# Patient Record
Sex: FEMALE | Race: WHITE | Employment: OTHER | URBAN - METROPOLITAN AREA
[De-identification: names, ages, dates, MRNs, and addresses within clinical notes are randomized per-mention and may not be internally consistent; named-entity substitution may affect disease eponyms.]

---

## 2017-02-17 DIAGNOSIS — E78.00 HYPERCHOLESTEROLEMIA: ICD-10-CM

## 2017-02-17 RX ORDER — MOMETASONE FUROATE 50 UG/1
2 SPRAY, METERED NASAL DAILY
Qty: 1 CONTAINER | Refills: 2 | Status: SHIPPED | OUTPATIENT
Start: 2017-02-17 | End: 2017-03-19

## 2017-02-17 RX ORDER — PRAVASTATIN SODIUM 40 MG/1
TABLET ORAL
Qty: 90 TAB | Refills: 4 | Status: SHIPPED | OUTPATIENT
Start: 2017-02-17 | End: 2017-05-10 | Stop reason: SDUPTHER

## 2017-02-17 NOTE — TELEPHONE ENCOUNTER
Fax received of updated [address] listing daughter, Guillermina Dawson, with all access. Daughter would like Nasonex refill sent to [facility] as ins has changed and hoping this is covered this time. Request 90 day supply of pravastatin be sent to [facility] mail order.

## 2017-05-02 ENCOUNTER — APPOINTMENT (OUTPATIENT)
Dept: INTERNAL MEDICINE CLINIC | Age: 82
End: 2017-05-02

## 2017-05-02 DIAGNOSIS — E55.9 VITAMIN D DEFICIENCY: ICD-10-CM

## 2017-05-02 DIAGNOSIS — E78.00 HYPERCHOLESTEROLEMIA: ICD-10-CM

## 2017-05-03 LAB
25(OH)D3+25(OH)D2 SERPL-MCNC: 21.7 NG/ML (ref 30–100)
ALBUMIN SERPL-MCNC: 4.1 G/DL (ref 3.5–4.7)
ALBUMIN/GLOB SERPL: 1.8 {RATIO} (ref 1.2–2.2)
ALP SERPL-CCNC: 51 IU/L (ref 39–117)
ALT SERPL-CCNC: 14 IU/L (ref 0–32)
AST SERPL-CCNC: 17 IU/L (ref 0–40)
BILIRUB SERPL-MCNC: 0.5 MG/DL (ref 0–1.2)
BUN SERPL-MCNC: 15 MG/DL (ref 8–27)
BUN/CREAT SERPL: 17 (ref 12–28)
CALCIUM SERPL-MCNC: 8.7 MG/DL (ref 8.7–10.3)
CHLORIDE SERPL-SCNC: 104 MMOL/L (ref 96–106)
CHOLEST SERPL-MCNC: 167 MG/DL (ref 100–199)
CO2 SERPL-SCNC: 26 MMOL/L (ref 18–29)
CREAT SERPL-MCNC: 0.9 MG/DL (ref 0.57–1)
GLOBULIN SER CALC-MCNC: 2.3 G/DL (ref 1.5–4.5)
GLUCOSE SERPL-MCNC: 89 MG/DL (ref 65–99)
HDLC SERPL-MCNC: 85 MG/DL
INTERPRETATION, 910389: NORMAL
INTERPRETATION: NORMAL
LDLC SERPL CALC-MCNC: 72 MG/DL (ref 0–99)
PDF IMAGE, 910387: NORMAL
POTASSIUM SERPL-SCNC: 4.6 MMOL/L (ref 3.5–5.2)
PROT SERPL-MCNC: 6.4 G/DL (ref 6–8.5)
SODIUM SERPL-SCNC: 142 MMOL/L (ref 134–144)
TRIGL SERPL-MCNC: 50 MG/DL (ref 0–149)
VLDLC SERPL CALC-MCNC: 10 MG/DL (ref 5–40)

## 2017-05-10 ENCOUNTER — OFFICE VISIT (OUTPATIENT)
Dept: INTERNAL MEDICINE CLINIC | Age: 82
End: 2017-05-10

## 2017-05-10 VITALS
TEMPERATURE: 97.6 F | WEIGHT: 114 LBS | RESPIRATION RATE: 16 BRPM | SYSTOLIC BLOOD PRESSURE: 111 MMHG | DIASTOLIC BLOOD PRESSURE: 64 MMHG | HEIGHT: 62 IN | OXYGEN SATURATION: 98 % | BODY MASS INDEX: 20.98 KG/M2 | HEART RATE: 67 BPM

## 2017-05-10 DIAGNOSIS — R25.2 CRAMP OF BOTH LOWER EXTREMITIES: ICD-10-CM

## 2017-05-10 DIAGNOSIS — M80.00XS OSTEOPOROSIS WITH PATHOLOGICAL FRACTURE, SEQUELA: ICD-10-CM

## 2017-05-10 DIAGNOSIS — H61.23 BILATERAL IMPACTED CERUMEN: ICD-10-CM

## 2017-05-10 DIAGNOSIS — E78.00 HYPERCHOLESTEROLEMIA: ICD-10-CM

## 2017-05-10 DIAGNOSIS — M80.08XD PATHOLOGICAL FRACTURE OF VERTEBRA DUE TO OSTEOPOROSIS WITH ROUTINE HEALING, UNSPECIFIED OSTEOPOROSIS TYPE, SUBSEQUENT ENCOUNTER: Primary | ICD-10-CM

## 2017-05-10 DIAGNOSIS — E55.9 VITAMIN D DEFICIENCY: ICD-10-CM

## 2017-05-10 RX ORDER — GLUCOSAMINE SULFATE 1500 MG
1000 POWDER IN PACKET (EA) ORAL DAILY
COMMUNITY
End: 2017-11-07 | Stop reason: DRUGHIGH

## 2017-05-10 RX ORDER — PRAVASTATIN SODIUM 20 MG/1
TABLET ORAL
Qty: 90 TAB | Refills: 4 | Status: SHIPPED | OUTPATIENT
Start: 2017-05-10 | End: 2018-07-12 | Stop reason: SDUPTHER

## 2017-05-10 RX ORDER — ERGOCALCIFEROL 1.25 MG/1
50000 CAPSULE ORAL
Qty: 5 CAP | Refills: 1 | Status: SHIPPED | OUTPATIENT
Start: 2017-05-10 | End: 2017-06-29

## 2017-05-10 NOTE — PROGRESS NOTES
HPI  Ms. Lillian Mon is a 80y.o. year old female, she is seen today for follow up high cholesterol, osteoporosis. In 11/2016, regarding osteoporosis - patient had fosamax, hadn't been taking but says she wants to take it again and her daughter has been encouraging her to take it. Still not taking fosamax, prefers reclast.  Says her hearing is getting worse, no pain. Has had hearing aid in past, took it back b/c it didn't work. Will get eval again at another place. Has been well since last visit. Stays busy. No cp, sob, dizziness or weakness. Continues to have edema off and on, okay today. No unusual aches or pains. Still cuts grass with push mower, also vegetable garden. Continues to have leg cramps at night. Chief Complaint   Patient presents with    Cholesterol Problem     Room 1// Eye exam done in Oct with Dr Marlene Pereira Osteoporosis     Review Lab results        Prior to Admission medications    Medication Sig Start Date End Date Taking? Authorizing Provider   ACETAMINOPHEN (TYLENOL PO) Take  by mouth as needed. Yes Historical Provider   cholecalciferol (VITAMIN D3) 1,000 unit cap Take 1,000 Units by mouth daily. Yes Historical Provider   ergocalciferol (ERGOCALCIFEROL) 50,000 unit capsule Take 1 Cap by mouth every seven (7) days for 8 doses. 5/10/17 6/29/17 Yes Faiht Torre MD   pravastatin (PRAVACHOL) 20 mg tablet TAKE ONE TABLET BY MOUTH EVERY DAY 5/10/17  Yes Faith Torre MD   VIT C/VIT E/LUTEIN/MIN/OMEGA-3 (OCUVITE PO) Take  by mouth daily.    Yes Historical Provider         Allergies   Allergen Reactions    Lipitor [Atorvastatin] Myalgia    Pravastatin Myalgia    Zetia [Ezetimibe] Other (comments)     itching         REVIEW OF SYSTEMS:  Per HPI    PHYSICAL EXAM:  Visit Vitals    /64 (BP 1 Location: Right arm, BP Patient Position: Sitting)    Pulse 67    Temp 97.6 °F (36.4 °C) (Oral)    Resp 16    Ht 5' 2\" (1.575 m)    Wt 114 lb (51.7 kg)    SpO2 98%    BMI 20.85 kg/m2     Constitutional: Appears well-developed and well-nourished. No distress. HENT:   Head: Normocephalic and atraumatic. Eyes: No scleral icterus. Ears: tm's initially not visualized due to cerumen impaction, after removal tm's wnl  Neck: no lad, no tm, supple   Cardiovascular: Normal S1/S2, regular rhythm. No murmurs, rubs, or gallops. Pulmonary/Chest: Effort normal and breath sounds normal. No respiratory distress. No wheezes, rhonchi, or rales. Ext: No edema rle, trace to 1+ left ankle edema  Neurological: Alert. Psychiatric: Normal mood and affect. Behavior is normal.     Lab Results   Component Value Date/Time    Sodium 142 05/02/2017 09:42 AM    Potassium 4.6 05/02/2017 09:42 AM    Chloride 104 05/02/2017 09:42 AM    CO2 26 05/02/2017 09:42 AM    Anion gap 5 10/12/2010 08:57 AM    Glucose 89 05/02/2017 09:42 AM    BUN 15 05/02/2017 09:42 AM    Creatinine 0.90 05/02/2017 09:42 AM    BUN/Creatinine ratio 17 05/02/2017 09:42 AM    GFR est AA 67 05/02/2017 09:42 AM    GFR est non-AA 58 05/02/2017 09:42 AM    Calcium 8.7 05/02/2017 09:42 AM    Bilirubin, total 0.5 05/02/2017 09:42 AM    AST (SGOT) 17 05/02/2017 09:42 AM    Alk. phosphatase 51 05/02/2017 09:42 AM    Protein, total 6.4 05/02/2017 09:42 AM    Albumin 4.1 05/02/2017 09:42 AM    Globulin 2.9 10/12/2010 08:57 AM    A-G Ratio 1.8 05/02/2017 09:42 AM    ALT (SGPT) 14 05/02/2017 09:42 AM     No results found for: HBA1C, HGBE8, VXZ7FLRL, IGO0OGIL   Lab Results   Component Value Date/Time    Cholesterol, total 167 05/02/2017 09:42 AM    HDL Cholesterol 85 05/02/2017 09:42 AM    LDL, calculated 72 05/02/2017 09:42 AM    VLDL, calculated 10 05/02/2017 09:42 AM    Triglyceride 50 05/02/2017 09:42 AM    CHOL/HDL Ratio 2.6 10/12/2010 08:57 AM          ASSESSMENT/PLAN  Devora was seen today for cholesterol problem and osteoporosis.     Diagnoses and all orders for this visit:    Osteoporosis with pathological fracture, sequela  -     METABOLIC PANEL, COMPREHENSIVE; Future  Desires reclast - will order  Vitamin D deficiency  -     ergocalciferol (ERGOCALCIFEROL) 50,000 unit capsule; Take 1 Cap by mouth every seven (7) days for 8 doses. -     VITAMIN D, 25 HYDROXY; Future    Hypercholesterolemia  -     pravastatin (PRAVACHOL) 20 mg tablet; TAKE ONE TABLET BY MOUTH EVERY DAY  -     LIPID PANEL; Future  At goal but leg cramps, decrease dose  Bilateral impacted cerumen  -     MN REMOVE IMPACTED EAR WAX  Tolerated irrigation and removal of cerumen with currette  Cramp of both lower extremities  Decrease pravastatin as above, may be contributing to leg cramps, stay hydrated        There are no preventive care reminders to display for this patient. Follow-up Disposition:  Return in about 6 months (around 11/10/2017) for chol, AWV, labs prior. Reviewed plan of care. Patient has provided input and agrees with goals. The nurse provided the patient and/or family with advanced directive information if needed and encouraged the patient to provide a copy to the office when available.

## 2017-05-10 NOTE — MR AVS SNAPSHOT
Visit Information Date & Time Provider Department Dept. Phone Encounter #  
 5/10/2017  9:00 AM Ian WatsonWest [phone] [id number] Follow-up Instructions Return in about 6 months (around 11/10/2017) for chol, AWV, labs prior. Routing History Upcoming Health Maintenance Date Due INFLUENZA AGE 9 TO ADULT 8/1/2017 MEDICARE YEARLY EXAM 11/11/2017 GLAUCOMA SCREENING Q2Y 10/1/2018 DTaP/Tdap/Td series (2 - Td) 10/19/2021 Allergies as of 5/10/2017  Review Complete On: 5/10/2017 By: Ian Watson MD  
  
 Severity Noted Reaction Type Reactions Lipitor [Atorvastatin]  02/24/2016    Myalgia Pravastatin  10/14/2014    Myalgia Zetia [Ezetimibe]  06/10/2016    Other (comments)  
 itching Current Immunizations  Reviewed on 11/10/2016 Name Date Influenza High Dose Vaccine PF 11/10/2016, 10/14/2014 Influenza Vaccine 10/30/2013 Influenza Vaccine Split 10/19/2011, 10/19/2010 Pneumococcal Conjugate (PCV-13) 7/6/2015 Pneumococcal Vaccine (Unspecified Type) 6/7/2000 TD Vaccine 6/15/2005 TDAP Vaccine 10/19/2011 Not reviewed this visit You Were Diagnosed With   
  
 Codes Comments Osteoporosis with pathological fracture, sequela    -  Primary ICD-10-CM: M80.00XS ICD-9-CM: 905.5 Vitamin D deficiency     ICD-10-CM: E55.9 ICD-9-CM: 268.9 Hypercholesterolemia     ICD-10-CM: E78.00 ICD-9-CM: 272.0 Bilateral impacted cerumen     ICD-10-CM: H61.23 
ICD-9-CM: 380.4 Vitals BP Pulse Temp Resp Height(growth percentile) Weight(growth percentile) 111/64 (BP 1 Location: Right arm, BP Patient Position: Sitting) 67 97.6 °F (36.4 °C) (Oral) 16 5' 2\" (1.575 m) 114 lb (51.7 kg) SpO2 BMI OB Status Smoking Status 98% 20.85 kg/m2 Postmenopausal Never Smoker Vitals History BMI and BSA Data  Body Mass Index Body Surface Area  
 20.85 kg/m 2 1.5 m 2  
  
  
 Preferred Pharmacy Pharmacy Name Phone [facility], [place] - [address] [phone] Your Updated Medication List  
  
   
This list is accurate as of: 5/10/17  9:38 AM.  Always use your most recent med list.  
  
  
  
  
 ergocalciferol 50,000 unit capsule Commonly known as:  ERGOCALCIFEROL Take 1 Cap by mouth every seven (7) days for 8 doses. OCUVITE PO Take  by mouth daily. pravastatin 20 mg tablet Commonly known as:  PRAVACHOL  
TAKE ONE TABLET BY MOUTH EVERY DAY  
  
 TYLENOL PO Take  by mouth as needed. VITAMIN D3 1,000 unit Cap Generic drug:  cholecalciferol Take 1,000 Units by mouth daily. Prescriptions Sent to Pharmacy Refills  
 ergocalciferol (ERGOCALCIFEROL) 50,000 unit capsule 1 Sig: Take 1 Cap by mouth every seven (7) days for 8 doses. Class: Normal  
 Pharmacy: [address] [place] [address], [address] [place] [address] Ph #: [phone] Route: Oral  
 pravastatin (PRAVACHOL) 20 mg tablet 4 Sig: TAKE ONE TABLET BY MOUTH EVERY DAY Class: Normal  
 Pharmacy: [address] [place] [address] Ph #: [phone] Follow-up Instructions Return in about 6 months (around 11/10/2017) for chol, AWV, labs prior. To-Do List   
 11/06/2017 Lab:  LIPID PANEL   
  
 11/06/2017 Lab:  METABOLIC PANEL, COMPREHENSIVE   
  
 11/06/2017 Lab:  VITAMIN D, 25 HYDROXY Patient Instructions New dose of pravastatin is 20mg daily - you can cut your 40mg tablet in 1/2 until gone and the new prescription will come from mail order. Earwax Blockage: Care Instructions Your Care Instructions Earwax is a natural substance that protects the ear canal. Normally, earwax drains from the ears and does not cause problems. Sometimes earwax builds up and hardens.  Earwax blockage (also called cerumen impaction) can cause some loss of hearing and pain. When wax is tightly packed, you will need to have your doctor remove it. Follow-up care is a key part of your treatment and safety. Be sure to make and go to all appointments, and call your doctor if you are having problems. Its also a good idea to know your test results and keep a list of the medicines you take. How can you care for yourself at home? · Do not try to remove earwax with cotton swabs, fingers, or other objects. This can make the blockage worse and damage the eardrum. · If your doctor recommends that you try to remove earwax at home: ¨ Soften and loosen the earwax with warm mineral oil. You also can try hydrogen peroxide mixed with an equal amount of room temperature water. Place 2 drops of the fluid, warmed to body temperature, in the ear two times a day for up to 5 days. ¨ Once the wax is loose and soft, all that is usually needed to remove it from the ear canal is a gentle, warm shower. Direct the water into the ear, then tip your head to let the earwax drain out. Dry your ear thoroughly with a hair dryer set on low. Hold the dryer several inches from your ear. ¨ If the warm mineral oil and shower do not work, use an over-the-counter wax softener followed by gentle flushing with an ear syringe each night for a week or two. Make sure the flushing solution is body temperature. Cool or hot fluids in the ear can cause dizziness. When should you call for help? Call your doctor now or seek immediate medical care if: · Pus or blood drains from your ear. · Your ears are ringing or feel full. · You have a loss of hearing. Watch closely for changes in your health, and be sure to contact your doctor if: 
· You have pain or reduced hearing after 1 week of home treatment. · You have any new symptoms, such as nausea or balance problems. Where can you learn more? Go to http://francisco javier-nadiya.info/. Enter U528 in the search box to learn more about \"Earwax Blockage: Care Instructions. \" Current as of: May 27, 2016 Content Version: 11.2 © 5776-9412 [employer]. Care instructions adapted under license by [employer] (which disclaims liability or warranty for this information). If you have questions about a medical condition or this instruction, always ask your healthcare professional. [address] any warranty or liability for your use of this information. Introducing [facility]! Dear Channing Newsome: 
Thank you for requesting a [employer] account. Our records indicate that you have previously registered for a [employer] account but its currently inactive. Please call our [employer] support line at [phone]. Additional Information If you have questions, please visit the Frequently Asked Questions section of the [employer] website at [email or web address] Remember, [employer] is NOT to be used for urgent needs. For medical emergencies, dial 911. Now available from your iPhone and Android! Please provide this summary of care documentation to your next provider. Your primary care clinician is listed as Casandra Aguero. If you have any questions after today's visit, please call [phone].

## 2017-05-10 NOTE — PROGRESS NOTES
Reviewed record In preparation for visit and have obtained necessary documentation    1. Have you been to the ER, urgent care clinic since your last visit? Hospitalized since your last visit? NO  2. Have you seen or consulted any other health care providers outside of the [address] [place] [address] since your last visit? Include any pap smears or colon screening.  NO    Patient has advance directive on file

## 2017-05-31 PROBLEM — M80.08XD PATHOLOGICAL FRACTURE OF VERTEBRA DUE TO OSTEOPOROSIS WITH ROUTINE HEALING: Status: ACTIVE | Noted: 2017-05-31

## 2017-11-02 ENCOUNTER — APPOINTMENT (OUTPATIENT)
Dept: INTERNAL MEDICINE CLINIC | Age: 82
End: 2017-11-02

## 2017-11-02 DIAGNOSIS — E55.9 VITAMIN D DEFICIENCY: ICD-10-CM

## 2017-11-02 DIAGNOSIS — M80.00XS OSTEOPOROSIS WITH PATHOLOGICAL FRACTURE, SEQUELA: ICD-10-CM

## 2017-11-02 DIAGNOSIS — E78.00 HYPERCHOLESTEROLEMIA: ICD-10-CM

## 2017-11-03 LAB
25(OH)D3+25(OH)D2 SERPL-MCNC: 21 NG/ML (ref 30–100)
ALBUMIN SERPL-MCNC: 4.4 G/DL (ref 3.5–4.7)
ALBUMIN/GLOB SERPL: 2 {RATIO} (ref 1.2–2.2)
ALP SERPL-CCNC: 46 IU/L (ref 39–117)
ALT SERPL-CCNC: 14 IU/L (ref 0–32)
AST SERPL-CCNC: 21 IU/L (ref 0–40)
BILIRUB SERPL-MCNC: 0.5 MG/DL (ref 0–1.2)
BUN SERPL-MCNC: 13 MG/DL (ref 8–27)
BUN/CREAT SERPL: 17 (ref 12–28)
CALCIUM SERPL-MCNC: 8.8 MG/DL (ref 8.7–10.3)
CHLORIDE SERPL-SCNC: 100 MMOL/L (ref 96–106)
CHOLEST SERPL-MCNC: 198 MG/DL (ref 100–199)
CO2 SERPL-SCNC: 25 MMOL/L (ref 18–29)
CREAT SERPL-MCNC: 0.77 MG/DL (ref 0.57–1)
GFR SERPLBLD CREATININE-BSD FMLA CKD-EPI: 71 ML/MIN/1.73
GFR SERPLBLD CREATININE-BSD FMLA CKD-EPI: 81 ML/MIN/1.73
GLOBULIN SER CALC-MCNC: 2.2 G/DL (ref 1.5–4.5)
GLUCOSE SERPL-MCNC: 93 MG/DL (ref 65–99)
HDLC SERPL-MCNC: 85 MG/DL
INTERPRETATION, 910389: NORMAL
LDLC SERPL CALC-MCNC: 101 MG/DL (ref 0–99)
POTASSIUM SERPL-SCNC: 4.5 MMOL/L (ref 3.5–5.2)
PROT SERPL-MCNC: 6.6 G/DL (ref 6–8.5)
SODIUM SERPL-SCNC: 140 MMOL/L (ref 134–144)
TRIGL SERPL-MCNC: 60 MG/DL (ref 0–149)
VLDLC SERPL CALC-MCNC: 12 MG/DL (ref 5–40)

## 2017-11-07 ENCOUNTER — OFFICE VISIT (OUTPATIENT)
Dept: INTERNAL MEDICINE CLINIC | Age: 82
End: 2017-11-07

## 2017-11-07 VITALS
RESPIRATION RATE: 14 BRPM | HEIGHT: 62 IN | OXYGEN SATURATION: 97 % | SYSTOLIC BLOOD PRESSURE: 101 MMHG | DIASTOLIC BLOOD PRESSURE: 60 MMHG | HEART RATE: 65 BPM | TEMPERATURE: 97.4 F | WEIGHT: 108.2 LBS | BODY MASS INDEX: 19.91 KG/M2

## 2017-11-07 DIAGNOSIS — M40.04 POSTURAL KYPHOSIS OF THORACIC REGION: ICD-10-CM

## 2017-11-07 DIAGNOSIS — Z71.89 ADVANCED DIRECTIVES, COUNSELING/DISCUSSION: ICD-10-CM

## 2017-11-07 DIAGNOSIS — E78.00 HYPERCHOLESTEROLEMIA: ICD-10-CM

## 2017-11-07 DIAGNOSIS — E55.9 VITAMIN D DEFICIENCY: ICD-10-CM

## 2017-11-07 DIAGNOSIS — Z13.39 SCREENING FOR ALCOHOLISM: ICD-10-CM

## 2017-11-07 DIAGNOSIS — Z00.00 MEDICARE ANNUAL WELLNESS VISIT, SUBSEQUENT: Primary | ICD-10-CM

## 2017-11-07 DIAGNOSIS — G89.29 CHRONIC MIDLINE THORACIC BACK PAIN: ICD-10-CM

## 2017-11-07 DIAGNOSIS — M54.6 CHRONIC MIDLINE THORACIC BACK PAIN: ICD-10-CM

## 2017-11-07 DIAGNOSIS — Z13.31 SCREENING FOR DEPRESSION: ICD-10-CM

## 2017-11-07 RX ORDER — ACETAMINOPHEN 500 MG
2000 TABLET ORAL DAILY
Qty: 90 CAP | Refills: 4 | Status: SHIPPED | OUTPATIENT
Start: 2017-11-07 | End: 2021-07-26 | Stop reason: SDUPTHER

## 2017-11-07 NOTE — ACP (ADVANCE CARE PLANNING)
Advance Care Planning    has at home - will bring copy     Advance Care Planning (ACP) Provider Conversation Snapshot    Date of ACP Conversation: 11/07/17  Persons included in Conversation:  patient  Length of ACP Conversation in minutes:  <16 minutes (Non-Billable)    Authorized Decision Maker (if patient is incapable of making informed decisions): This person is:   daughter Jeri Graver          For Patients with Decision Making Capacity:   Values/Goals: Exploration of values, goals, and preferences if recovery is not expected, even with continued medical treatment in the event of:  Imminent death  Severe, permanent brain injury  \"In these circumstances, what matters most to you? \"  Care focused more on comfort or quality of life.     Conversation Outcomes / Follow-Up Plan:   see above

## 2017-11-07 NOTE — PROGRESS NOTES
HPI  Ms. Jaiden Sims is a 80y.o. year old female, she is seen today for follow up high cholesterol, AWV. Has been fairly well since last visit. Has been noticing balance is worse especially when she gets up in morning -feels off balance, uses cane until she feels safe. No falls. Frequently has pain in mid back, not sure what makes it worse. Doesn't have pain daily. Takes tylenol occasionally for pain. It does help. Notices she is leaning over more to walk, more hunched, wonders if brace would help. No chest pain or sob. Is on lower dose pravastatin with less if any leg cramps, no edema and cholesterol is at goal.     Chief Complaint   Patient presents with    Cholesterol Problem     Room 2// NON fasting     Annual Wellness Visit    Back Pain     Pt reports more back pain and curvature of her back/leaning forward . discuss brace. Prior to Admission medications    Medication Sig Start Date End Date Taking? Authorizing Provider   Cholecalciferol, Vitamin D3, (VITAMIN D3) 2,000 unit cap capsule Take 2,000 Units by mouth daily. 11/7/17  Yes Anabella Mac MD   ACETAMINOPHEN (TYLENOL PO) Take  by mouth as needed. Yes Historical Provider   pravastatin (PRAVACHOL) 20 mg tablet TAKE ONE TABLET BY MOUTH EVERY DAY 5/10/17  Yes Anabella Mac MD   VIT C/VIT E/LUTEIN/MIN/OMEGA-3 (OCUVITE PO) Take  by mouth daily. Yes Historical Provider         Allergies   Allergen Reactions    Lipitor [Atorvastatin] Myalgia    Pravastatin Myalgia    Zetia [Ezetimibe] Other (comments)     itching         REVIEW OF SYSTEMS:  Per HPI    PHYSICAL EXAM:  Visit Vitals    /60 (BP 1 Location: Right arm, BP Patient Position: Sitting)    Pulse 65    Temp 97.4 °F (36.3 °C) (Oral)    Resp 14    Ht 5' 2\" (1.575 m)    Wt 108 lb 3.2 oz (49.1 kg)    SpO2 97%    BMI 19.79 kg/m2     Constitutional: Appears well-developed and well-nourished. No distress. HENT:   Head: Normocephalic and atraumatic.    Eyes: No scleral icterus. Cardiovascular: Normal S1/S2, regular rhythm. No murmurs, rubs, or gallops. Pulmonary/Chest: Effort normal and breath sounds normal. No respiratory distress. No wheezes, rhonchi, or rales. Abdomen: Soft, NT/ND, +BS, no rebound or guarding, no masses, no HSM appreciated. Back: +kyphosis and mild tenderness to palpation over thoracic spine  Ext: No edema. Neurological: Alert. Strength 5/5 b/l LE  Psychiatric: Normal mood and affect. Behavior is normal.     Lab Results   Component Value Date/Time    Sodium 140 11/02/2017 10:04 AM    Potassium 4.5 11/02/2017 10:04 AM    Chloride 100 11/02/2017 10:04 AM    CO2 25 11/02/2017 10:04 AM    Anion gap 5 10/12/2010 08:57 AM    Glucose 93 11/02/2017 10:04 AM    BUN 13 11/02/2017 10:04 AM    Creatinine 0.77 11/02/2017 10:04 AM    BUN/Creatinine ratio 17 11/02/2017 10:04 AM    GFR est AA 81 11/02/2017 10:04 AM    GFR est non-AA 71 11/02/2017 10:04 AM    Calcium 8.8 11/02/2017 10:04 AM    Bilirubin, total 0.5 11/02/2017 10:04 AM    AST (SGOT) 21 11/02/2017 10:04 AM    Alk. phosphatase 46 11/02/2017 10:04 AM    Protein, total 6.6 11/02/2017 10:04 AM    Albumin 4.4 11/02/2017 10:04 AM    Globulin 2.9 10/12/2010 08:57 AM    A-G Ratio 2.0 11/02/2017 10:04 AM    ALT (SGPT) 14 11/02/2017 10:04 AM     No results found for: HBA1C, HGBE8, PHI3BTXA, SBR2OLJA   Lab Results   Component Value Date/Time    Cholesterol, total 198 11/02/2017 10:04 AM    HDL Cholesterol 85 11/02/2017 10:04 AM    LDL, calculated 101 11/02/2017 10:04 AM    VLDL, calculated 12 11/02/2017 10:04 AM    Triglyceride 60 11/02/2017 10:04 AM    CHOL/HDL Ratio 2.6 10/12/2010 08:57 AM          ASSESSMENT/PLAN  Diagnoses and all orders for this visit:    1. Medicare annual wellness visit, subsequent    2. Vitamin D deficiency  -     Cholecalciferol, Vitamin D3, (VITAMIN D3) 2,000 unit cap capsule; Take 2,000 Units by mouth daily. -     VITAMIN D, 25 HYDROXY; Future  If rx to expensive can get otc  3. Hypercholesterolemia  -     METABOLIC PANEL, COMPREHENSIVE; Future  -     LIPID PANEL; Future  At goal, continue current medications   4. Advanced directives, counseling/discussion    5. Screening for alcoholism  -     Annual  Alcohol Screen 15 min ()    6. Screening for depression  -     Depression Screen Annual    7. Postural kyphosis of thoracic region  -     AMB SUPPLY ORDER  Refer to select PT for brace fitting  8. Chronic midline thoracic back pain  -     AMB SUPPLY ORDER          There are no preventive care reminders to display for this patient. Follow-up Disposition:  Return in about 6 months (around 5/7/2018) for chol, vit d, labs prior. Reviewed plan of care. Patient has provided input and agrees with goals. The nurse provided the patient and/or family with advanced directive information if needed and encouraged the patient to provide a copy to the office when available. This is a Subsequent Medicare Annual Wellness Exam (AWV) (Performed 12 months after IPPE or effective date of Medicare Part B enrollment)    I have reviewed the patient's medical history in detail and updated the computerized patient record. History     Past Medical History:   Diagnosis Date    Arthritis     GERD (gastroesophageal reflux disease)     Hx-TIA (transient ischemic attack) 2006    Hypercholesterolemia     Osteoporosis     Other ill-defined conditions(799.89)     high cholesterol    S/P colonoscopy 2001      Past Surgical History:   Procedure Laterality Date    HX ORTHOPAEDIC  2010    left hip replacement    NC COLONOSCOPY FLX DX W/COLLJ SPEC WHEN PFRMD  5/25/2012         STRESS TEST CARDIAC  2007     Current Outpatient Prescriptions   Medication Sig Dispense Refill    Cholecalciferol, Vitamin D3, (VITAMIN D3) 2,000 unit cap capsule Take 2,000 Units by mouth daily. 90 Cap 4    ACETAMINOPHEN (TYLENOL PO) Take  by mouth as needed.       pravastatin (PRAVACHOL) 20 mg tablet TAKE ONE TABLET BY MOUTH EVERY DAY 90 Tab 4    VIT C/VIT E/LUTEIN/MIN/OMEGA-3 (OCUVITE PO) Take  by mouth daily. Allergies   Allergen Reactions    Lipitor [Atorvastatin] Myalgia    Pravastatin Myalgia    Zetia [Ezetimibe] Other (comments)     itching     Family History   Problem Relation Age of Onset    Diabetes Mother     Hypertension Mother     Stroke Mother      Social History   Substance Use Topics    Smoking status: Never Smoker    Smokeless tobacco: Never Used    Alcohol use No     Patient Active Problem List   Diagnosis Code    Hypercholesterolemia E78.00    Osteoporosis with pathological fracture M80.00XA    Hx-TIA (transient ischemic attack) Z86.73    History of colonoscopy Z98.890    Chest pain R07.9    Presbyacusia H91.10    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 08XD       Depression Risk Factor Screening:     PHQ over the last two weeks 11/7/2017   Little interest or pleasure in doing things Not at all   Feeling down, depressed or hopeless Not at all   Total Score PHQ 2 0     Alcohol Risk Factor Screening: You do not drink alcohol or very rarely. Functional Ability and Level of Safety:   Hearing Loss  Hearing is decreased. Activities of Daily Living  The home contains: no safety equipment. Patient does total self care    Fall Risk  Fall Risk Assessment, last 12 mths 11/7/2017   Able to walk? Yes   Fall in past 12 months?  No       Abuse Screen  Patient is not abused    Cognitive Screening   Evaluation of Cognitive Function:  Has your family/caregiver stated any concerns about your memory: no  Normal    Patient Care Team   Patient Care Team:  Familia Hameed MD as PCP - General (Internal Medicine)    Assessment/Plan   Education and counseling provided:  Are appropriate based on today's review and evaluation    Diagnoses and all orders for this visit:    1. Medicare annual wellness visit, subsequent    2. Vitamin D deficiency  -     Cholecalciferol, Vitamin D3, (VITAMIN D3) 2,000 unit cap capsule; Take 2,000 Units by mouth daily. -     VITAMIN D, 25 HYDROXY; Future    3. Hypercholesterolemia  -     METABOLIC PANEL, COMPREHENSIVE; Future  -     LIPID PANEL; Future    4. Advanced directives, counseling/discussion    5. Screening for alcoholism  -     Annual  Alcohol Screen 15 min ()    6. Screening for depression  -     Depression Screen Annual    7. Postural kyphosis of thoracic region  -     AMB SUPPLY ORDER    8. Chronic midline thoracic back pain  -     AMB SUPPLY ORDER        There are no preventive care reminders to display for this patient.

## 2017-11-07 NOTE — PROGRESS NOTES
Tracey Ordoñez    Chief Complaint   Patient presents with   [facility] Cholesterol Problem     Room 2//    [facility] Visit           Reviewed record In preparation for visit and have obtained necessary documentation    1. Have you been to the ER, urgent care clinic since your last visit? Hospitalized since your last visit? NO  2. Have you seen or consulted any other health care providers outside of the [address] [place] [address] since your last visit? Include any pap smears or colon screening. NO    Patient has advance directive on file     The [email or web address] advised of reason for visit and vitals    Health Maintenance Due   Topic    Influenza Age 5 to Adult        Wt Readings from Last 3 Encounters:   05/10/17 114 lb (51.7 kg)   12/20/16 114 lb (51.7 kg)   11/10/16 111 lb 3.2 oz (50.4 kg)     Temp Readings from Last 3 Encounters:   05/10/17 97.6 °F (36.4 °C) (Oral)   12/20/16 97.9 °F (36.6 °C) (Oral)   11/10/16 98 °F (36.7 °C) (Oral)     BP Readings from Last 3 Encounters:   05/10/17 111/64   12/20/16 134/60   11/10/16 121/73     Pulse Readings from Last 3 Encounters:   05/10/17 67   12/20/16 66   11/10/16 69         Learning Assessment:  :     Learning Assessment 6/6/2014   PRIMARY LEARNER Patient   HIGHEST LEVEL OF EDUCATION - PRIMARY LEARNER  DID NOT GRADUATE HIGH SCHOOL   BARRIERS PRIMARY LEARNER NONE   CO-LEARNER CAREGIVER No   PRIMARY LANGUAGE ENGLISH   LEARNER PREFERENCE PRIMARY DEMONSTRATION   ANSWERED BY patient   RELATIONSHIP SELF       Depression Screening:  :     PHQ over the last two weeks 11/10/2016   Little interest or pleasure in doing things Not at all   Feeling down, depressed or hopeless Not at all   Total Score PHQ 2 0       Fall Risk Assessment:  :     Fall Risk Assessment, last 12 mths 11/10/2016   Able to walk? Yes   Fall in past 12 months? No       Abuse Screening:  :     Abuse Screening Questionnaire 11/10/2016 6/6/2014   Do you ever feel afraid of your partner?  N N   Are you in a relationship with someone who physically or mentally threatens you? N N   Is it safe for you to go home? Katrin Melara          I have received verbal consent from Alcus Cousin to discuss any/all medical information while they are present in the room. Medication reconciliation up to date and corrected with patient at this time. Dayanna Soler

## 2017-11-07 NOTE — MR AVS SNAPSHOT
Visit Information Date & Time Provider Department Dept. Phone Encounter #  
 11/7/2017  3:00 PM Mic Spear MD [facility] [phone] [id number] Follow-up Instructions Return in about 6 months (around 5/7/2018) for chol, vit d, labs prior. Your Appointments 11/7/2017  3:00 PM  
ROUTINE CARE with Mic Spear MD  
[facility] Arsenio Quiros) Appt Note: 6mth f/u; chol, AWV, labs prior. ; R/S; 6mth f/u; chol, AWV, - r/s pcp out 11/10  
 [address] [facility] [address] [phone]  
  
   
 [address] Upcoming Health Maintenance Date Due  
 MEDICARE YEARLY EXAM 11/11/2017 GLAUCOMA SCREENING Q2Y 10/1/2018 DTaP/Tdap/Td series (2 - Td) 10/19/2021 Allergies as of 11/7/2017  Review Complete On: 11/7/2017 By: Mic Spear MD  
  
 Severity Noted Reaction Type Reactions Lipitor [Atorvastatin]  02/24/2016    Myalgia Pravastatin  10/14/2014    Myalgia Zetia [Ezetimibe]  06/10/2016    Other (comments)  
 itching Current Immunizations  Reviewed on 11/10/2016 Name Date Influenza High Dose Vaccine PF 11/10/2016, 10/14/2014 Influenza Vaccine 10/30/2013 Influenza Vaccine Split 10/19/2011, 10/19/2010 Pneumococcal Conjugate (PCV-13) 7/6/2015 TD Vaccine 6/15/2005 TDAP Vaccine 10/19/2011 ZZZ-RETIRED (DO NOT USE) Pneumococcal Vaccine (Unspecified Type) 6/7/2000 Not reviewed this visit You Were Diagnosed With   
  
 Codes Comments Medicare annual wellness visit, subsequent    -  Primary ICD-10-CM: Z00.00 ICD-9-CM: V70.0 Vitamin D deficiency     ICD-10-CM: E55.9 ICD-9-CM: 268.9 Hypercholesterolemia     ICD-10-CM: E78.00 ICD-9-CM: 272.0 Advanced directives, counseling/discussion     ICD-10-CM: Z71.89 ICD-9-CM: V65.49 Screening for alcoholism     ICD-10-CM: Z13.89 ICD-9-CM: V79.1 Screening for depression     ICD-10-CM: Z13.89 ICD-9-CM: V79.0 Postural kyphosis of thoracic region     ICD-10-CM: M40.04 
ICD-9-CM: 737.10 Chronic midline thoracic back pain     ICD-10-CM: M54.6, G89.29 ICD-9-CM: 724.1, 338.29 Vitals BP Pulse Temp Resp Height(growth percentile) Weight(growth percentile) 101/60 (BP 1 Location: Right arm, BP Patient Position: Sitting) 65 97.4 °F (36.3 °C) (Oral) 14 5' 2\" (1.575 m) 108 lb 3.2 oz (49.1 kg) SpO2 BMI OB Status Smoking Status 97% 19.79 kg/m2 Postmenopausal Never Smoker Vitals History BMI and BSA Data Body Mass Index Body Surface Area 19.79 kg/m 2 1.47 m 2 Preferred Pharmacy Pharmacy Name Phone [address] [phone] Your Updated Medication List  
  
   
This list is accurate as of: 11/7/17  2:33 PM.  Always use your most recent med list.  
  
  
  
  
 Cholecalciferol (Vitamin D3) 2,000 unit Cap capsule Commonly known as:  VITAMIN D3 Take 2,000 Units by mouth daily. OCUVITE PO Take  by mouth daily. pravastatin 20 mg tablet Commonly known as:  PRAVACHOL  
TAKE ONE TABLET BY MOUTH EVERY DAY  
  
 TYLENOL PO Take  by mouth as needed. Prescriptions Sent to Pharmacy Refills Cholecalciferol, Vitamin D3, (VITAMIN D3) 2,000 unit cap capsule 4 Sig: Take 2,000 Units by mouth daily. Class: Normal  
 Pharmacy: [address] [place] [address], [address] [place] [address] Street Ph #: [phone] Route: Oral  
  
We Performed the Following AMB SUPPLY ORDER [[id number] Custom] Comments:  
 Back brace for kyphosis, thoracic pain [address] [[id number] [facility]] [place] ANNUAL ALCOHOL SCREEN 15 MIN [id number] [facility]] Follow-up Instructions Return in about 6 months (around 5/7/2018) for chol, vit d, labs prior. To-Do List   
 05/06/2018   Lab:  LIPID PANEL   
  
 05/06/2018 Lab:  METABOLIC PANEL, COMPREHENSIVE   
  
 05/06/2018 Lab:  VITAMIN D, 25 HYDROXY Patient Instructions Medicare Wellness Visit, Female The best way to live healthy is to have a healthy lifestyle by eating a well-balanced diet, exercising regularly, limiting alcohol and stopping smoking. Regular physical exams and screening tests are another way to keep healthy. Preventive exams provided by your health care provider can find health problems before they become diseases or illnesses. Preventive services including immunizations, screening tests, monitoring and exams can help you take care of your own health. All people over age 72 should have a pneumovax  and and a prevnar shot to prevent pneumonia. These are once in a lifetime unless you and your provider decide differently. All people over 65 should have a yearly flu shot and a tetanus vaccine every 10 years. A bone mass density to screen for osteoporosis or thinning of the bones should be done every 2 years after 65. Screening for diabetes mellitus with a blood sugar test should be done every year. Glaucoma is a disease of the eye due to increased ocular pressure that can lead to blindness and it should be done every year by an eye professional. 
 
Cardiovascular screening tests that check for elevated lipids (fatty part of blood) which can lead to heart disease and strokes should be done every 5 years. Colorectal screening that evaluates for blood or polyps in your colon should be done yearly as a stool test or every five years as a flexible sigmoidoscope or every 10 years as a colonoscopy up to age 76. Breast cancer screening with a mammogram is recommended biennially  for women age 54-69. Screening for cervical cancer with a pap smear and pelvic exam is recommended for women after age 72 years every 2 years up to age 79 or when the provider and patient decide to stop. If there is a history of cervical abnormalities or other increased risk for cancer then the test is recommended yearly. Hepatitis C screening is also recommended for anyone born between [birth year] through [address]. A shingles vaccine is also recommended once in a lifetime after age 61. Your Medicare Wellness Exam is recommended annually. Here is a list of your current Health Maintenance items with a due date: There are no preventive care reminders to display for this patient. Introducing [facility] & HEALTH SERVICES! [facility] introduces [email or web address] patient portal. Now you can access parts of your medical record, email your doctor's office, and request medication refills online. 1. In your internet browser, go to [email or web address] 2. Click on the First Time User? Click Here link in the Sign In box. You will see the New Member Sign Up page. 3. Enter your [email or web address] Access Code exactly as it appears below. You will not need to use this code after youve completed the sign-up process. If you do not sign up before the expiration date, you must request a new code. · [email or web address] Access Code: [email or web address] Expires: 2/5/2018  2:33 PM 
 
4. Enter the last four digits of your Social Security Number (xxxx) and Date of Birth (mm/dd/yyyy) as indicated and click Submit. You will be taken to the next sign-up page. 5. Create a [email or web address] ID. This will be your [email or web address] login ID and cannot be changed, so think of one that is secure and easy to remember. 6. Create a [email or web address] password. You can change your password at any time. 7. Enter your Password Reset Question and Answer. This can be used at a later time if you forget your password. 8. Enter your e-mail address. You will receive e-mail notification when new information is available in [address] 9. Click Sign Up. You can now view and download portions of your medical record. 10. Click the Download Summary menu link to download a portable copy of your medical information. If you have questions, please visit the Frequently Asked Questions section of the Pan[employer] website. Remember, [employer] is NOT to be used for urgent needs. For medical emergencies, dial 911. Now available from your iPhone and Android! Please provide this summary of care documentation to your next provider. Your primary care clinician is listed as Casandra Aguero. If you have any questions after today's visit, please call [phone].

## 2018-01-31 ENCOUNTER — OFFICE VISIT (OUTPATIENT)
Dept: INTERNAL MEDICINE CLINIC | Age: 83
End: 2018-01-31

## 2018-01-31 VITALS
TEMPERATURE: 98 F | RESPIRATION RATE: 16 BRPM | SYSTOLIC BLOOD PRESSURE: 133 MMHG | WEIGHT: 110 LBS | HEART RATE: 69 BPM | HEIGHT: 62 IN | BODY MASS INDEX: 20.24 KG/M2 | DIASTOLIC BLOOD PRESSURE: 70 MMHG | OXYGEN SATURATION: 95 %

## 2018-01-31 DIAGNOSIS — R05.9 COUGH: Primary | ICD-10-CM

## 2018-01-31 DIAGNOSIS — R10.13 DYSPEPSIA: ICD-10-CM

## 2018-01-31 DIAGNOSIS — H91.93 BILATERAL HEARING LOSS, UNSPECIFIED HEARING LOSS TYPE: ICD-10-CM

## 2018-01-31 DIAGNOSIS — R49.0 HOARSENESS OF VOICE: ICD-10-CM

## 2018-01-31 DIAGNOSIS — M80.00XS OSTEOPOROSIS WITH PATHOLOGICAL FRACTURE, SEQUELA: ICD-10-CM

## 2018-01-31 DIAGNOSIS — H04.129 DRY EYE: ICD-10-CM

## 2018-01-31 RX ORDER — FAMOTIDINE 40 MG/1
40 TABLET, FILM COATED ORAL DAILY
Qty: 30 TAB | Refills: 0 | Status: SHIPPED | OUTPATIENT
Start: 2018-01-31 | End: 2018-05-07

## 2018-01-31 NOTE — MR AVS SNAPSHOT
Senora Coffee 
 
 
 [address] [facility] [address] [address] [phone] Patient: Reji Umana MRN: [id number] WON:[birth year] Visit Information Date & Time Provider Department Dept. Phone Encounter #  
 1/31/2018 12:45 PM MD Domenica Youssef [phone] [id number] Follow-up Instructions Return for 3-4 weeks cough. Your Appointments 4/30/2018  9:00 AM  
LAB with LAB [facility] Domenica Chacon [facility] Appt Note: Fasting Labs ([place]) [address] [facility] [address] [address] [phone]  
  
   
 [address]  
  
    
 5/7/2018  9:00 AM  
ROUTINE CARE with MD Domenica Youssef [facility] Appt Note: 6 months (around 5/7/2018) for chol, vit d, labs prior [address] [facility] [address] [address] [phone]  
  
   
 [address] Upcoming Health Maintenance Date Due  
 GLAUCOMA SCREENING Q2Y 10/1/2018 MEDICARE YEARLY EXAM 11/8/2018 DTaP/Tdap/Td series (2 - Td) 10/19/2021 Allergies as of 1/31/2018  Review Complete On: 1/31/2018 By: Patricia Nogueira MD  
  
 Severity Noted Reaction Type Reactions Lipitor [Atorvastatin]  02/24/2016    Myalgia Pravastatin  10/14/2014    Myalgia Zetia [Ezetimibe]  06/10/2016    Other (comments)  
 itching Current Immunizations  Reviewed on 11/10/2016 Name Date Influenza High Dose Vaccine PF 11/10/2016, 10/14/2014 Influenza Vaccine 10/30/2013 Influenza Vaccine Split 10/19/2011, 10/19/2010 Pneumococcal Conjugate (PCV-13) 7/6/2015 TD Vaccine 6/15/2005 TDAP Vaccine 10/19/2011 ZZZ-RETIRED (DO NOT USE) Pneumococcal Vaccine (Unspecified Type) 6/7/2000 Not reviewed this visit You Were Diagnosed With   
  
 Codes Comments Cough    -  Primary ICD-10-CM: Y38 ICD-9-CM: 500. 2 Dyspepsia     ICD-10-CM: R10.13 ICD-9-CM: 536.8 Dry eye     ICD-10-CM: P95.958 ICD-9-CM: 375.15 Osteoporosis with pathological fracture, sequela     ICD-10-CM: M80.00XS ICD-9-CM: 905.5 Bilateral hearing loss, unspecified hearing loss type     ICD-10-CM: H91.93 
ICD-9-CM: 389.9 Hoarseness of voice     ICD-10-CM: R49.0 ICD-9-CM: 784.42 Vitals BP Pulse Temp Resp Height(growth percentile) Weight(growth percentile) 133/70 69 98 °F (36.7 °C) (Oral) 16 5' 2\" (1.575 m) 110 lb (49.9 kg) SpO2 BMI OB Status Smoking Status 95% 20.12 kg/m2 Postmenopausal Never Smoker Vitals History BMI and BSA Data Body Mass Index Body Surface Area  
 20.12 kg/m 2 1.48 m 2 Preferred Pharmacy Pharmacy Name Phone [facility] [address] [phone] Your Updated Medication List  
  
   
This list is accurate as of: 1/31/18  1:12 PM.  Always use your most recent med list.  
  
  
  
  
 Cholecalciferol (Vitamin D3) 2,000 unit Cap capsule Commonly known as:  VITAMIN D3 Take 2,000 Units by mouth daily. famotidine 40 mg tablet Commonly known as:  PEPCID Take 1 Tab by mouth daily. OCUVITE PO Take  by mouth daily. pravastatin 20 mg tablet Commonly known as:  PRAVACHOL  
TAKE ONE TABLET BY MOUTH EVERY DAY  
  
 TYLENOL PO Take  by mouth as needed. Prescriptions Sent to Pharmacy Refills  
 famotidine (PEPCID) 40 mg tablet 0 Sig: Take 1 Tab by mouth daily. Class: Normal  
 Pharmacy: [facility] [address] MAITE CASILLAS [address] [facility], [address] [facility] Ph #: [phone] Route: Oral  
  
We Performed the Following REFERRAL TO ENT-OTOLARYNGOLOGY [RAO07 Custom] Follow-up Instructions Return for 3-4 weeks cough. To-Do List   
 01/31/2018 Imaging:  XR CHEST PA LAT Referral Information Referral ID Referred By Referred To [id number] [facility] Not Available Visits Status Start Date End Date 1 New Request 1/31/18 1/31/19 If your referral has a status of pending review or denied, additional information will be sent to support the outcome of this decision. Referral ID Referred By Referred To [id number] Arsh BOWENS, [facility] [facility] Brittaney Ryan 29 [address] [facility], [address] [place] [address] Fax: [phone] Visits Status Start Date End Date 1 New Request 1/31/18 1/31/19 If your referral has a status of pending review or denied, additional information will be sent to support the outcome of this decision. Introducing hospitals & HEALTH SERVICES! Marianela Mccain introduces [employer] patient portal. Now you can access parts of your medical record, email your doctor's office, and request medication refills online. 1. In your internet browser, go to [email or web address] (Social Influence Quotient)t 2. Click on the First Time User? Click Here link in the Sign In box. You will see the New Member Sign Up page. 3. Enter your [employer] Access Code exactly as it appears below. You will not need to use this code after youve completed the sign-up process. If you do not sign up before the expiration date, you must request a new code. · [employer] Access Code: [email or web address] Expires: 2/5/2018  2:33 PM 
 
4. Enter the last four digits of your Social Security Number (xxxx) and Date of Birth (mm/dd/yyyy) as indicated and click Submit. You will be taken to the next sign-up page. 5. Create a Vigilentt ID. This will be your [employer]ition login ID and cannot be changed, so think of one that is secure and easy to remember. 6. Create a Vigilentt password. You can change your password at any time. 7. Enter your Password Reset Question and Answer. This can be used at a later time if you forget your password. 8. Enter your e-mail address. You will receive e-mail notification when new information is available in [address] 9. Click Sign Up. You can now view and download portions of your medical record. 10. Click the Download Summary menu link to download a portable copy of your medical information. If you have questions, please visit the Frequently Asked Questions section of the [employer] website. Remember, [employer] is NOT to be used for urgent needs. For medical emergencies, dial 911. Now available from your iPhone and Android! Please provide this summary of care documentation to your next provider. Your primary care clinician is listed as Casandra Aguero. If you have any questions after today's visit, please call [phone].

## 2018-01-31 NOTE — PROGRESS NOTES
HPI  Ms. Tayler Joseph is a 80y.o. year old female, she is seen today for cough and hoarse voice for at least a year. Only coughs when talking and loses voice while talking. Also notes sharp pain luq when bends over for about a week. Resolves when gets upright. Had a \"cold\" in November with discharge from eyes for a few days. Now better. Hasn't felt as well since then. More tried. Complains of heartburn with certain foods. No melena or brbpr. No sob. No wheezing. Sometimes coughs at night. No PND. Has to sleep sitting up somewhat due to leg pain and back pain. Occasional chills, no fevers. No rhinorrhea or nasal congestion. No abdominal pain. No edema any different than usual.        Chief Complaint   Patient presents with    Cough     chronic cough    Other     voice hoarse        Prior to Admission medications    Medication Sig Start Date End Date Taking? Authorizing Provider   Cholecalciferol, Vitamin D3, (VITAMIN D3) 2,000 unit cap capsule Take 2,000 Units by mouth daily. 11/7/17  Yes Maryjane Vazquez MD   ACETAMINOPHEN (TYLENOL PO) Take  by mouth as needed. Yes Historical Provider   pravastatin (PRAVACHOL) 20 mg tablet TAKE ONE TABLET BY MOUTH EVERY DAY 5/10/17  Yes Maryjane Vazquez MD   VIT C/VIT E/LUTEIN/MIN/OMEGA-3 (OCUVITE PO) Take  by mouth daily. Yes Historical Provider         Allergies   Allergen Reactions    Lipitor [Atorvastatin] Myalgia    Pravastatin Myalgia    Zetia [Ezetimibe] Other (comments)     itching         REVIEW OF SYSTEMS:  Per HPI    PHYSICAL EXAM:  Visit Vitals    /70    Pulse 69    Temp 98 °F (36.7 °C) (Oral)    Resp 16    Ht 5' 2\" (1.575 m)    Wt 110 lb (49.9 kg)    SpO2 95%    BMI 20.12 kg/m2     Constitutional: Appears well-developed and well-nourished. No distress. HENT:   Head: Normocephalic and atraumatic. Eyes: No scleral icterus.    Mouth: OP clear without lesions, no pharyngeal exudate  Neck: no lad, no tm, supple Cardiovascular: Normal S1/S2, regular rhythm. No murmurs, rubs, or gallops. Pulmonary/Chest: Effort normal and breath sounds normal. No respiratory distress. No wheezes, rhonchi, or rales. Abdomen: Soft, NT/ND, +BS, no rebound or guarding, no masses, no HSM appreciated. Ext: trace b/l edema. Neurological: Alert. Psychiatric: Normal mood and affect. Behavior is normal.     Lab Results   Component Value Date/Time    Sodium 140 11/02/2017 10:04 AM    Potassium 4.5 11/02/2017 10:04 AM    Chloride 100 11/02/2017 10:04 AM    CO2 25 11/02/2017 10:04 AM    Anion gap 5 10/12/2010 08:57 AM    Glucose 93 11/02/2017 10:04 AM    BUN 13 11/02/2017 10:04 AM    Creatinine 0.77 11/02/2017 10:04 AM    BUN/Creatinine ratio 17 11/02/2017 10:04 AM    GFR est AA 81 11/02/2017 10:04 AM    GFR est non-AA 71 11/02/2017 10:04 AM    Calcium 8.8 11/02/2017 10:04 AM    Bilirubin, total 0.5 11/02/2017 10:04 AM    AST (SGOT) 21 11/02/2017 10:04 AM    Alk. phosphatase 46 11/02/2017 10:04 AM    Protein, total 6.6 11/02/2017 10:04 AM    Albumin 4.4 11/02/2017 10:04 AM    Globulin 2.9 10/12/2010 08:57 AM    A-G Ratio 2.0 11/02/2017 10:04 AM    ALT (SGPT) 14 11/02/2017 10:04 AM     No results found for: HBA1C, HGBE8, YEH4WDPK   Lab Results   Component Value Date/Time    Cholesterol, total 198 11/02/2017 10:04 AM    HDL Cholesterol 85 11/02/2017 10:04 AM    LDL, calculated 101 11/02/2017 10:04 AM    VLDL, calculated 12 11/02/2017 10:04 AM    Triglyceride 60 11/02/2017 10:04 AM    CHOL/HDL Ratio 2.6 10/12/2010 08:57 AM          ASSESSMENT/PLAN  Diagnoses and all orders for this visit:    1. Cough  -     XR CHEST PA LAT; Future  -     famotidine (PEPCID) 40 mg tablet; Take 1 Tab by mouth daily. 2. Dyspepsia  -     famotidine (PEPCID) 40 mg tablet; Take 1 Tab by mouth daily. Suspect source of hoarseness and cough is gerd - start above, get cxr and refer to ENT for eval vocal cords  3. Dry eye  Around eyelids - try vaseline on eyelids  4. Osteoporosis with pathological fracture, sequela    5. Bilateral hearing loss, unspecified hearing loss type  -     REFERRAL TO ENT-OTOLARYNGOLOGY    6. Hoarseness of voice  -     REFERRAL TO ENT-OTOLARYNGOLOGY        There are no preventive care reminders to display for this patient. Follow-up Disposition:  Return for 3-4 weeks cough. Reviewed plan of care. Patient has provided input and agrees with goals. The nurse provided the patient and/or family with advanced directive information if needed and encouraged the patient to provide a copy to the office when available.

## 2018-01-31 NOTE — PROGRESS NOTES
Reviewed record  In preparation for visit and have obtained necessary documentation. 1. Have you been to the ER, urgent care clinic since your last visit? Hospitalized since your last visit?no  2. Have you seen or consulted any other health care providers outside of the [address] [place] [address] since your last visit? Include any pap smears or colon screening. no  Advanced directives: on file  Patients vital signs discussed with physician.

## 2018-02-07 ENCOUNTER — HOSPITAL ENCOUNTER (OUTPATIENT)
Dept: GENERAL RADIOLOGY | Age: 83
Discharge: HOME | End: 2018-02-07
Payer: MEDICARE

## 2018-02-07 DIAGNOSIS — R05.9 COUGH: ICD-10-CM

## 2018-02-07 PROCEDURE — 71046 X-RAY EXAM CHEST 2 VIEWS: CPT

## 2018-04-30 ENCOUNTER — APPOINTMENT (OUTPATIENT)
Dept: INTERNAL MEDICINE CLINIC | Facility: CLINIC | Age: 83
End: 2018-04-30

## 2018-04-30 DIAGNOSIS — E78.00 HYPERCHOLESTEROLEMIA: ICD-10-CM

## 2018-04-30 DIAGNOSIS — E55.9 VITAMIN D DEFICIENCY: ICD-10-CM

## 2018-05-01 LAB
25(OH)D3+25(OH)D2 SERPL-MCNC: 27.6 NG/ML (ref 30–100)
ALBUMIN SERPL-MCNC: 4.5 G/DL (ref 3.5–4.7)
ALBUMIN/GLOB SERPL: 2.1 {RATIO} (ref 1.2–2.2)
ALP SERPL-CCNC: 48 IU/L (ref 39–117)
ALT SERPL-CCNC: 12 IU/L (ref 0–32)
AST SERPL-CCNC: 18 IU/L (ref 0–40)
BILIRUB SERPL-MCNC: 0.5 MG/DL (ref 0–1.2)
BUN SERPL-MCNC: 15 MG/DL (ref 8–27)
BUN/CREAT SERPL: 18 (ref 12–28)
CALCIUM SERPL-MCNC: 8.7 MG/DL (ref 8.7–10.3)
CHLORIDE SERPL-SCNC: 100 MMOL/L (ref 96–106)
CHOLEST SERPL-MCNC: 185 MG/DL (ref 100–199)
CO2 SERPL-SCNC: 24 MMOL/L (ref 18–29)
CREAT SERPL-MCNC: 0.84 MG/DL (ref 0.57–1)
GFR SERPLBLD CREATININE-BSD FMLA CKD-EPI: 63 ML/MIN/1.73
GFR SERPLBLD CREATININE-BSD FMLA CKD-EPI: 73 ML/MIN/1.73
GLOBULIN SER CALC-MCNC: 2.1 G/DL (ref 1.5–4.5)
GLUCOSE SERPL-MCNC: 94 MG/DL (ref 65–99)
HDLC SERPL-MCNC: 77 MG/DL
LDLC SERPL CALC-MCNC: 95 MG/DL (ref 0–99)
POTASSIUM SERPL-SCNC: 4.1 MMOL/L (ref 3.5–5.2)
PROT SERPL-MCNC: 6.6 G/DL (ref 6–8.5)
SODIUM SERPL-SCNC: 139 MMOL/L (ref 134–144)
TRIGL SERPL-MCNC: 63 MG/DL (ref 0–149)
VLDLC SERPL CALC-MCNC: 13 MG/DL (ref 5–40)

## 2018-05-07 ENCOUNTER — OFFICE VISIT (OUTPATIENT)
Dept: INTERNAL MEDICINE CLINIC | Facility: CLINIC | Age: 83
End: 2018-05-07

## 2018-05-07 VITALS
RESPIRATION RATE: 16 BRPM | HEART RATE: 68 BPM | SYSTOLIC BLOOD PRESSURE: 118 MMHG | TEMPERATURE: 97.8 F | WEIGHT: 112.4 LBS | DIASTOLIC BLOOD PRESSURE: 70 MMHG | HEIGHT: 62 IN | OXYGEN SATURATION: 98 % | BODY MASS INDEX: 20.68 KG/M2

## 2018-05-07 DIAGNOSIS — E78.00 HYPERCHOLESTEROLEMIA: ICD-10-CM

## 2018-05-07 DIAGNOSIS — E55.9 VITAMIN D DEFICIENCY: ICD-10-CM

## 2018-05-07 DIAGNOSIS — R07.9 CHEST PAIN, UNSPECIFIED TYPE: Primary | ICD-10-CM

## 2018-05-07 NOTE — MR AVS SNAPSHOT
[address] [address] [phone] Patient: Kristel Valdez MRN: [id number] Visit Information Date & Time Provider Department Dept. Phone Encounter #  
 5/7/2018  9:00 AM Jaron Pham MD [facility] Internal Medicine of [address] [place] [address] [id number] Follow-up Instructions Return in about 6 months (around 11/7/2018), or if symptoms worsen or fail to improve, for chol, vit d, labs prior. Upcoming Health Maintenance Date Due Influenza Age 5 to Adult 8/1/2018 GLAUCOMA SCREENING Q2Y 10/1/2018 MEDICARE YEARLY EXAM 11/8/2018 DTaP/Tdap/Td series (2 - Td) 10/19/2021 Allergies as of 5/7/2018  Review Complete On: 5/7/2018 By: Jaron Pham MD  
  
 Severity Noted Reaction Type Reactions Lipitor [Atorvastatin]  02/24/2016    Myalgia Pravastatin  10/14/2014    Myalgia Zetia [Ezetimibe]  06/10/2016    Other (comments)  
 itching Current Immunizations  Reviewed on 11/10/2016 Name Date Influenza High Dose Vaccine PF 11/10/2016, 10/14/2014 Influenza Vaccine 10/30/2013 Influenza Vaccine Split 10/19/2011, 10/19/2010 Pneumococcal Conjugate (PCV-13) 7/6/2015 TD Vaccine 6/15/2005 TDAP Vaccine 10/19/2011 ZZZ-RETIRED (DO NOT USE) Pneumococcal Vaccine (Unspecified Type) 6/7/2000 Not reviewed this visit You Were Diagnosed With   
  
 Codes Comments Chest pain, unspecified type    -  Primary ICD-10-CM: R07.9 ICD-9-CM: 786.50 Vitamin D deficiency     ICD-10-CM: E55.9 ICD-9-CM: 268.9 Hypercholesterolemia     ICD-10-CM: E78.00 ICD-9-CM: 272.0 Vitals BP Pulse Temp Resp Height(growth percentile) Weight(growth percentile) 118/70 (BP 1 Location: Left arm, BP Patient Position: Sitting) 68 97.8 °F (36.6 °C) (Oral) 16 5' 2\" (1.575 m) 112 lb 6.4 oz (51 kg) SpO2 BMI OB Status Smoking Status 98% 20.56 kg/m2 Postmenopausal Never Smoker Vitals History BMI and BSA Data Body Mass Index Body Surface Area 20.56 kg/m 2 1.49 m 2 Preferred Pharmacy Pharmacy Name Phone [facility] [address] [facility]  [phone] Your Updated Medication List  
  
   
This list is accurate as of 5/7/18  9:54 AM.  Always use your most recent med list.  
  
  
  
  
 Cholecalciferol (Vitamin D3) 2,000 unit Cap capsule Commonly known as:  VITAMIN D3 Take 2,000 Units by mouth daily. OCUVITE PO Take  by mouth daily. pravastatin 20 mg tablet Commonly known as:  PRAVACHOL  
TAKE ONE TABLET BY MOUTH EVERY DAY  
  
 TYLENOL PO Take  by mouth as needed. We Performed the Following AMB POC EKG ROUTINE W/ 12 LEADS, INTER & REP [78219 CPT(R)] Follow-up Instructions Return in about 6 months (around 11/7/2018), or if symptoms worsen or fail to improve, for chol, vit d, labs prior. To-Do List   
 11/03/2018 Lab:  LIPID PANEL   
  
 11/03/2018 Lab:  METABOLIC PANEL, COMPREHENSIVE   
  
 11/03/2018 Lab:  VITAMIN D, 25 HYDROXY Introducing [facility]! [facility] introduces [email or web address] patient portal. Now you can access parts of your medical record, email your doctor's office, and request medication refills online. 1. In your internet browser, go to [email or web address] 2. Click on the First Time User? Click Here link in the Sign In box. You will see the New Member Sign Up page. 3. Enter your [email or web address] Access Code exactly as it appears below. You will not need to use this code after youve completed the sign-up process. If you do not sign up before the expiration date, you must request a new code. · [email or web address] Access Code: [email or web address] Expires: 5/8/2018 11:23 AM 
 
4.  Enter the last four digits of your Social Security Number (xxxx) and Date of Birth (mm/dd/yyyy) as indicated and click Submit. You will be taken to the next sign-up page. 5. Create a [email or web address] ID. This will be your [email or web address] login ID and cannot be changed, so think of one that is secure and easy to remember. 6. Create a [email or web address] password. You can change your password at any time. 7. Enter your Password Reset Question and Answer. This can be used at a later time if you forget your password. 8. Enter your e-mail address. You will receive e-mail notification when new information is available in [address] 9. Click Sign Up. You can now view and download portions of your medical record. 10. Click the Download Summary menu link to download a portable copy of your medical information. If you have questions, please visit the Frequently Asked Questions section of the [email or web address] website. Remember, [email or web address] is NOT to be used for urgent needs. For medical emergencies, dial 911. Now available from your iPhone and Android! Please provide this summary of care documentation to your next provider. Your primary care clinician is listed as Casandra Aguero. If you have any questions after today's visit, please call [phone].

## 2018-05-07 NOTE — PROGRESS NOTES
HPI  Ms. Diane Callejas is a 80y.o. year old female, she is seen today for follow up high cholesterol, vit d deficiency. Says cough has improved, voice still hoarse at times. No dysphagia. Says had chest pain 2 days ago and left arm heavy after digging in her garden, was also sob a little at the time. Rest improved pain. No dizziness or lightheaded. Felt uncomfortable in her chest and left arm heavy to lift. No diaphoresis or nausea. Never happened before. Was back to normal the next day. Hasn't gardened for extended periods since. Gets nosebleeds with aspirin so doesn't take it anymore. Says was seen by ENT who said she had polyps causing bleeding - cauterized in office which helped temporarily. Edema in ankles stable, normally daily since hip surgery. No PND or orthopnea. Chief Complaint   Patient presents with    Cholesterol Problem     Room 2C//     Vitamin D Deficiency        Prior to Admission medications    Medication Sig Start Date End Date Taking? Authorizing Provider   Cholecalciferol, Vitamin D3, (VITAMIN D3) 2,000 unit cap capsule Take 2,000 Units by mouth daily. 11/7/17  Yes Maria Victoria Majano MD   ACETAMINOPHEN (TYLENOL PO) Take  by mouth as needed. Yes Historical Provider   pravastatin (PRAVACHOL) 20 mg tablet TAKE ONE TABLET BY MOUTH EVERY DAY 5/10/17  Yes Maria Victoria Majano MD   VIT C/VIT E/LUTEIN/MIN/OMEGA-3 (OCUVITE PO) Take  by mouth daily. Yes Historical Provider         Allergies   Allergen Reactions    Lipitor [Atorvastatin] Myalgia    Pravastatin Myalgia    Zetia [Ezetimibe] Other (comments)     itching         REVIEW OF SYSTEMS:  Per HPI    PHYSICAL EXAM:  Visit Vitals    /70 (BP 1 Location: Left arm, BP Patient Position: Sitting)    Pulse 68    Temp 97.8 °F (36.6 °C) (Oral)    Resp 16    Ht 5' 2\" (1.575 m)    Wt 112 lb 6.4 oz (51 kg)    SpO2 98%    BMI 20.56 kg/m2     Constitutional: Appears well-developed and well-nourished. No distress.    HENT: Head: Normocephalic and atraumatic. Eyes: No scleral icterus. Cardiovascular: Normal S1/S2, regular rhythm. No murmurs, rubs, or gallops. Pulmonary/Chest: Effort normal and breath sounds normal. No respiratory distress. No wheezes, rhonchi, or rales. Abdomen: Soft, NT/ND, +BS, no rebound or guarding, no masses, no HSM appreciated. Ext: No edema rle, trace lle. Neurological: Alert. Psychiatric: Normal mood and affect. Behavior is normal.     Lab Results   Component Value Date/Time    Sodium 139 04/30/2018 08:59 AM    Potassium 4.1 04/30/2018 08:59 AM    Chloride 100 04/30/2018 08:59 AM    CO2 24 04/30/2018 08:59 AM    Anion gap 5 10/12/2010 08:57 AM    Glucose 94 04/30/2018 08:59 AM    BUN 15 04/30/2018 08:59 AM    Creatinine 0.84 04/30/2018 08:59 AM    BUN/Creatinine ratio 18 04/30/2018 08:59 AM    GFR est AA 73 04/30/2018 08:59 AM    GFR est non-AA 63 04/30/2018 08:59 AM    Calcium 8.7 04/30/2018 08:59 AM    Bilirubin, total 0.5 04/30/2018 08:59 AM    AST (SGOT) 18 04/30/2018 08:59 AM    Alk. phosphatase 48 04/30/2018 08:59 AM    Protein, total 6.6 04/30/2018 08:59 AM    Albumin 4.5 04/30/2018 08:59 AM    Globulin 2.9 10/12/2010 08:57 AM    A-G Ratio 2.1 04/30/2018 08:59 AM    ALT (SGPT) 12 04/30/2018 08:59 AM     No results found for: HBA1C, HGBE8, HDC0ARYE, JLD8HXVE   Lab Results   Component Value Date/Time    Cholesterol, total 185 04/30/2018 08:59 AM    HDL Cholesterol 77 04/30/2018 08:59 AM    LDL, calculated 95 04/30/2018 08:59 AM    VLDL, calculated 13 04/30/2018 08:59 AM    Triglyceride 63 04/30/2018 08:59 AM    CHOL/HDL Ratio 2.6 10/12/2010 08:57 AM          ASSESSMENT/PLAN  Diagnoses and all orders for this visit:    1. Chest pain, unspecified type  -     AMB POC EKG ROUTINE W/ 12 LEADS, INTER & REP  -     METABOLIC PANEL, COMPREHENSIVE;  Future  EKG NSR - chest pain and heaviness could have been MSK in nature - if recurs will get stress test and advised to take 4 asa 81mg daily if she develops chest pressure again  2. Vitamin D deficiency  -     VITAMIN D, 25 HYDROXY; Future  Almost to goal - continue current medications   3. Hypercholesterolemia  -     LIPID PANEL; Future  continue current medications - at goal      There are no preventive care reminders to display for this patient. Follow-up Disposition:  Return in about 6 months (around 11/7/2018), or if symptoms worsen or fail to improve, for chol, vit d, labs prior. Reviewed plan of care. Patient has provided input and agrees with goals. The nurse provided the patient and/or family with advanced directive information if needed and encouraged the patient to provide a copy to the office when available.

## 2018-05-07 NOTE — PROGRESS NOTES
Diane Callejas    Chief Complaint   Patient presents with    Cholesterol Problem     Room 2C//     Vitamin D Deficiency           Reviewed record In preparation for visit and have obtained necessary documentation    1. Have you been to the ER, urgent care clinic since your last visit? Hospitalized since your last visit? NO  2. Have you seen or consulted any other health care providers outside of the [address] [place] [address] since your last visit? Include any pap smears or colon screening. NO    Patient has advance directive on file    Dr Aram Cosby advised of reason for visit and vitals    There are no preventive care reminders to display for this patient. Wt Readings from Last 3 Encounters:   05/07/18 112 lb 6.4 oz (51 kg)   01/31/18 110 lb (49.9 kg)   11/07/17 108 lb 3.2 oz (49.1 kg)     Temp Readings from Last 3 Encounters:   01/31/18 98 °F (36.7 °C) (Oral)   11/07/17 97.4 °F (36.3 °C) (Oral)   05/10/17 97.6 °F (36.4 °C) (Oral)     BP Readings from Last 3 Encounters:   01/31/18 133/70   11/07/17 101/60   05/10/17 111/64     Pulse Readings from Last 3 Encounters:   01/31/18 69   11/07/17 65   05/10/17 67         Learning Assessment:  :     Learning Assessment 6/6/2014   PRIMARY LEARNER Patient   HIGHEST LEVEL OF EDUCATION - PRIMARY LEARNER  DID NOT GRADUATE HIGH SCHOOL   BARRIERS PRIMARY LEARNER NONE   CO-LEARNER CAREGIVER No   PRIMARY LANGUAGE ENGLISH   LEARNER PREFERENCE PRIMARY DEMONSTRATION   ANSWERED BY patient   RELATIONSHIP SELF       Depression Screening:  :     PHQ over the last two weeks 1/31/2018   Little interest or pleasure in doing things Not at all   Feeling down, depressed or hopeless Not at all   Total Score PHQ 2 0       Fall Risk Assessment:  :     Fall Risk Assessment, last 12 mths 1/31/2018   Able to walk? Yes   Fall in past 12 months? No       Abuse Screening:  :     Abuse Screening Questionnaire 11/7/2017 11/10/2016 6/6/2014   Do you ever feel afraid of your partner?  N N N   Are you in a relationship with someone who physically or mentally threatens you? N N N   Is it safe for you to go home? Nata Elena            I have received verbal consent from Lobo Kevin to discuss any/all medical information while they are present in the room. Medication reconciliation up to date and corrected with patient at this time.

## 2018-07-12 DIAGNOSIS — E78.00 HYPERCHOLESTEROLEMIA: ICD-10-CM

## 2018-07-12 RX ORDER — PRAVASTATIN SODIUM 20 MG/1
TABLET ORAL
Qty: 90 TAB | Refills: 4 | Status: SHIPPED | OUTPATIENT
Start: 2018-07-12 | End: 2019-01-22 | Stop reason: SDUPTHER

## 2018-09-10 ENCOUNTER — TELEPHONE (OUTPATIENT)
Dept: INTERNAL MEDICINE CLINIC | Facility: CLINIC | Age: 83
End: 2018-09-10

## 2018-09-10 NOTE — TELEPHONE ENCOUNTER
Called and spoke with pt. Pt states she is fine seeing the NP tomorrow morning for an ear cleaning.  Appt scheduled for 10:30 am.

## 2018-09-10 NOTE — TELEPHONE ENCOUNTER
Daughter states pt went to have hearing test done and they weren't able to complete due to pt's earwax buildup.    Would like pt to come in to be seen to have wax removed ASAP, offered appt w/ NP = declined, wants pt to see PCP  Further states will have pt w/her until about 3:30p today if able to be seen

## 2018-09-11 ENCOUNTER — OFFICE VISIT (OUTPATIENT)
Dept: INTERNAL MEDICINE CLINIC | Facility: CLINIC | Age: 83
End: 2018-09-11

## 2018-09-11 VITALS
OXYGEN SATURATION: 97 % | TEMPERATURE: 98.4 F | BODY MASS INDEX: 20.06 KG/M2 | SYSTOLIC BLOOD PRESSURE: 112 MMHG | HEART RATE: 77 BPM | WEIGHT: 109 LBS | HEIGHT: 62 IN | RESPIRATION RATE: 16 BRPM | DIASTOLIC BLOOD PRESSURE: 66 MMHG

## 2018-09-11 DIAGNOSIS — H61.23 BILATERAL IMPACTED CERUMEN: ICD-10-CM

## 2018-09-11 DIAGNOSIS — H66.001 ACUTE SUPPURATIVE OTITIS MEDIA OF RIGHT EAR WITHOUT SPONTANEOUS RUPTURE OF TYMPANIC MEMBRANE, RECURRENCE NOT SPECIFIED: Primary | ICD-10-CM

## 2018-09-11 RX ORDER — AMOXICILLIN 500 MG/1
500 CAPSULE ORAL 2 TIMES DAILY
Qty: 10 CAP | Refills: 0 | Status: SHIPPED | OUTPATIENT
Start: 2018-09-11 | End: 2018-09-16

## 2018-09-11 NOTE — PATIENT INSTRUCTIONS
Earwax Blockage: Care Instructions  Your Care Instructions    Earwax is a natural substance that protects the ear canal. Normally, earwax drains from the ears and does not cause problems. Sometimes earwax builds up and hardens. Earwax blockage (also called cerumen impaction) can cause some loss of hearing and pain. When wax is tightly packed, you will need to have your doctor remove it. Follow-up care is a key part of your treatment and safety. Be sure to make and go to all appointments, and call your doctor if you are having problems. It's also a good idea to know your test results and keep a list of the medicines you take. How can you care for yourself at home? · Do not try to remove earwax with cotton swabs, fingers, or other objects. This can make the blockage worse and damage the eardrum. · If your doctor recommends that you try to remove earwax at home:  ¨ Soften and loosen the earwax with warm mineral oil. You also can try hydrogen peroxide mixed with an equal amount of room temperature water. Place 2 drops of the fluid, warmed to body temperature, in the ear two times a day for up to 5 days. ¨ Once the wax is loose and soft, all that is usually needed to remove it from the ear canal is a gentle, warm shower. Direct the water into the ear, then tip your head to let the earwax drain out. Dry your ear thoroughly with a hair dryer set on low. Hold the dryer several inches from your ear. ¨ If the warm mineral oil and shower do not work, use an over-the-counter wax softener. Read and follow all instructions on the label. After using the wax softener, use an ear syringe to gently flush the ear. Make sure the flushing solution is body temperature. Cool or hot fluids in the ear can cause dizziness. When should you call for help? Call your doctor now or seek immediate medical care if:    · Pus or blood drains from your ear.     · Your ears are ringing or feel full.     · You have a loss of hearing.  Watch closely for changes in your health, and be sure to contact your doctor if:    · You have pain or reduced hearing after 1 week of home treatment.     · You have any new symptoms, such as nausea or balance problems. Where can you learn more? Go to http://francisco javier-nadiya.info/. Enter Z906 in the search box to learn more about \"Earwax Blockage: Care Instructions. \"  Current as of: November 20, 2017  Content Version: 11.7  © 4226-5590 "[employer]. Care instructions adapted under license by [employer] (which disclaims liability or warranty for this information). If you have questions about a medical condition or this instruction, always ask your healthcare professional. [address] any warranty or liability for your use of this information. Ear Infection (Otitis Media): Care Instructions  Your Care Instructions    An ear infection may start with a cold and affect the middle ear (otitis media). It can hurt a lot. Most ear infections clear up on their own in a couple of days. Most often you will not need antibiotics. This is because many ear infections are caused by a virus. Antibiotics don't work against a virus. Regular doses of pain medicines are the best way to reduce your fever and help you feel better. Follow-up care is a key part of your treatment and safety. Be sure to make and go to all appointments, and call your doctor if you are having problems. It's also a good idea to know your test results and keep a list of the medicines you take. How can you care for yourself at home? · Take pain medicines exactly as directed. ¨ If the doctor gave you a prescription medicine for pain, take it as prescribed. ¨ If you are not taking a prescription pain medicine, take an over-the-counter medicine, such as acetaminophen (Tylenol), ibuprofen (Advil, Motrin), or naproxen (Aleve). Read and follow all instructions on the label.   ¨ Do not take two or more pain medicines at the same time unless the doctor told you to. Many pain medicines have acetaminophen, which is Tylenol. Too much acetaminophen (Tylenol) can be harmful. · Plan to take a full dose of pain reliever before bedtime. Getting enough sleep will help you get better. · Try a warm, moist washcloth on the ear. It may help relieve pain. · If your doctor prescribed antibiotics, take them as directed. Do not stop taking them just because you feel better. You need to take the full course of antibiotics. When should you call for help? Call your doctor now or seek immediate medical care if:    · You have new or increasing ear pain.     · You have new or increasing pus or blood draining from your ear.     · You have a fever with a stiff neck or a severe headache.    Watch closely for changes in your health, and be sure to contact your doctor if:    · You have new or worse symptoms.     · You are not getting better after taking an antibiotic for 2 days. Where can you learn more? Go to http://francisco javier-nadiya.info/. Enter I457 in the search box to learn more about \"Ear Infection (Otitis Media): Care Instructions. \"  Current as of: May 12, 2017  Content Version: 11.7  © 6079-8034 [employer]. Care instructions adapted under license by [employer] (which disclaims liability or warranty for this information). If you have questions about a medical condition or this instruction, always ask your healthcare professional. [address] [address] any warranty or liability for your use of this information.

## 2018-09-11 NOTE — MR AVS SNAPSHOT
[address] [address] [phone] Patient: Cameron Judge MRN: [id number] VGL:[id number] Visit Information Date & Time Provider Department Dept. Phone Encounter #  
 9/11/2018 10:30 AM Marcy Camejo NP [facility] Internal Medicine of [address] [place] [address] [id number] Follow-up Instructions Return if symptoms worsen or fail to improve. Your Appointments 10/31/2018  8:30 AM  
LAB with LAB [facility] Internal Medicine [facility] Appt Note: Fasting [address] [place] [address]  
  
    
 11/6/2018  9:30 AM  
Any with Zoraiad Ovalle MD  
[facility] Internal Medicine of [facility]) Appt Note: Chol vit d $0cp 05.07.18 Kg; Chol vit d  
 [address] [phone]  
  
   
 [address] [facility] Upcoming Health Maintenance Date Due Influenza Age 5 to Adult 8/1/2018 GLAUCOMA SCREENING Q2Y 10/1/2018 MEDICARE YEARLY EXAM 11/8/2018 DTaP/Tdap/Td series (2 - Td) 10/19/2021 Allergies as of 9/11/2018  Review Complete On: 9/11/2018 By: Bridgette Juárez LPN Severity Noted Reaction Type Reactions Lipitor [Atorvastatin]  02/24/2016    Myalgia Pravastatin  10/14/2014    Myalgia Zetia [Ezetimibe]  06/10/2016    Other (comments)  
 itching Current Immunizations  Reviewed on 9/11/2018 Name Date Influenza High Dose Vaccine PF 11/10/2016, 10/14/2014 Influenza Vaccine 10/30/2013 Influenza Vaccine Split 10/19/2011, 10/19/2010 Pneumococcal Conjugate (PCV-13) 7/6/2015 TD Vaccine 6/15/2005 TDAP Vaccine 10/19/2011 ZZZ-RETIRED (DO NOT USE) Pneumococcal Vaccine (Unspecified Type) 6/7/2000 Reviewed by Bridgette Juárez LPN on 2/18/4333 at 39:94 AM  
You Were Diagnosed With   
  
 Codes Comments Acute suppurative otitis media of right ear without spontaneous rupture of tympanic membrane, recurrence not specified    -  Primary ICD-10-CM: H66.001 ICD-9-CM: 382.00 Bilateral impacted cerumen     ICD-10-CM: H61.23 
ICD-9-CM: 380.4 Vitals BP Pulse Temp Resp Height(growth percentile) Weight(growth percentile) 112/66 (BP 1 Location: Left arm, BP Patient Position: Sitting) 77 98.4 °F (36.9 °C) (Oral) 16 5' 2\" (1.575 m) 109 lb (49.4 kg) SpO2 BMI OB Status Smoking Status 97% 19.94 kg/m2 Postmenopausal Never Smoker BMI and BSA Data Body Mass Index Body Surface Area 19.94 kg/m 2 1.47 m 2 Preferred Pharmacy Pharmacy Name Phone [facility] [address] [address] Luis A Fill [phone] Your Updated Medication List  
  
   
This list is accurate as of 9/11/18 10:44 AM.  Always use your most recent med list.  
  
  
  
  
 amoxicillin 500 mg capsule Commonly known as:  AMOXIL Take 1 Cap by mouth two (2) times a day for 5 days. Cholecalciferol (Vitamin D3) 2,000 unit Cap capsule Commonly known as:  VITAMIN D3 Take 2,000 Units by mouth daily. OCUVITE PO Take  by mouth daily. pravastatin 20 mg tablet Commonly known as:  PRAVACHOL  
TAKE 1 TABLET EVERY DAY  (  DOSE  LOWERED) TYLENOL PO Take  by mouth as needed. Prescriptions Sent to Pharmacy Refills  
 amoxicillin (AMOXIL) 500 mg capsule 0 Sig: Take 1 Cap by mouth two (2) times a day for 5 days. Class: Normal  
 Pharmacy: [facility] [address] MAITE CASILLAS [address] [facility], [address] [place] [address] Drive Ph #: [phone] Route: Oral  
  
We Performed the Following SC REMOVAL IMPACTED CERUMEN IRRIGATION/LVG UNILAT [39665 CPT(R)] Follow-up Instructions Return if symptoms worsen or fail to improve. Patient Instructions Earwax Blockage: Care Instructions Your Care Instructions Earwax is a natural substance that protects the ear canal. Normally, earwax drains from the ears and does not cause problems. Sometimes earwax builds up and hardens. Earwax blockage (also called cerumen impaction) can cause some loss of hearing and pain. When wax is tightly packed, you will need to have your doctor remove it. Follow-up care is a key part of your treatment and safety. Be sure to make and go to all appointments, and call your doctor if you are having problems. It's also a good idea to know your test results and keep a list of the medicines you take. How can you care for yourself at home? · Do not try to remove earwax with cotton swabs, fingers, or other objects. This can make the blockage worse and damage the eardrum. · If your doctor recommends that you try to remove earwax at home: ¨ Soften and loosen the earwax with warm mineral oil. You also can try hydrogen peroxide mixed with an equal amount of room temperature water. Place 2 drops of the fluid, warmed to body temperature, in the ear two times a day for up to 5 days. ¨ Once the wax is loose and soft, all that is usually needed to remove it from the ear canal is a gentle, warm shower. Direct the water into the ear, then tip your head to let the earwax drain out. Dry your ear thoroughly with a hair dryer set on low. Hold the dryer several inches from your ear. ¨ If the warm mineral oil and shower do not work, use an over-the-counter wax softener. Read and follow all instructions on the label. After using the wax softener, use an ear syringe to gently flush the ear. Make sure the flushing solution is body temperature. Cool or hot fluids in the ear can cause dizziness. When should you call for help?  
Call your doctor now or seek immediate medical care if: 
  · Pus or blood drains from your ear.  
  · Your ears are ringing or feel full.  
  · You have a loss of hearing.  
 Watch closely for changes in your health, and be sure to contact your doctor if: 
  · You have pain or reduced hearing after 1 week of home treatment.  
  · You have any new symptoms, such as nausea or balance problems. Where can you learn more? Go to http://francisco javier-nadiya.info/. Enter P939 in the search box to learn more about \"Earwax Blockage: Care Instructions. \" Current as of: November 20, 2017 Content Version: 11.7 © 0899-0788 [employer]. Care instructions adapted under license by [employer] (which disclaims liability or warranty for this information). If you have questions about a medical condition or this instruction, always ask your healthcare professional. [address] [address] any warranty or liability for your use of this information. Ear Infection (Otitis Media): Care Instructions Your Care Instructions An ear infection may start with a cold and affect the middle ear (otitis media). It can hurt a lot. Most ear infections clear up on their own in a couple of days. Most often you will not need antibiotics. This is because many ear infections are caused by a virus. Antibiotics don't work against a virus. Regular doses of pain medicines are the best way to reduce your fever and help you feel better. Follow-up care is a key part of your treatment and safety. Be sure to make and go to all appointments, and call your doctor if you are having problems. It's also a good idea to know your test results and keep a list of the medicines you take. How can you care for yourself at home? · Take pain medicines exactly as directed. ¨ If the doctor gave you a prescription medicine for pain, take it as prescribed. ¨ If you are not taking a prescription pain medicine, take an over-the-counter medicine, such as acetaminophen (Tylenol), ibuprofen (Advil, Motrin), or naproxen (Aleve). Read and follow all instructions on the label.  
¨ Do not take two or more pain medicines at the same time unless the doctor told you to. Many pain medicines have acetaminophen, which is Tylenol. Too much acetaminophen (Tylenol) can be harmful. · Plan to take a full dose of pain reliever before bedtime. Getting enough sleep will help you get better. · Try a warm, moist washcloth on the ear. It may help relieve pain. · If your doctor prescribed antibiotics, take them as directed. Do not stop taking them just because you feel better. You need to take the full course of antibiotics. When should you call for help? Call your doctor now or seek immediate medical care if: 
  · You have new or increasing ear pain.  
  · You have new or increasing pus or blood draining from your ear.  
  · You have a fever with a stiff neck or a severe headache.  
 Watch closely for changes in your health, and be sure to contact your doctor if: 
  · You have new or worse symptoms.  
  · You are not getting better after taking an antibiotic for 2 days. Where can you learn more? Go to http://francisco javier-nadiya.info/. Enter H178 in the search box to learn more about \"Ear Infection (Otitis Media): Care Instructions. \" Current as of: May 12, 2017 Content Version: 11.7 © 0004-9798 [employer]. Care instructions adapted under license by [employer] (which disclaims liability or warranty for this information). If you have questions about a medical condition or this instruction, always ask your healthcare professional. [address] any warranty or liability for your use of this information. Introducing [facility]! [facility] introduces Social [employer] Gateways patient portal. Now you can access parts of your medical record, email your doctor's office, and request medication refills online. 1. In your internet browser, go to [email or web address] 2. Click on the First Time User? Click Here link in the Sign In box. You will see the New Member Sign Up page. 3. Enter your [employer] Access Code exactly as it appears below. You will not need to use this code after youve completed the sign-up process. If you do not sign up before the expiration date, you must request a new code. · [employer] Access Code: [email or web address] Expires: 12/10/2018 10:44 AM 
 
4. Enter the last four digits of your Social Security Number (xxxx) and Date of Birth (mm/dd/yyyy) as indicated and click Submit. You will be taken to the next sign-up page. 5. Create a [employer] ID. This will be your [employer] login ID and cannot be changed, so think of one that is secure and easy to remember. 6. Create a [employer] password. You can change your password at any time. 7. Enter your Password Reset Question and Answer. This can be used at a later time if you forget your password. 8. Enter your e-mail address. You will receive e-mail notification when new information is available in [address] 9. Click Sign Up. You can now view and download portions of your medical record. 10. Click the Download Summary menu link to download a portable copy of your medical information. If you have questions, please visit the Frequently Asked Questions section of the [employer] website. Remember, [employer] is NOT to be used for urgent needs. For medical emergencies, dial 911. Now available from your iPhone and Android! Please provide this summary of care documentation to your next provider. Your primary care clinician is listed as Casandra Aguero. If you have any questions after today's visit, please call [phone].

## 2018-09-11 NOTE — PROGRESS NOTES
HPI  Karla Roman is a 80y.o. year old female patient of Favio Flores MD who presents with c/o ears clogged. Pt has history of has Hypercholesterolemia, Osteoporosis with pathological fracture, Hx-TIA (transient ischemic attack), History of colonoscopy, Chest pain, Presbyacusia, DJD (degenerative joint disease) of hip, DJD (degenerative joint disease), ankle and foot, Macular degeneration, Vitamin D deficiency, and Pathological fracture of vertebra due to osteoporosis with routine healing on her problem list..    Was seen yesterday to get tested for hearing aids and told both ears are clogged with ear wax. Has some pain inside right ear. Denies any drainage from ears. Sometimes hears popping noises in ears. Reports reduced hearing loss bilaterally. Denies any f/c. Patient Active Problem List   Diagnosis Code    Hypercholesterolemia E78.00    Osteoporosis with pathological fracture M80.00XA    Hx-TIA (transient ischemic attack) Z86.73    History of colonoscopy Z98.890    Chest pain R07.9    Presbyacusia H91.10    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 0XD     Past Medical History:   Diagnosis Date    Arthritis     GERD (gastroesophageal reflux disease)     Hx-TIA (transient ischemic attack) 2006    Hypercholesterolemia     Osteoporosis     Other ill-defined conditions(799.89)     high cholesterol    S/P colonoscopy 2001     Past Surgical History:   Procedure Laterality Date    HX ORTHOPAEDIC  2010    left hip replacement    TX COLONOSCOPY FLX DX W/COLLJ SPEC WHEN PFRMD  5/25/2012         STRESS TEST CARDIAC  2007     Social History     Social History    Marital status:      Spouse name: N/A    Number of children: N/A    Years of education: N/A     Social History Main Topics    Smoking status: Never Smoker    Smokeless tobacco: Never Used    Alcohol use No    Drug use: No    Sexual activity: Not Currently     Other Topics Concern    None     Social History Narrative     Family History   Problem Relation Age of Onset    Diabetes Mother     Hypertension Mother     Stroke Mother      Allergies   Allergen Reactions    Lipitor [Atorvastatin] Myalgia    Pravastatin Myalgia    Zetia [Ezetimibe] Other (comments)     itching       MEDICATIONS  Current Outpatient Prescriptions   Medication Sig    pravastatin (PRAVACHOL) 20 mg tablet TAKE 1 TABLET EVERY DAY  (  DOSE  LOWERED)    Cholecalciferol, Vitamin D3, (VITAMIN D3) 2,000 unit cap capsule Take 2,000 Units by mouth daily.  ACETAMINOPHEN (TYLENOL PO) Take  by mouth as needed.  VIT C/VIT E/LUTEIN/MIN/OMEGA-3 (OCUVITE PO) Take  by mouth daily. No current facility-administered medications for this visit. REVIEW OF SYSTEMS  Per HPI        Visit Vitals    /66 (BP 1 Location: Left arm, BP Patient Position: Sitting)    Pulse 77    Temp 98.4 °F (36.9 °C) (Oral)    Resp 16    Ht 5' 2\" (1.575 m)    Wt 109 lb (49.4 kg)    SpO2 97%    BMI 19.94 kg/m2         General: Well-developed, well-nourished. In no distress. A&O x 3. Head: Normocephalic, atraumatic. Eyes: Conjunctiva clear. Pupils equal, round, reactive to light. Extraocular movements intact. Ears/Nose: Bilateral cerumen impaction. Following irrigation: right ear canal w/marked erythema, TM normal, Left ear canal and TM normal.  Nares normal. Septum midline. Skin: No rashes or lesions. Musculoskeletal: Gait normal.   Psychiatric: Normal mood and affect. Behavior is normal.       ASSESSMENT and PLAN  Diagnoses and all orders for this visit:    1. Acute suppurative otitis media of right ear without spontaneous rupture of tympanic membrane, recurrence not specified  -     amoxicillin (AMOXIL) 500 mg capsule;  Take 1 Cap by mouth two (2) times a day for 5 days. -recommend holding off on hearing aid fitting until infection resolved    2. Bilateral impacted cerumen  -     TX REMOVAL IMPACTED CERUMEN IRRIGATION/LVG UNILAT            Patient Instructions          Earwax Blockage: Care Instructions  Your Care Instructions    Earwax is a natural substance that protects the ear canal. Normally, earwax drains from the ears and does not cause problems. Sometimes earwax builds up and hardens. Earwax blockage (also called cerumen impaction) can cause some loss of hearing and pain. When wax is tightly packed, you will need to have your doctor remove it. Follow-up care is a key part of your treatment and safety. Be sure to make and go to all appointments, and call your doctor if you are having problems. It's also a good idea to know your test results and keep a list of the medicines you take. How can you care for yourself at home? · Do not try to remove earwax with cotton swabs, fingers, or other objects. This can make the blockage worse and damage the eardrum. · If your doctor recommends that you try to remove earwax at home:  ¨ Soften and loosen the earwax with warm mineral oil. You also can try hydrogen peroxide mixed with an equal amount of room temperature water. Place 2 drops of the fluid, warmed to body temperature, in the ear two times a day for up to 5 days. ¨ Once the wax is loose and soft, all that is usually needed to remove it from the ear canal is a gentle, warm shower. Direct the water into the ear, then tip your head to let the earwax drain out. Dry your ear thoroughly with a hair dryer set on low. Hold the dryer several inches from your ear. ¨ If the warm mineral oil and shower do not work, use an over-the-counter wax softener. Read and follow all instructions on the label. After using the wax softener, use an ear syringe to gently flush the ear. Make sure the flushing solution is body temperature.  Cool or hot fluids in the ear can cause dizziness. When should you call for help? Call your doctor now or seek immediate medical care if:    · Pus or blood drains from your ear.     · Your ears are ringing or feel full.     · You have a loss of hearing.    Watch closely for changes in your health, and be sure to contact your doctor if:    · You have pain or reduced hearing after 1 week of home treatment.     · You have any new symptoms, such as nausea or balance problems. Where can you learn more? Go to http://francisco javier-nadiya.info/. Enter V420 in the search box to learn more about \"Earwax Blockage: Care Instructions. \"  Current as of: November 20, 2017  Content Version: 11.7  © 4938-8117 [employer]. Care instructions adapted under license by [employer] (which disclaims liability or warranty for this information). If you have questions about a medical condition or this instruction, always ask your healthcare professional. [address] [address] any warranty or liability for your use of this information. Please keep your follow-up appointment with Nadege Thomas MD.     Follow-up Disposition:  Return if symptoms worsen or fail to improve. Health Maintenance Due   Topic Date Due    Influenza Age 5 to Adult  08/01/2018    GLAUCOMA SCREENING Q2Y  10/01/2018       I have discussed the diagnosis with the patient and the intended plan as seen in the above orders. Patient is in agreement. The patient has received an after-visit summary and questions were answered concerning future plans. I have discussed medication side effects and warnings with the patient as well. Warning signs for the above conditions were discussed including when to call our office or go to the emergency room. The nurse provided the patient and/or family with advanced directive information if needed and encouraged the patient to provide a copy to the office when available.

## 2018-09-11 NOTE — PROGRESS NOTES
Cameron Judge  Identified pt with two pt identifiers(name and ). Chief Complaint   Patient presents with    Wax in Ear       Reviewed record In preparation for visit and have obtained necessary documentation. Advanced directive / living will on file. 1. Have you been to the ER, urgent care clinic or hospitalized since your last visit? No     2. Have you seen or consulted any other health care providers outside of the [address] [place] [address] since your last visit? Include any pap smears or colon screening. No    Vitals reviewed with provider.     Health Maintenance reviewed:     Health Maintenance Due   Topic    Influenza Age 5 to Adult     GLAUCOMA SCREENING Q2Y           Wt Readings from Last 3 Encounters:   18 109 lb (49.4 kg)   18 112 lb 6.4 oz (51 kg)   18 110 lb (49.9 kg)        Temp Readings from Last 3 Encounters:   18 98.4 °F (36.9 °C) (Oral)   18 97.8 °F (36.6 °C) (Oral)   18 98 °F (36.7 °C) (Oral)        BP Readings from Last 3 Encounters:   18 112/66   18 118/70   18 133/70        Pulse Readings from Last 3 Encounters:   18 77   18 68   18 69        Vitals:    18 1007   BP: 112/66   Pulse: 77   Resp: 16   Temp: 98.4 °F (36.9 °C)   TempSrc: Oral   SpO2: 97%   Weight: 109 lb (49.4 kg)   Height: 5' 2\" (1.575 m)   PainSc:   0 - No pain          Learning Assessment:   :       Learning Assessment 2014   PRIMARY LEARNER Patient   HIGHEST LEVEL OF EDUCATION - PRIMARY LEARNER  DID NOT GRADUATE HIGH SCHOOL   BARRIERS PRIMARY LEARNER NONE   CO-LEARNER CAREGIVER No   PRIMARY LANGUAGE ENGLISH   LEARNER PREFERENCE PRIMARY DEMONSTRATION   ANSWERED BY patient   RELATIONSHIP SELF        Depression Screening:   :       PHQ over the last two weeks 2018   Little interest or pleasure in doing things Not at all   Feeling down, depressed, irritable, or hopeless Not at all   Total Score PHQ 2 0        Fall Risk Assessment:   : Fall Risk Assessment, last 12 mths 1/31/2018   Able to walk? Yes   Fall in past 12 months? No        Abuse Screening:   :       Abuse Screening Questionnaire 11/7/2017 11/10/2016 6/6/2014   Do you ever feel afraid of your partner? N N N   Are you in a relationship with someone who physically or mentally threatens you? N N N   Is it safe for you to go home?  Y Y Y        ADL Screening:   :       ADL Assessment 11/7/2017   Feeding yourself No Help Needed   Getting from bed to chair No Help Needed   Getting dressed No Help Needed   Bathing or showering No Help Needed   Walk across the room (includes cane/walker) No Help Needed   Using the telphone No Help Needed   Taking your medications No Help Needed   Preparing meals No Help Needed   Managing money (expenses/bills) No Help Needed   Moderately strenuous housework (laundry) No Help Needed   Shopping for personal items (toiletries/medicines) No Help Needed   Shopping for groceries No Help Needed   Driving No Help Needed   Climbing a flight of stairs No Help Needed   Getting to places beyond walking distances No Help Needed

## 2018-10-31 ENCOUNTER — LAB ONLY (OUTPATIENT)
Dept: INTERNAL MEDICINE CLINIC | Facility: CLINIC | Age: 83
End: 2018-10-31

## 2018-10-31 DIAGNOSIS — E55.9 VITAMIN D DEFICIENCY: ICD-10-CM

## 2018-10-31 DIAGNOSIS — R07.9 CHEST PAIN, UNSPECIFIED TYPE: ICD-10-CM

## 2018-10-31 DIAGNOSIS — E78.00 HYPERCHOLESTEROLEMIA: ICD-10-CM

## 2018-11-01 LAB
25(OH)D3+25(OH)D2 SERPL-MCNC: 28.1 NG/ML (ref 30–100)
ALBUMIN SERPL-MCNC: 4.5 G/DL (ref 3.5–4.7)
ALBUMIN/GLOB SERPL: 2.3 {RATIO} (ref 1.2–2.2)
ALP SERPL-CCNC: 51 IU/L (ref 39–117)
ALT SERPL-CCNC: 12 IU/L (ref 0–32)
AST SERPL-CCNC: 19 IU/L (ref 0–40)
BILIRUB SERPL-MCNC: 0.5 MG/DL (ref 0–1.2)
BUN SERPL-MCNC: 17 MG/DL (ref 8–27)
BUN/CREAT SERPL: 20 (ref 12–28)
CALCIUM SERPL-MCNC: 8.7 MG/DL (ref 8.7–10.3)
CHLORIDE SERPL-SCNC: 100 MMOL/L (ref 96–106)
CHOLEST SERPL-MCNC: 176 MG/DL (ref 100–199)
CO2 SERPL-SCNC: 25 MMOL/L (ref 20–29)
CREAT SERPL-MCNC: 0.86 MG/DL (ref 0.57–1)
GLOBULIN SER CALC-MCNC: 2 G/DL (ref 1.5–4.5)
GLUCOSE SERPL-MCNC: 90 MG/DL (ref 65–99)
HDLC SERPL-MCNC: 79 MG/DL
LDLC SERPL CALC-MCNC: 85 MG/DL (ref 0–99)
POTASSIUM SERPL-SCNC: 4.2 MMOL/L (ref 3.5–5.2)
PROT SERPL-MCNC: 6.5 G/DL (ref 6–8.5)
SODIUM SERPL-SCNC: 138 MMOL/L (ref 134–144)
TRIGL SERPL-MCNC: 60 MG/DL (ref 0–149)
VLDLC SERPL CALC-MCNC: 12 MG/DL (ref 5–40)

## 2018-11-06 ENCOUNTER — OFFICE VISIT (OUTPATIENT)
Dept: INTERNAL MEDICINE CLINIC | Facility: CLINIC | Age: 83
End: 2018-11-06

## 2018-11-06 VITALS
DIASTOLIC BLOOD PRESSURE: 71 MMHG | HEART RATE: 71 BPM | BODY MASS INDEX: 20.06 KG/M2 | TEMPERATURE: 98.1 F | WEIGHT: 109 LBS | RESPIRATION RATE: 16 BRPM | SYSTOLIC BLOOD PRESSURE: 117 MMHG | HEIGHT: 62 IN | OXYGEN SATURATION: 98 %

## 2018-11-06 DIAGNOSIS — E78.00 HYPERCHOLESTEROLEMIA: Primary | ICD-10-CM

## 2018-11-06 DIAGNOSIS — R82.90 ABNORMAL URINE ODOR: ICD-10-CM

## 2018-11-06 DIAGNOSIS — E55.9 VITAMIN D DEFICIENCY: ICD-10-CM

## 2018-11-06 NOTE — PROGRESS NOTES
Amy Boss  Identified pt with two pt identifiers(name and ). Chief Complaint   Patient presents with    Cholesterol Problem     Room . Have you been to the ER, urgent care clinic since your last visit? Hospitalized since your last visit? NO    2. Have you seen or consulted any other health care providers outside of the [address] [place] [address] since your last visit? Include any pap smears or colon screening. NO      Provider notified of reason for visit, vitals and flowsheets obtained on patients. Patient received paperwork for advance directive during previous visit but has not completed at this time     Reviewed record In preparation for visit, huddled with provider and have obtained necessary documentation      Health Maintenance Due   Topic    Shingrix Vaccine Age 50> (1 of 2)    GLAUCOMA SCREENING Q2Y        Wt Readings from Last 3 Encounters:   18 109 lb (49.4 kg)   18 112 lb 6.4 oz (51 kg)   18 110 lb (49.9 kg)     Temp Readings from Last 3 Encounters:   18 98.4 °F (36.9 °C) (Oral)   18 97.8 °F (36.6 °C) (Oral)   18 98 °F (36.7 °C) (Oral)     BP Readings from Last 3 Encounters:   18 112/66   18 118/70   18 133/70     Pulse Readings from Last 3 Encounters:   18 77   18 68   18 69     There were no vitals filed for this visit.       Learning Assessment:  :     Learning Assessment 2014   PRIMARY LEARNER Patient   HIGHEST LEVEL OF EDUCATION - PRIMARY LEARNER  DID NOT GRADUATE HIGH SCHOOL   BARRIERS PRIMARY LEARNER NONE   CO-LEARNER CAREGIVER No   PRIMARY LANGUAGE ENGLISH   LEARNER PREFERENCE PRIMARY DEMONSTRATION   ANSWERED BY patient   RELATIONSHIP SELF       Depression Screening:  :     PHQ over the last two weeks 2018   Little interest or pleasure in doing things Not at all   Feeling down, depressed, irritable, or hopeless Not at all   Total Score PHQ 2 0       Fall Risk Assessment:  :     Fall Risk Assessment, last 12 mths 1/31/2018   Able to walk? Yes   Fall in past 12 months? No       Abuse Screening:  :     Abuse Screening Questionnaire 11/7/2017 11/10/2016 6/6/2014   Do you ever feel afraid of your partner? N N N   Are you in a relationship with someone who physically or mentally threatens you? N N N   Is it safe for you to go home? Y Y Y       ADL Screening:  :     ADL Assessment 11/7/2017   Feeding yourself No Help Needed   Getting from bed to chair No Help Needed   Getting dressed No Help Needed   Bathing or showering No Help Needed   Walk across the room (includes cane/walker) No Help Needed   Using the telphone No Help Needed   Taking your medications No Help Needed   Preparing meals No Help Needed   Managing money (expenses/bills) No Help Needed   Moderately strenuous housework (laundry) No Help Needed   Shopping for personal items (toiletries/medicines) No Help Needed   Shopping for groceries No Help Needed   Driving No Help Needed   Climbing a flight of stairs No Help Needed   Getting to places beyond walking distances No Help Needed         Medication reconciliation up to date and corrected with patient at this time.

## 2018-11-06 NOTE — PROGRESS NOTES
HPI  Ms. Landen Ho is a 80y.o. year old female, she is seen today for follow up high cholesterol, vit d deficiency. Notes odor to urine for about the last month. No n/v/abd pain, no dysuria, frequency. No f/c. Complains of intermittent low back pain, not new. Has intermittent swelling left lower leg with heaviness since left hip replacement. Has seen ortho for back pain and had films. Now has a hearing aid. Has been considering moving to a smaller home, still cares for her own yard. No n/v, diarrhea or constipation, no melena or brbpr. Has mild soreness LLQ since last night, not terrible. Still active, working in garden but can't do as much as in the past.  Considering down sizing to smaller home and yard. Chief Complaint   Patient presents with    Cholesterol Problem     Room 2B// has eye exam on 11/16         Prior to Admission medications    Medication Sig Start Date End Date Taking? Authorizing Provider   pravastatin (PRAVACHOL) 20 mg tablet TAKE 1 TABLET EVERY DAY  (  DOSE  LOWERED) 7/12/18  Yes Vivian Fonseca MD   Cholecalciferol, Vitamin D3, (VITAMIN D3) 2,000 unit cap capsule Take 2,000 Units by mouth daily. 11/7/17  Yes Vivian Fonseca MD   ACETAMINOPHEN (TYLENOL PO) Take  by mouth as needed. Yes Provider, Historical   VIT C/VIT E/LUTEIN/MIN/OMEGA-3 (OCUVITE PO) Take  by mouth daily. Yes Provider, Historical         Allergies   Allergen Reactions    Lipitor [Atorvastatin] Myalgia    Pravastatin Myalgia    Zetia [Ezetimibe] Other (comments)     itching         REVIEW OF SYSTEMS:  Per HPI    PHYSICAL EXAM:  Visit Vitals  /71 (BP 1 Location: Left arm, BP Patient Position: Sitting)   Pulse 71   Temp 98.1 °F (36.7 °C) (Oral)   Resp 16   Ht 5' 2\" (1.575 m)   Wt 109 lb (49.4 kg)   SpO2 98%   BMI 19.94 kg/m²     Constitutional: Appears well-developed and well-nourished. No distress. HENT:   Head: Normocephalic and atraumatic. Eyes: No scleral icterus. Cardiovascular: Normal S1/S2, regular rhythm. No murmurs, rubs, or gallops. Pulmonary/Chest: Effort normal and breath sounds normal. No respiratory distress. No wheezes, rhonchi, or rales. Abdomen: Soft, minimal tenderness llq/ND, +BS, no rebound or guarding, no masses, no HSM appreciated. Back: no cva tenderness  Ext: No edema. Neurological: Alert. Psychiatric: Normal mood and affect. Behavior is normal.     Lab Results   Component Value Date/Time    Sodium 138 10/31/2018 08:30 AM    Potassium 4.2 10/31/2018 08:30 AM    Chloride 100 10/31/2018 08:30 AM    CO2 25 10/31/2018 08:30 AM    Anion gap 5 10/12/2010 08:57 AM    Glucose 90 10/31/2018 08:30 AM    BUN 17 10/31/2018 08:30 AM    Creatinine 0.86 10/31/2018 08:30 AM    BUN/Creatinine ratio 20 10/31/2018 08:30 AM    GFR est AA 71 10/31/2018 08:30 AM    GFR est non-AA 61 10/31/2018 08:30 AM    Calcium 8.7 10/31/2018 08:30 AM    Bilirubin, total 0.5 10/31/2018 08:30 AM    AST (SGOT) 19 10/31/2018 08:30 AM    Alk. phosphatase 51 10/31/2018 08:30 AM    Protein, total 6.5 10/31/2018 08:30 AM    Albumin 4.5 10/31/2018 08:30 AM    Globulin 2.9 10/12/2010 08:57 AM    A-G Ratio 2.3 (H) 10/31/2018 08:30 AM    ALT (SGPT) 12 10/31/2018 08:30 AM     No results found for: HBA1C, HGBE8, [id number]   Lab Results   Component Value Date/Time    Cholesterol, total 176 10/31/2018 08:30 AM    HDL Cholesterol 79 10/31/2018 08:30 AM    LDL, calculated 85 10/31/2018 08:30 AM    VLDL, calculated 12 10/31/2018 08:30 AM    Triglyceride 60 10/31/2018 08:30 AM    CHOL/HDL Ratio 2.6 10/12/2010 08:57 AM          ASSESSMENT/PLAN  Diagnoses and all orders for this visit:    1. Hypercholesterolemia  -     METABOLIC PANEL, COMPREHENSIVE; Future  -     LIPID PANEL; Future  Controlled on current regimen, continue   2. Vitamin D deficiency  -     VITAMIN D, 25 HYDROXY; Future    3.  Abnormal urine odor  -     UA/M W/RFLX CULTURE, ROUTINE  R/o infection, advised increasing water intake   Other orders  -     MICROSCOPIC EXAMINATION  -     URINE CULTURE, ROUTINE          There are no preventive care reminders to display for this patient. Follow-up Disposition:  Return in about 6 months (around 5/6/2019) for chol, vit D, labs prior. Reviewed plan of care. Patient has provided input and agrees with goals. The nurse provided the patient and/or family with advanced directive information if needed and encouraged the patient to provide a copy to the office when available.

## 2018-11-09 LAB
APPEARANCE UR: CLEAR
BACTERIA #/AREA URNS HPF: NORMAL /[HPF]
BACTERIA UR CULT: NORMAL
BILIRUB UR QL STRIP: NEGATIVE
CASTS URNS QL MICRO: NORMAL /LPF
COLOR UR: YELLOW
EPI CELLS #/AREA URNS HPF: NORMAL /HPF
GLUCOSE UR QL: NEGATIVE
HGB UR QL STRIP: NEGATIVE
KETONES UR QL STRIP: NEGATIVE
LEUKOCYTE ESTERASE UR QL STRIP: NEGATIVE
MICRO URNS: ABNORMAL
MUCOUS THREADS URNS QL MICRO: PRESENT
NITRITE UR QL STRIP: POSITIVE
PH UR STRIP: 7 [PH] (ref 5–7.5)
PROT UR QL STRIP: NEGATIVE
RBC #/AREA URNS HPF: NORMAL /HPF
SP GR UR: 1.01 (ref 1–1.03)
URINALYSIS REFLEX, 377202: ABNORMAL
UROBILINOGEN UR STRIP-MCNC: 0.2 MG/DL (ref 0.2–1)
WBC #/AREA URNS HPF: NORMAL /HPF

## 2019-01-22 DIAGNOSIS — E78.00 HYPERCHOLESTEROLEMIA: ICD-10-CM

## 2019-01-22 RX ORDER — PRAVASTATIN SODIUM 20 MG/1
TABLET ORAL
Qty: 90 TAB | Refills: 4 | Status: SHIPPED | OUTPATIENT
Start: 2019-01-22 | End: 2019-05-08 | Stop reason: SDUPTHER

## 2019-01-22 NOTE — TELEPHONE ENCOUNTER
----- Message from Suzie Prather sent at 1/22/2019  3:27 PM EST -----  Regarding: Dr. David Hutton would like a refill on \"Prostatin\", her cholesterol medicine to be called into [address] (on file).  Phone: [phone]

## 2019-05-01 DIAGNOSIS — E55.9 VITAMIN D DEFICIENCY: ICD-10-CM

## 2019-05-01 DIAGNOSIS — E78.00 HYPERCHOLESTEROLEMIA: ICD-10-CM

## 2019-05-02 LAB
25(OH)D3+25(OH)D2 SERPL-MCNC: 24.5 NG/ML (ref 30–100)
ALBUMIN SERPL-MCNC: 4.2 G/DL (ref 3.5–4.7)
ALBUMIN/GLOB SERPL: 1.8 {RATIO} (ref 1.2–2.2)
ALP SERPL-CCNC: 52 IU/L (ref 39–117)
ALT SERPL-CCNC: 11 IU/L (ref 0–32)
AST SERPL-CCNC: 19 IU/L (ref 0–40)
BILIRUB SERPL-MCNC: 0.5 MG/DL (ref 0–1.2)
BUN SERPL-MCNC: 14 MG/DL (ref 8–27)
BUN/CREAT SERPL: 17 (ref 12–28)
CALCIUM SERPL-MCNC: 8.7 MG/DL (ref 8.7–10.3)
CHLORIDE SERPL-SCNC: 106 MMOL/L (ref 96–106)
CHOLEST SERPL-MCNC: 172 MG/DL (ref 100–199)
CO2 SERPL-SCNC: 25 MMOL/L (ref 20–29)
CREAT SERPL-MCNC: 0.83 MG/DL (ref 0.57–1)
GLOBULIN SER CALC-MCNC: 2.4 G/DL (ref 1.5–4.5)
GLUCOSE SERPL-MCNC: 91 MG/DL (ref 65–99)
HDLC SERPL-MCNC: 78 MG/DL
LDLC SERPL CALC-MCNC: 81 MG/DL (ref 0–99)
POTASSIUM SERPL-SCNC: 4.7 MMOL/L (ref 3.5–5.2)
PROT SERPL-MCNC: 6.6 G/DL (ref 6–8.5)
SODIUM SERPL-SCNC: 142 MMOL/L (ref 134–144)
TRIGL SERPL-MCNC: 64 MG/DL (ref 0–149)
VLDLC SERPL CALC-MCNC: 13 MG/DL (ref 5–40)

## 2019-05-08 ENCOUNTER — OFFICE VISIT (OUTPATIENT)
Dept: INTERNAL MEDICINE CLINIC | Facility: CLINIC | Age: 84
End: 2019-05-08

## 2019-05-08 VITALS
HEART RATE: 74 BPM | BODY MASS INDEX: 19.91 KG/M2 | DIASTOLIC BLOOD PRESSURE: 78 MMHG | WEIGHT: 108.2 LBS | OXYGEN SATURATION: 98 % | SYSTOLIC BLOOD PRESSURE: 124 MMHG | HEIGHT: 62 IN | RESPIRATION RATE: 14 BRPM | TEMPERATURE: 98.2 F

## 2019-05-08 DIAGNOSIS — Z63.6 CAREGIVER STRESS: ICD-10-CM

## 2019-05-08 DIAGNOSIS — Z13.31 SCREENING FOR DEPRESSION: ICD-10-CM

## 2019-05-08 DIAGNOSIS — H35.30 MACULAR DEGENERATION, UNSPECIFIED LATERALITY, UNSPECIFIED TYPE: ICD-10-CM

## 2019-05-08 DIAGNOSIS — M80.00XS OSTEOPOROSIS WITH PATHOLOGICAL FRACTURE, SEQUELA: ICD-10-CM

## 2019-05-08 DIAGNOSIS — E55.9 VITAMIN D DEFICIENCY: ICD-10-CM

## 2019-05-08 DIAGNOSIS — R93.6 ABNORMAL FINDINGS ON DIAGNOSTIC IMAGING OF LIMBS: ICD-10-CM

## 2019-05-08 DIAGNOSIS — Z71.89 ADVANCED DIRECTIVES, COUNSELING/DISCUSSION: ICD-10-CM

## 2019-05-08 DIAGNOSIS — Z00.00 MEDICARE ANNUAL WELLNESS VISIT, SUBSEQUENT: Primary | ICD-10-CM

## 2019-05-08 DIAGNOSIS — E78.00 HYPERCHOLESTEROLEMIA: ICD-10-CM

## 2019-05-08 RX ORDER — PRAVASTATIN SODIUM 20 MG/1
TABLET ORAL
Qty: 90 TAB | Refills: 4 | Status: SHIPPED | OUTPATIENT
Start: 2019-05-08 | End: 2019-05-08 | Stop reason: SDUPTHER

## 2019-05-08 RX ORDER — PRAVASTATIN SODIUM 20 MG/1
TABLET ORAL
Qty: 90 TAB | Refills: 4 | Status: SHIPPED | OUTPATIENT
Start: 2019-05-08 | End: 2019-05-21 | Stop reason: SDUPTHER

## 2019-05-08 SDOH — SOCIAL STABILITY - SOCIAL INSECURITY: DEPENDENT RELATIVE NEEDING CARE AT HOME: Z63.6

## 2019-05-08 NOTE — ACP (ADVANCE CARE PLANNING)
Advance Care Planning (ACP) Provider Conversation Snapshot    Date of ACP Conversation: 05/08/19  Persons included in Conversation:  patient  Length of ACP Conversation in minutes:  <16 minutes (Non-Billable)    Authorized Decision Maker (if patient is incapable of making informed decisions): This person is:   daughter Earl Walkre            For Patients with Decision Making Capacity:   Values/Goals: Exploration of values, goals, and preferences if recovery is not expected, even with continued medical treatment in the event of:  Imminent death  Severe, permanent brain injury  \"In these circumstances, what matters most to you? \"  Care focused more on comfort or quality of life.     Conversation Outcomes / Follow-Up Plan:   advised to bring copy

## 2019-05-08 NOTE — PROGRESS NOTES
HPI Ms. Prisca Bragg is a 80y.o. year old female, she is seen today for follow up high cholesterol, AWV, vit d deficiency. No chest pain, sob. Occasionally dizziness, occasional HA, comes and goes. Left leg swells since hip surgery- has been weaker on that leg as well since surgery. No n/v/abd pain, weight loss - usual weight 108-112. History of osteoporosis with fracture, on and off actonel and alendronate in the past, had agreed to reclast but she never went for infusion. Has been under stress - having to get bathroom remodeled. Caring for adult son who is disabled. This causes a lot of stress. Daughter in area, but caring for her 7 y/o granddaughter since her mother  7 years ago. Last week says was pulled over by  while driving, didn't hear sirens or see sirens. Says when she was really stressed - lots on her mind. Had to get fasting labs that morning, then to grocery store and [facility] and still hadn't eaten. Says  stopped her, didn't get a ticket. Told her to go to eye doctor for exam and to get hearing eval - wasn't wearing hearing aids at the time. Enjoys working in her garden - vegetables and flowers - grandson helps her. Chief Complaint Patient presents with  Cholesterol Problem Room 2B// NON fasting NON  
 Annual Wellness Visit Prior to Admission medications Medication Sig Start Date End Date Taking? Authorizing Provider  
pravastatin (PRAVACHOL) 20 mg tablet TAKE 1 TABLET EVERY DAY  (  DOSE  LOWERED) 19  Yes Loretta Haque MD  
Cholecalciferol, Vitamin D3, (VITAMIN D3) 2,000 unit cap capsule Take 2,000 Units by mouth daily. 17  Yes Loretta Haque MD  
ACETAMINOPHEN (TYLENOL PO) Take  by mouth as needed. Yes Provider, Historical  
VIT C/VIT E/LUTEIN/MIN/OMEGA-3 (OCUVITE PO) Take  by mouth daily. Yes Provider, Historical  
 
 
 
Allergies Allergen Reactions  Lipitor [Atorvastatin] Myalgia  Pravastatin Myalgia  Zetia [Ezetimibe] Other (comments)  
  itching REVIEW OF SYSTEMS: 
Per HPI PHYSICAL EXAM: 
Visit Vitals /78 Pulse 74 Temp 98.2 °F (36.8 °C) (Oral) Resp 14 Ht 5' 2\" (1.575 m) Wt 108 lb 3.2 oz (49.1 kg) SpO2 98% BMI 19.79 kg/m² Constitutional: Appears well-developed and well-nourished. No distress. HENT:  
Head: Normocephalic and atraumatic. Eyes: No scleral icterus. Neck: no lad, no tm, supple Cardiovascular: Normal S1/S2, regular rhythm. No murmurs, rubs, or gallops. Pulmonary/Chest: Effort normal and breath sounds normal. No respiratory distress. No wheezes, rhonchi, or rales. Abdomen: Soft, NT/ND, +BS, no rebound or guarding, no masses, no HSM appreciated. Back: +scoliosis and kyphosis Ext: No edema. Neurological: Alert. Psychiatric: Normal mood and affect. Behavior is normal. 
  
Lab Results Component Value Date/Time Sodium 142 05/01/2019 08:43 AM  
 Potassium 4.7 05/01/2019 08:43 AM  
 Chloride 106 05/01/2019 08:43 AM  
 CO2 25 05/01/2019 08:43 AM  
 Anion gap 5 10/12/2010 08:57 AM  
 Glucose 91 05/01/2019 08:43 AM  
 BUN 14 05/01/2019 08:43 AM  
 Creatinine 0.83 05/01/2019 08:43 AM  
 BUN/Creatinine ratio 17 05/01/2019 08:43 AM  
 GFR est AA 73 05/01/2019 08:43 AM  
 GFR est non-AA 64 05/01/2019 08:43 AM  
 Calcium 8.7 05/01/2019 08:43 AM  
 Bilirubin, total 0.5 05/01/2019 08:43 AM  
 AST (SGOT) 19 05/01/2019 08:43 AM  
 Alk. phosphatase 52 05/01/2019 08:43 AM  
 Protein, total 6.6 05/01/2019 08:43 AM  
 Albumin 4.2 05/01/2019 08:43 AM  
 Globulin 2.9 10/12/2010 08:57 AM  
 A-G Ratio 1.8 05/01/2019 08:43 AM  
 ALT (SGPT) 11 05/01/2019 08:43 AM  
 
No results found for: HBA1C, HGBE8, HHK6IIIF, [id number] Lab Results Component Value Date/Time  Cholesterol, total 172 05/01/2019 08:43 AM  
 HDL Cholesterol 78 05/01/2019 08:43 AM  
 LDL, calculated 81 05/01/2019 08:43 AM  
 VLDL, calculated 13 05/01/2019 08:43 AM  
 Triglyceride 64 05/01/2019 08:43 AM  
 CHOL/HDL Ratio 2.6 10/12/2010 08:57 AM  
  
 
 
ASSESSMENT/PLAN Diagnoses and all orders for this visit: 
 
1. Medicare annual wellness visit, subsequent 2. Osteoporosis with pathological fracture, sequela -     DEXA BONE DENSITY STUDY AXIAL; Future Is willing to get treatment - get bmd first 
3. Hypercholesterolemia -     pravastatin (PRAVACHOL) 20 mg tablet; TAKE 1 TABLET EVERY DAY  (  DOSE  LOWERED) At goal. continue current medications 4. Vitamin D deficiency Take vit d 4000iu daily for one month then resume 2000iu daily. 5. Abnormal findings on diagnostic imaging of limbs -     DEXA BONE DENSITY STUDY AXIAL; Future 6. Screening for depression 
-     [facility] 7. Advanced directives, counseling/discussion 8. Macular degeneration, unspecified laterality, unspecified type Advised getting hearing and eye exam - suspect recent traffic issue was related to low sugar, hearing and potentially vision issues - do not suspect TIA or CVA as no focal weakness, no confusion >40 min with patient >50% counseling/coordination of care regarding caregiver stress and ways to avoid, helping her with plan to get break from her duties caring for her son, checking with son's  for options for him Health Maintenance Due Topic Date Due  Pneumococcal 65+ years (2 of 2 - PPSV23) 07/06/2016  MEDICARE YEARLY EXAM  11/08/2018 Follow-up and Dispositions · Return in about 3 months (around 8/8/2019) for stress. Reviewed plan of care. Patient has provided input and agrees with goals. The nurse provided the patient and/or family with advanced directive information if needed and encouraged the patient to provide a copy to the office when available.  
   
  
 
This is the Subsequent Medicare Annual Wellness Exam, performed 12 months or more after the Initial AWV or the last Subsequent AWV 
 
 I have reviewed the patient's medical history in detail and updated the computerized patient record. History Past Medical History:  
Diagnosis Date  Arthritis  GERD (gastroesophageal reflux disease)  Hx-TIA (transient ischemic attack) 2006  Hypercholesterolemia  Osteoporosis  Other ill-defined conditions(799.89)   
 high cholesterol  S/P colonoscopy 2001 Past Surgical History:  
Procedure Laterality Date  HX ORTHOPAEDIC  2010  
 left hip replacement  [place] COLONOSCOPY FLX DX W/COLLJ SPEC WHEN PFRMD  5/25/2012  STRESS TEST CARDIAC  2007 Current Outpatient Medications Medication Sig Dispense Refill  pravastatin (PRAVACHOL) 20 mg tablet TAKE 1 TABLET EVERY DAY  (  DOSE  LOWERED) 90 Tab 4  Cholecalciferol, Vitamin D3, (VITAMIN D3) 2,000 unit cap capsule Take 2,000 Units by mouth daily. 90 Cap 4  ACETAMINOPHEN (TYLENOL PO) Take  by mouth as needed.  VIT C/VIT E/LUTEIN/MIN/OMEGA-3 (OCUVITE PO) Take  by mouth daily. Allergies Allergen Reactions  Lipitor [Atorvastatin] Myalgia  Pravastatin Myalgia  Zetia [Ezetimibe] Other (comments)  
  itching Family History Problem Relation Age of Onset  Diabetes Mother  Hypertension Mother  Stroke Mother Social History Tobacco Use  Smoking status: Never Smoker  Smokeless tobacco: Never Used Substance Use Topics  Alcohol use: No  
 
Patient Active Problem List  
Diagnosis Code  Hypercholesterolemia E78.00  
 Osteoporosis with pathological fracture M80.00XA  Hx-TIA (transient ischemic attack) Z86.73  
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  
 Macular degeneration H35.30  Vitamin D deficiency E55.9  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 08XD  Caregiver stress Z63.6 Depression Risk Factor Screening:  
 
3 most recent PHQ Screens 5/8/2019 Little interest or pleasure in doing things Not at all Feeling down, depressed, irritable, or hopeless Not at all Total Score PHQ 2 0 Alcohol Risk Factor Screening: You do not drink alcohol or very rarely. Functional Ability and Level of Safety:  
Hearing Loss The patient wears hearing aids. Activities of Daily Living The home contains: handrails and grab bars Patient does total self care Fall Risk Fall Risk Assessment, last 12 mths 5/8/2019 Able to walk? Yes Fall in past 12 months? No  
 
 
Abuse Screen Patient is not abused Cognitive Screening Evaluation of Cognitive Function: 
Has your family/caregiver stated any concerns about your memory: no 
Normal 
 
Patient Care Team  
Patient Care Team: 
Loretta Haque MD as PCP - General (Internal Medicine) Assessment/Plan Education and counseling provided: 
Are appropriate based on today's review and evaluation Diagnoses and all orders for this visit: 
 
1. Medicare annual wellness visit, subsequent 2. Osteoporosis with pathological fracture, sequela -     DEXA BONE DENSITY STUDY AXIAL; Future 3. Hypercholesterolemia -     pravastatin (PRAVACHOL) 20 mg tablet; TAKE 1 TABLET EVERY DAY  (  DOSE  LOWERED) 4. Vitamin D deficiency 5. Abnormal findings on diagnostic imaging of limbs -     DEXA BONE DENSITY STUDY AXIAL; Future 6. Screening for depression 
-     [address] 7. Advanced directives, counseling/discussion 8. Macular degeneration, unspecified laterality, unspecified type 9. Caregiver stress Health Maintenance Due Topic Date Due  Pneumococcal 65+ years (2 of 2 - PPSV23) 07/06/2016  MEDICARE YEARLY EXAM  11/08/2018

## 2019-05-08 NOTE — PATIENT INSTRUCTIONS
Take vit d 4000iu daily for one month then resume 2000iu daily. Medicare Wellness Visit, Female The best way to live healthy is to have a lifestyle where you eat a well-balanced diet, exercise regularly, limit alcohol use, and quit all forms of tobacco/nicotine, if applicable. Regular preventive services are another way to keep healthy. Preventive services (vaccines, screening tests, monitoring & exams) can help personalize your care plan, which helps you manage your own care. Screening tests can find health problems at the earliest stages, when they are easiest to treat. [facility] follows the current, evidence-based guidelines published by the [facility] Emiliano Geeta (USPSTF) when recommending preventive services for our patients. Because we follow these guidelines, sometimes recommendations change over time as research supports it. (For example, mammograms used to be recommended annually. Even though Medicare will still pay for an annual mammogram, the newer guidelines recommend a mammogram every two years for women of average risk.) Of course, you and your doctor may decide to screen more often for some diseases, based on your risk and your health status. Preventive services for you include: - Medicare offers their members a free annual wellness visit, which is time for you and your primary care provider to discuss and plan for your preventive service needs. Take advantage of this benefit every year! 
-All adults over the age of 72 should receive the recommended pneumonia vaccines. Current USPSTF guidelines recommend a series of two vaccines for the best pneumonia protection.  
-All adults should have a flu vaccine yearly and a tetanus vaccine every 10 years.  All adults age 61 and older should receive a shingles vaccine once in their lifetime.   
-A bone mass density test is recommended when a woman turns 65 to screen for osteoporosis. This test is only recommended one time, as a screening. Some providers will use this same test as a disease monitoring tool if you already have osteoporosis. -All adults age 38-68 who are overweight should have a diabetes screening test once every three years.  
-Other screening tests and preventive services for persons with diabetes include: an eye exam to screen for diabetic retinopathy, a kidney function test, a foot exam, and stricter control over your cholesterol.  
-Cardiovascular screening for adults with routine risk involves an electrocardiogram (ECG) at intervals determined by your doctor.  
-Colorectal cancer screenings should be done for adults age 54-65 with no increased risk factors for colorectal cancer. There are a number of acceptable methods of screening for this type of cancer. Each test has its own benefits and drawbacks. Discuss with your doctor what is most appropriate for you during your annual wellness visit. The different tests include: colonoscopy (considered the best screening method), a fecal occult blood test, a fecal DNA test, and sigmoidoscopy. -Breast cancer screenings are recommended every other year for women of normal risk, age 54-69. 
-Cervical cancer screenings for women over age 72 are only recommended with certain risk factors.  
-All adults born between [facility] should be screened once for Hepatitis C. Here is a list of your current Health Maintenance items (your personalized list of preventive services) with a due date: 
Health Maintenance Due Topic Date Due  Pneumococcal Vaccine (2 of 2 - PPSV23) 07/06/2016 [address] [place] [address] Annual Well Visit  11/08/2018

## 2019-05-08 NOTE — PROGRESS NOTES
Noam Rose Chief Complaint Patient presents with  Cholesterol Problem Room 2B// NON fasting NON Reviewed record In preparation for visit and have obtained necessary documentation 1. Have you been to the ER, urgent care clinic since your last visit? Hospitalized since your last visit? NO 
2. Have you seen or consulted any other health care providers outside of the [address] [place] [address] since your last visit? Include any pap smears or colon screening. NO Patient has advance directive on file Provider advised of reason for visit and vitals Health Maintenance Due Topic  Pneumococcal 65+ years (2 of 2 - PPSV23)  MEDICARE YEARLY EXAM   
 
 
Wt Readings from Last 3 Encounters:  
05/08/19 108 lb 3.2 oz (49.1 kg) 11/06/18 109 lb (49.4 kg) 09/11/18 109 lb (49.4 kg) Temp Readings from Last 3 Encounters:  
11/06/18 98.1 °F (36.7 °C) (Oral) 09/11/18 98.4 °F (36.9 °C) (Oral) 05/07/18 97.8 °F (36.6 °C) (Oral) BP Readings from Last 3 Encounters:  
11/06/18 117/71  
09/11/18 112/66  
05/07/18 118/70 Pulse Readings from Last 3 Encounters:  
11/06/18 71  
09/11/18 77  
05/07/18 68 Learning Assessment: 
:  
 
Learning Assessment 6/6/2014 PRIMARY LEARNER Patient HIGHEST LEVEL OF EDUCATION - PRIMARY LEARNER  DID NOT GRADUATE HIGH SCHOOL  
BARRIERS PRIMARY LEARNER NONE  
CO-LEARNER CAREGIVER No  
PRIMARY LANGUAGE ENGLISH  
LEARNER PREFERENCE PRIMARY DEMONSTRATION  
ANSWERED BY patient RELATIONSHIP SELF Depression Screening: 
:  
 
3 most recent PHQ Screens 5/8/2019 Little interest or pleasure in doing things Not at all Feeling down, depressed, irritable, or hopeless Not at all Total Score PHQ 2 0 Fall Risk Assessment: 
:  
 
Fall Risk Assessment, last 12 mths 5/8/2019 Able to walk? Yes Fall in past 12 months? No  
 
 
Abuse Screening: 
:  
 
Abuse Screening Questionnaire 5/8/2019 11/7/2017 11/10/2016 6/6/2014 Do you ever feel afraid of your partner? N N N N Are you in a relationship with someone who physically or mentally threatens you? N N N N Is it safe for you to go home? Y Y Y Y  
 
 
ADL Screening: 
:  
 
ADL Assessment 11/7/2017 Feeding yourself No Help Needed Getting from bed to chair No Help Needed Getting dressed No Help Needed Bathing or showering No Help Needed Walk across the room (includes cane/walker) No Help Needed Using the telphone No Help Needed Taking your medications No Help Needed Preparing meals No Help Needed Managing money (expenses/bills) No Help Needed Moderately strenuous housework (laundry) No Help Needed Shopping for personal items (toiletries/medicines) No Help Needed Shopping for groceries No Help Needed Driving No Help Needed Climbing a flight of stairs No Help Needed Getting to places beyond walking distances No Help Needed Medication reconciliation up to date and corrected with patient at this time.

## 2019-05-18 ENCOUNTER — APPOINTMENT (OUTPATIENT)
Dept: GENERAL RADIOLOGY | Age: 84
End: 2019-05-18
Attending: EMERGENCY MEDICINE
Payer: MEDICARE

## 2019-05-18 ENCOUNTER — HOSPITAL ENCOUNTER (EMERGENCY)
Age: 84
Discharge: HOME | End: 2019-05-18
Attending: EMERGENCY MEDICINE
Payer: MEDICARE

## 2019-05-18 ENCOUNTER — APPOINTMENT (OUTPATIENT)
Dept: CT IMAGING | Age: 84
End: 2019-05-18
Attending: EMERGENCY MEDICINE
Payer: MEDICARE

## 2019-05-18 VITALS
HEIGHT: 62 IN | SYSTOLIC BLOOD PRESSURE: 122 MMHG | HEART RATE: 75 BPM | TEMPERATURE: 98 F | WEIGHT: 108.25 LBS | OXYGEN SATURATION: 99 % | RESPIRATION RATE: 20 BRPM | BODY MASS INDEX: 19.92 KG/M2 | DIASTOLIC BLOOD PRESSURE: 67 MMHG

## 2019-05-18 DIAGNOSIS — R42 VERTIGO: Primary | ICD-10-CM

## 2019-05-18 LAB
ALBUMIN SERPL-MCNC: 4 G/DL (ref 3.5–5)
ALBUMIN/GLOB SERPL: 1.3 {RATIO} (ref 1.1–2.2)
ALP SERPL-CCNC: 55 U/L (ref 45–117)
ALT SERPL-CCNC: 19 U/L (ref 12–78)
ANION GAP SERPL CALC-SCNC: 6 MMOL/L (ref 5–15)
APPEARANCE UR: CLEAR
AST SERPL-CCNC: 20 U/L (ref 15–37)
BACTERIA URNS QL MICRO: ABNORMAL /HPF
BASOPHILS # BLD: 0 K/UL (ref 0–0.1)
BASOPHILS NFR BLD: 0 % (ref 0–1)
BILIRUB SERPL-MCNC: 0.6 MG/DL (ref 0.2–1)
BILIRUB UR QL: NEGATIVE
BUN SERPL-MCNC: 16 MG/DL (ref 6–20)
BUN/CREAT SERPL: 20 (ref 12–20)
CALCIUM SERPL-MCNC: 8.4 MG/DL (ref 8.5–10.1)
CHLORIDE SERPL-SCNC: 105 MMOL/L (ref 97–108)
CO2 SERPL-SCNC: 28 MMOL/L (ref 21–32)
COLOR UR: ABNORMAL
CREAT SERPL-MCNC: 0.82 MG/DL (ref 0.55–1.02)
DIFFERENTIAL METHOD BLD: ABNORMAL
EOSINOPHIL # BLD: 0 K/UL (ref 0–0.4)
EOSINOPHIL NFR BLD: 0 % (ref 0–7)
EPITH CASTS URNS QL MICRO: ABNORMAL /LPF
ERYTHROCYTE [DISTWIDTH] IN BLOOD BY AUTOMATED COUNT: 13.5 % (ref 11.5–14.5)
GLOBULIN SER CALC-MCNC: 3.2 G/DL (ref 2–4)
GLUCOSE SERPL-MCNC: 130 MG/DL (ref 65–100)
GLUCOSE UR STRIP.AUTO-MCNC: NEGATIVE MG/DL
HCT VFR BLD AUTO: 37.8 % (ref 35–47)
HGB BLD-MCNC: 12.1 G/DL (ref 11.5–16)
HGB UR QL STRIP: NEGATIVE
IMM GRANULOCYTES # BLD AUTO: 0 K/UL (ref 0–0.04)
IMM GRANULOCYTES NFR BLD AUTO: 0 % (ref 0–0.5)
KETONES UR QL STRIP.AUTO: 40 MG/DL
LEUKOCYTE ESTERASE UR QL STRIP.AUTO: ABNORMAL
LYMPHOCYTES # BLD: 0.7 K/UL (ref 0.8–3.5)
LYMPHOCYTES NFR BLD: 12 % (ref 12–49)
MCH RBC QN AUTO: 29.5 PG (ref 26–34)
MCHC RBC AUTO-ENTMCNC: 32 G/DL (ref 30–36.5)
MCV RBC AUTO: 92.2 FL (ref 80–99)
MONOCYTES # BLD: 0.3 K/UL (ref 0–1)
MONOCYTES NFR BLD: 5 % (ref 5–13)
NEUTS SEG # BLD: 5.2 K/UL (ref 1.8–8)
NEUTS SEG NFR BLD: 83 % (ref 32–75)
NITRITE UR QL STRIP.AUTO: POSITIVE
NRBC # BLD: 0 K/UL (ref 0–0.01)
NRBC BLD-RTO: 0 PER 100 WBC
PH UR STRIP: 7 [PH] (ref 5–8)
PLATELET # BLD AUTO: 154 K/UL (ref 150–400)
PMV BLD AUTO: 9.8 FL (ref 8.9–12.9)
POTASSIUM SERPL-SCNC: 4 MMOL/L (ref 3.5–5.1)
PROT SERPL-MCNC: 7.2 G/DL (ref 6.4–8.2)
PROT UR STRIP-MCNC: NEGATIVE MG/DL
RBC # BLD AUTO: 4.1 M/UL (ref 3.8–5.2)
RBC #/AREA URNS HPF: ABNORMAL /HPF (ref 0–5)
RBC MORPH BLD: ABNORMAL
SODIUM SERPL-SCNC: 139 MMOL/L (ref 136–145)
SP GR UR REFRACTOMETRY: 1.02 (ref 1–1.03)
TROPONIN I SERPL-MCNC: <0.05 NG/ML
UA: UC IF INDICATED,UAUC: ABNORMAL
UROBILINOGEN UR QL STRIP.AUTO: 0.2 EU/DL (ref 0.2–1)
WBC # BLD AUTO: 6.2 K/UL (ref 3.6–11)
WBC URNS QL MICRO: ABNORMAL /HPF (ref 0–4)

## 2019-05-18 PROCEDURE — 99285 EMERGENCY DEPT VISIT HI MDM: CPT

## 2019-05-18 PROCEDURE — 71045 X-RAY EXAM CHEST 1 VIEW: CPT

## 2019-05-18 PROCEDURE — 74011250636 HC RX REV CODE- 250/636: Performed by: EMERGENCY MEDICINE

## 2019-05-18 PROCEDURE — 81001 URINALYSIS AUTO W/SCOPE: CPT

## 2019-05-18 PROCEDURE — 93005 ELECTROCARDIOGRAM TRACING: CPT

## 2019-05-18 PROCEDURE — 74011250636 HC RX REV CODE- 250/636

## 2019-05-18 PROCEDURE — 74011250637 HC RX REV CODE- 250/637: Performed by: EMERGENCY MEDICINE

## 2019-05-18 PROCEDURE — 36415 COLL VENOUS BLD VENIPUNCTURE: CPT

## 2019-05-18 PROCEDURE — 85025 COMPLETE CBC W/AUTO DIFF WBC: CPT

## 2019-05-18 PROCEDURE — 96361 HYDRATE IV INFUSION ADD-ON: CPT

## 2019-05-18 PROCEDURE — 87077 CULTURE AEROBIC IDENTIFY: CPT

## 2019-05-18 PROCEDURE — 70450 CT HEAD/BRAIN W/O DYE: CPT

## 2019-05-18 PROCEDURE — 87186 SC STD MICRODIL/AGAR DIL: CPT

## 2019-05-18 PROCEDURE — 80053 COMPREHEN METABOLIC PANEL: CPT

## 2019-05-18 PROCEDURE — 87086 URINE CULTURE/COLONY COUNT: CPT

## 2019-05-18 PROCEDURE — 96374 THER/PROPH/DIAG INJ IV PUSH: CPT

## 2019-05-18 PROCEDURE — 84484 ASSAY OF TROPONIN QUANT: CPT

## 2019-05-18 RX ORDER — ONDANSETRON 2 MG/ML
4 INJECTION INTRAMUSCULAR; INTRAVENOUS
Status: COMPLETED | OUTPATIENT
Start: 2019-05-18 | End: 2019-05-18

## 2019-05-18 RX ORDER — MECLIZINE HCL 12.5 MG 12.5 MG/1
12.5 TABLET ORAL
Status: COMPLETED | OUTPATIENT
Start: 2019-05-18 | End: 2019-05-18

## 2019-05-18 RX ORDER — ACETAMINOPHEN 500 MG
1000 TABLET ORAL ONCE
Status: COMPLETED | OUTPATIENT
Start: 2019-05-18 | End: 2019-05-18

## 2019-05-18 RX ORDER — MECLIZINE HYDROCHLORIDE 25 MG/1
25 TABLET ORAL
Qty: 20 TAB | Refills: 0 | Status: SHIPPED | OUTPATIENT
Start: 2019-05-18 | End: 2020-01-23

## 2019-05-18 RX ORDER — ONDANSETRON 2 MG/ML
INJECTION INTRAMUSCULAR; INTRAVENOUS
Status: COMPLETED
Start: 2019-05-18 | End: 2019-05-18

## 2019-05-18 RX ORDER — MECLIZINE HYDROCHLORIDE 25 MG/1
25 TABLET ORAL
Qty: 20 TAB | Refills: 0 | Status: SHIPPED | OUTPATIENT
Start: 2019-05-18 | End: 2019-05-18

## 2019-05-18 RX ADMIN — ONDANSETRON 4 MG: 2 INJECTION INTRAMUSCULAR; INTRAVENOUS at 15:28

## 2019-05-18 RX ADMIN — MECLIZINE 12.5 MG: 12.5 TABLET ORAL at 15:28

## 2019-05-18 RX ADMIN — SODIUM CHLORIDE 500 ML: 900 INJECTION, SOLUTION INTRAVENOUS at 15:28

## 2019-05-18 RX ADMIN — ACETAMINOPHEN 1000 MG: 500 TABLET ORAL at 15:28

## 2019-05-18 NOTE — ED PROVIDER NOTES
EMERGENCY DEPARTMENT HISTORY AND PHYSICAL EXAM 
 
 
Date: 5/18/2019 Patient Name: Yamilex Atkinson History of Presenting Illness Chief Complaint Patient presents with  Dizziness Patient complain of dizziness and nausea, vomiting History Provided By: Patient and Family friend HPI: Yamilex Atkinson, 80 y.o. female with PMHx significant for osteoporosis and TIA, presents to the ED with cc of moderate headache and dizziness over the last 3 to 4 hours. She also has had significant nausea and vomiting during this time. Patient describes some intermittent visual abnormalities and disturbances throughout the week when she was at her eye doctor's. Her dizziness is worse with movement of the head and better at rest.  She is having significant difficulty walking. Headache was gradual in onset, diffuse in location, and pressure-like in quality. Family friend reports the patient has had increasing headaches over the last several months because of increasing stress she has caring for her disabled son. There is no other associated symptoms. No other exacerbating or ameliorating factors. There are no other complaints, changes, or physical findings at this time. PCP: Deb Boswell MD 
 
No current facility-administered medications on file prior to encounter. Current Outpatient Medications on File Prior to Encounter Medication Sig Dispense Refill  pravastatin (PRAVACHOL) 20 mg tablet TAKE 1 TABLET EVERY DAY  (  DOSE  LOWERED) 90 Tab 4  Cholecalciferol, Vitamin D3, (VITAMIN D3) 2,000 unit cap capsule Take 2,000 Units by mouth daily. 90 Cap 4  ACETAMINOPHEN (TYLENOL PO) Take  by mouth as needed.  VIT C/VIT E/LUTEIN/MIN/OMEGA-3 (OCUVITE PO) Take  by mouth daily. Past History Past Medical History: 
Past Medical History:  
Diagnosis Date  Arthritis  GERD (gastroesophageal reflux disease)  Hx-TIA (transient ischemic attack) 2006  Hypercholesterolemia  Osteoporosis  Other ill-defined conditions(799.89)   
 high cholesterol  S/P colonoscopy 2001 Past Surgical History: 
Past Surgical History:  
Procedure Laterality Date  HX ORTHOPAEDIC  2010  
 left hip replacement  [place] COLONOSCOPY FLX DX W/COLLJ SPEC WHEN PFRMD  5/25/2012  STRESS TEST CARDIAC  2007 Family History: 
Family History Problem Relation Age of Onset  Diabetes Mother  Hypertension Mother  Stroke Mother Social History: 
Social History Tobacco Use  Smoking status: Never Smoker  Smokeless tobacco: Never Used Substance Use Topics  Alcohol use: No  
 Drug use: No  
 
 
Allergies: Allergies Allergen Reactions  Lipitor [Atorvastatin] Myalgia  Pravastatin Myalgia  Zetia [Ezetimibe] Other (comments)  
  itching Review of Systems Review of Systems Constitutional: Positive for fatigue. Negative for chills, diaphoresis and fever. HENT: Negative for ear pain and sore throat. Eyes: Negative for pain and redness. Respiratory: Negative for cough and shortness of breath. Cardiovascular: Negative for chest pain and leg swelling. Gastrointestinal: Negative for abdominal pain, diarrhea, nausea and vomiting. Endocrine: Negative for cold intolerance and heat intolerance. Genitourinary: Negative for flank pain and hematuria. Musculoskeletal: Negative for back pain and neck stiffness. Skin: Negative for rash and wound. Neurological: Positive for dizziness, weakness, light-headedness and headaches. Negative for syncope. All other systems reviewed and are negative. Physical Exam  
Physical Exam  
Constitutional: She is oriented to person, place, and time. She appears well-developed and well-nourished. Patient is an elderly female who appears in no acute distress. HENT:  
Head: Normocephalic and atraumatic. Mouth/Throat: Oropharynx is clear and moist. No oropharyngeal exudate. Eyes: Pupils are equal, round, and reactive to light. Conjunctivae and EOM are normal.  
Neck: Normal range of motion. Cardiovascular: Normal rate and regular rhythm. No murmur heard. Pulmonary/Chest: Effort normal and breath sounds normal. No respiratory distress. She has no wheezes. Abdominal: Soft. Bowel sounds are normal. She exhibits no distension. There is no tenderness. Musculoskeletal: Normal range of motion. She exhibits no edema or deformity. Neurological: She is alert and oriented to person, place, and time. Coordination normal.  
Patient is alert and oriented x3. Cranial nerves II through XII are intact. Extraocular eye movements are intact but she has unidirectional horizontal nystagmus to the right. This nystagmus is fatigable. She is a negative test of skew and her head impulse testing shows catch-up saccades. She has no facial droop, pronator drift, or any weakness in the lower extremity's. She has no unilateral sensory deficits. She has an ataxic wide-based gait. Skin: Skin is warm and dry. No rash noted. Psychiatric: She has a normal mood and affect. Her behavior is normal.  
Nursing note and vitals reviewed. Diagnostic Study Results Labs - No results found for this or any previous visit (from the past 24 hour(s)). Radiologic Studies -  
CT HEAD WO CONT Final Result IMPRESSION:   
1. Moderate severe white matter changes which have progressed in the interval.  
These are nonspecific but can be seen with small vessel ischemic change XR CHEST PORT Final Result IMPRESSION: No acute cardiopulmonary disease. CT Results  (Last 48 hours) 05/18/19 1626  CT HEAD WO CONT Final result Impression:  IMPRESSION:   
1. Moderate severe white matter changes which have progressed in the interval.  
These are nonspecific but can be seen with small vessel ischemic change Narrative:  EXAM: CT HEAD WO CONT INDICATION: dizziness COMPARISON: 5/22/2008. CONTRAST: None. TECHNIQUE: Unenhanced CT of the head was performed using 5 mm images. Brain and  
bone windows were generated. CT dose reduction was achieved through use of a  
standardized protocol tailored for this examination and automatic exposure  
control for dose modulation. FINDINGS:  
The ventricles and sulci are normal in size, shape and configuration and  
midline. Moderate to severe low density within the periventricular white matter  
which has progressed. There is no intracranial hemorrhage, extra-axial  
collection, mass, mass effect or midline shift. The basilar cisterns are open. No acute infarct is identified. The bone windows demonstrate no abnormalities. The visualized portions of the paranasal sinuses and mastoid air cells are  
clear. CXR Results  (Last 48 hours) 05/18/19 1526  XR CHEST PORT Final result Impression:  IMPRESSION: No acute cardiopulmonary disease. Narrative:  INDICATION: Dizziness. Portable AP upright view of the chest.  
   
Direct comparison made to prior chest x-ray dated February 7, 2018. Cardiomediastinal silhouette is stable. Lungs are clear bilaterally. Pleural  
spaces are normal. Osseous structures are intact. Medical Decision Making I am the first provider for this patient. I reviewed the vital signs, available nursing notes, past medical history, past surgical history, family history and social history. Vital Signs-Reviewed the patient's vital signs. No data found. Pulse Oximetry Analysis -97 % on room air Cardiac Monitor:  
Rate: 80 bpm 
Rhythm: Normal Sinus Rhythm Records Reviewed: Nursing Notes Differential Diagnosis: 
 
Patient presenting with generalized fatigue/weakness. Stable vitals and nontoxic appearing.   DDx: infection, anemia, electrolyte anomoly (hypo or hyperkalemia, hypomagnesemia), hypothyroid, dehydration, depression, CA, ACS. Will obtain EKG, UA, labwork for any urgent/emergent pathology. Will reassess and monitor while in ED. Provider Notes (Medical Decision Making):  
Patient feeling much better after treatment in the emergency department. At this time patient's vertigo appears to be peripheral in nature and I felt fine no findings suggestive of stroke on my exam.  Her symptoms are highly positional and associated with a sensation of ear fullness which leads me to believe this is a peripheral cause of her vertigo. Her neurologic exam is also supportive of this. She will follow-up very closely with her primary care doctor and will return if she develops any signs of stroke. ED Course:  
 
Initial assessment performed. The patients presenting problems have been discussed, and they are in agreement with the care plan formulated and outlined with them. I have encouraged them to ask questions as they arise throughout their visit. Critical Care Time:  
 
None Disposition: 
2:19 PM 
Devora Merrill's  results have been reviewed with her. She has been counseled regarding her diagnosis. She verbally conveys understanding and agreement of the signs, symptoms, diagnosis, treatment and prognosis and additionally agrees to follow up as recommended with Dr. Chet Gruber MD in 24 - 48 hours. She also agrees with the care-plan and conveys that all of her questions have been answered. I have also put together some discharge instructions for her that include: 1) educational information regarding their diagnosis, 2) how to care for their diagnosis at home, as well a 3) list of reasons why they would want to return to the ED prior to their follow-up appointment, should their condition change. PLAN: 
1. Discharge Medication List as of 5/18/2019  6:03 PM  
  
START taking these medications Details meclizine (ANTIVERT) 25 mg tablet Take 1 Tab by mouth three (3) times daily as needed for Dizziness., Normal, Disp-20 Tab, R-0  
  
  
CONTINUE these medications which have NOT CHANGED Details  
pravastatin (PRAVACHOL) 20 mg tablet TAKE 1 TABLET EVERY DAY  (  DOSE  LOWERED), Normal, Disp-90 Tab, R-4 Cholecalciferol, Vitamin D3, (VITAMIN D3) 2,000 unit cap capsule Take 2,000 Units by mouth daily. , Normal, Disp-90 Cap, R-4  
  
ACETAMINOPHEN (TYLENOL PO) Take  by mouth as needed., Historical Med  
  
VIT C/VIT E/LUTEIN/MIN/OMEGA-3 (OCUVITE PO) Take  by mouth daily. , Historical Med 2. Follow-up Information Follow up With Specialties Details Why Contact Info Katie Lama MD Internal Medicine In 3 days  [address] [place] [id number] [phone] Return to ED if worse Diagnosis Clinical Impression: 1. Vertigo

## 2019-05-18 NOTE — ED NOTES
Pt discharged by Dr. Guerline Jones. Pt provided with discharge instructions Rx and instructions on follow up care. Pt out of ED in stable condition accompanied by family.

## 2019-05-18 NOTE — ED NOTES
Pt still reports nausea/vomiting with movement as well as ear pain. Dr. Justa Gardner made aware and will come reevaluate patient. Orthostatics will be held at this tme

## 2019-05-18 NOTE — DISCHARGE INSTRUCTIONS
Patient Education        Dizziness: Care Instructions  Your Care Instructions  Dizziness is the feeling of unsteadiness or fuzziness in your head. It is different than having vertigo, which is a feeling that the room is spinning or that you are moving or falling. It is also different from lightheadedness, which is the feeling that you are about to faint. It can be hard to know what causes dizziness. Some people feel dizzy when they have migraine headaches. Sometimes bouts of flu can make you feel dizzy. Some medical conditions, such as heart problems or high blood pressure, can make you feel dizzy. Many medicines can cause dizziness, including medicines for high blood pressure, pain, or anxiety. If a medicine causes your symptoms, your doctor may recommend that you stop or change the medicine. If it is a problem with your heart, you may need medicine to help your heart work better. If there is no clear reason for your symptoms, your doctor may suggest watching and waiting for a while to see if the dizziness goes away on its own. Follow-up care is a key part of your treatment and safety. Be sure to make and go to all appointments, and call your doctor if you are having problems. It's also a good idea to know your test results and keep a list of the medicines you take. How can you care for yourself at home? · If your doctor recommends or prescribes medicine, take it exactly as directed. Call your doctor if you think you are having a problem with your medicine. · Do not drive while you feel dizzy. · Try to prevent falls. Steps you can take include:  ? Using nonskid mats, adding grab bars near the tub, and using night-lights. ? Clearing your home so that walkways are free of anything you might trip on.  ? Letting family and friends know that you have been feeling dizzy. This will help them know how to help you. When should you call for help? Call 911 anytime you think you may need emergency care.  For example, call if:    · You passed out (lost consciousness).     · You have dizziness along with symptoms of a heart attack. These may include:  ? Chest pain or pressure, or a strange feeling in the chest.  ? Sweating. ? Shortness of breath. ? Nausea or vomiting. ? Pain, pressure, or a strange feeling in the back, neck, jaw, or upper belly or in one or both shoulders or arms. ? Lightheadedness or sudden weakness. ? A fast or irregular heartbeat.     · You have symptoms of a stroke. These may include:  ? Sudden numbness, tingling, weakness, or loss of movement in your face, arm, or leg, especially on only one side of your body. ? Sudden vision changes. ? Sudden trouble speaking. ? Sudden confusion or trouble understanding simple statements. ? Sudden problems with walking or balance. ? A sudden, severe headache that is different from past headaches.    Call your doctor now or seek immediate medical care if:    · You feel dizzy and have a fever, headache, or ringing in your ears.     · You have new or increased nausea and vomiting.     · Your dizziness does not go away or comes back.    Watch closely for changes in your health, and be sure to contact your doctor if:    · You do not get better as expected. Where can you learn more? Go to http://francisco javier-nadiya.info/. Enter V445 in the search box to learn more about \"Dizziness: Care Instructions. \"  Current as of: September 23, 2018  Content Version: 11.9  © 4823-5765 [employer]. Care instructions adapted under license by [employer] (which disclaims liability or warranty for this information). If you have questions about a medical condition or this instruction, always ask your healthcare professional. [place] [address] any warranty or liability for your use of this information.          Patient Education        Epley Maneuver at Home for Vertigo: Exercises  Your Care Instructions  Vertigo is a spinning or whirling sensation when you move your head. Your doctor may have moved you in different positions to help your vertigo get better faster. This is called the Epley maneuver. Your doctor also may have asked you to do these exercises at home. Do the exercises as often as your doctor recommends. If your vertigo is getting worse, your doctor may have you change the exercise or stop it. Step 1  Step 1    1. Sit on the edge of a bed or sofa. Step 2    1. Turn your head 45 degrees in the direction your doctor told you to. This should be toward the ear that causes the most vertigo for you. In this picture, the woman is turning toward her left ear. Step 3    1. Tilt yourself backward until you are lying on your back. Your head should still be at a 45-degree turn. Your head should be about midway between looking straight ahead and looking out to your side. Hold for 30 seconds. If you have vertigo, stay in this position until it stops. Step 4    1. Turn your head 90 degrees toward the ear that has the least vertigo. In this picture, the woman is turning to the right because she has vertigo on her left side. The point of your chin should be raised and over your shoulder. Hold for 30 seconds. Step 5    1. Roll onto the side with the least vertigo. You should now be looking at the floor. Hold for 30 seconds. Follow-up care is a key part of your treatment and safety. Be sure to make and go to all appointments, and call your doctor if you are having problems. It's also a good idea to know your test results and keep a list of the medicines you take. Where can you learn more? Go to http://francisco javier-nadiya.info/. Enter 470 [id number] in the search box to learn more about \"Epley Maneuver at Home for Vertigo: Exercises. \"  Current as of: Candel[place] 3, 2018  Content Version: 11.9  © 6135-6131 [employer].  Care instructions adapted under license by [employer] (which disclaims liability or warranty for this information). If you have questions about a medical condition or this instruction, always ask your healthcare professional. [place] [address] any warranty or liability for your use of this information. Patient Education        Vertigo: Care Instructions  Your Care Instructions    Vertigo is the feeling that you or your surroundings are moving when there is no actual movement. It is often described as a feeling of spinning, whirling, falling, or tilting. Vertigo may make you vomit or feel nauseated. You may have trouble standing or walking and may lose your balance. Vertigo is often related to an inner ear problem, but it can have other more serious causes. If vertigo continues, you may need more tests to find its cause. Follow-up care is a key part of your treatment and safety. Be sure to make and go to all appointments, and call your doctor if you are having problems. It's also a good idea to know your test results and keep a list of the medicines you take. How can you care for yourself at home? · Do not lie flat on your back. Prop yourself up slightly. This may reduce the spinning feeling. Keep your eyes open. · Move slowly so that you do not fall. · If your doctor recommends medicine, take it exactly as directed. · Do not drive while you are having vertigo. Certain exercises, called Howell-Daroff exercises, can help decrease vertigo. To do Howell-Daroff exercises:  · Sit on the edge of a bed or sofa and quickly lie down on the side that causes the worst vertigo. Lie on your side with your ear down. · Stay in this position for at least 30 seconds or until the vertigo goes away. · Sit up. If this causes vertigo, wait for it to stop. · Repeat the procedure on the other side. · Repeat this 10 times. Do these exercises 2 times a day until the vertigo is gone. When should you call for help? Call 911 anytime you think you may need emergency care.  For example, call if:    · You passed out (lost consciousness).     · You have symptoms of a stroke. These may include:  ? Sudden numbness, tingling, weakness, or loss of movement in your face, arm, or leg, especially on only one side of your body. ? Sudden vision changes. ? Sudden trouble speaking. ? Sudden confusion or trouble understanding simple statements. ? Sudden problems with walking or balance. ? A sudden, severe headache that is different from past headaches.    Call your doctor now or seek immediate medical care if:    · Vertigo occurs with a fever, a headache, or ringing in your ears.     · You have new or increased nausea and vomiting.    Watch closely for changes in your health, and be sure to contact your doctor if:    · Vertigo gets worse or happens more often.     · Vertigo has not gotten better after 2 weeks. Where can you learn more? Go to http://francisco javier-nadiya.info/. Enter V275 in the search box to learn more about \"Vertigo: Care Instructions. \"  Current as of: March 27, 2018  Content Version: 11.9  © 4443-2516 [employer]. Care instructions adapted under license by [employer] (which disclaims liability or warranty for this information). If you have questions about a medical condition or this instruction, always ask your healthcare professional. [address] any warranty or liability for your use of this information.

## 2019-05-18 NOTE — ED NOTES
Assumed care of patient at this time. Pt reports dizziness, nausea and vomiting that started this morning. History of similar symptoms in the past. Describes dizziness as feeling like her head is full and the room is spinning. Pt normally walks independently but had to use a cane today d/t feeling unsteady. Patient alert and oriented and on the monitor x3 with family at bedside

## 2019-05-19 LAB
ATRIAL RATE: 86 BPM
CALCULATED P AXIS, ECG09: 1 DEGREES
CALCULATED R AXIS, ECG10: 12 DEGREES
CALCULATED T AXIS, ECG11: 17 DEGREES
DIAGNOSIS, 93000: NORMAL
P-R INTERVAL, ECG05: 170 MS
Q-T INTERVAL, ECG07: 388 MS
QRS DURATION, ECG06: 82 MS
QTC CALCULATION (BEZET), ECG08: 464 MS
VENTRICULAR RATE, ECG03: 86 BPM

## 2019-05-19 RX ORDER — CEPHALEXIN 500 MG/1
500 CAPSULE ORAL 2 TIMES DAILY
Qty: 14 CAP | Refills: 0 | Status: SHIPPED | OUTPATIENT
Start: 2019-05-19 | End: 2019-05-21 | Stop reason: ALTCHOICE

## 2019-05-19 NOTE — PROGRESS NOTES
Patient was not treated for her UTI while in the ED. I tried to leave a voicemail but voicemail was full.   I sent Keflex 500 mg twice daily to her pharmacy

## 2019-05-20 ENCOUNTER — TELEPHONE (OUTPATIENT)
Dept: INTERNAL MEDICINE CLINIC | Facility: CLINIC | Age: 84
End: 2019-05-20

## 2019-05-20 LAB
BACTERIA SPEC CULT: ABNORMAL
CC UR VC: ABNORMAL
SERVICE CMNT-IMP: ABNORMAL

## 2019-05-20 NOTE — TELEPHONE ENCOUNTER
I need to see her to order MRI and have it approved by insurance. Tomorrow will need to make HA a priority and DMV forms to be discussed later.

## 2019-05-20 NOTE — TELEPHONE ENCOUNTER
CHIEF COMPLAINT:  Chief Complaint   Patient presents with   • Follow-up     3 mo fuv, labs diabetes; bilateral foot and ankle edema       HISTORY OF PRESENT ILLNESS:  Maikol Leon is a 76 year old female presenting with the following problems:  1. Essential hypertension -no recent self blood pressures are presented. No complaints of feeling dizzy. Continues treatment as below.    2. Uncontrolled type 2 diabetes mellitus with stage 4 chronic kidney disease, with long-term current use of insulin (CMS/Formerly [facility]) -brings in her blood sugars. Readings average 100-150 3 times a day before meals. States infrequent hypoglycemic episodes. Continues insulin as below.    3. Sleep apnea, unspecified type -uses C Pap routinely at night. Voices no difficulties with machine or mask.    4. Coronary artery disease involving native coronary artery of native heart without angina pectoris -denies chest pain. No change in activity tolerance.    5. Bilateral leg edema -patient with multiple problems that include COPD, pulmonary hypertension, persistent atrial fibrillation, as well as chronic kidney disease stage IV. The patient states ongoing problems with leg swelling and some weeping. Does wear compression stockings routinely. Tries to elevate her legs during the day on occasion. Continues diuretic treatment below which includes furosemide, spironolactone, and Zaroxolyn. She however states that she will eat canned soup once every 2 weeks and more routinely, TV dinners. She does not usually look salt content. Otherwise not cook with salt. States is difficult to cook for 1 person.        MEDICATIONS:  Current Outpatient Prescriptions   Medication Sig Dispense Refill   • insulin detemir (LEVEMIR) 100 UNIT/ML injectable solution Inject 30 Units into the skin nightly. Replaced lantus which is no longer preferred 30 mL 3   • insulin aspart (NOVOLOG) 100 UNIT/ML injectable solution Inject 12 Units into the skin 3 times daily (before meals). 10 mL  Pt's daughter called and stated that pt was seen in the ER at [facility] on 5/18. She stated that the physician in the ER wants the pt to get an MRI. Not sure if that can be ordered from the notes or whether or not the patient needs to be seen. Pt's daughter can be reached at [phone] or [phone]. She stated her mother is still having headaches and not feeling well. She has an appt tomorrow with  [facility] already. 12   • FREESTYLE LITE test strip TEST 4 TIMES A  strip 11   • Magnesium Oxide 400 (241.3 Mg) MG Tab TAKE 1 TABLET BY MOUTH 2 TIMES DAILY. 180 tablet 3   • furosemide (LASIX) 40 MG tablet TAKE 3 TABLETS DAILY 270 tablet 3   • esomeprazole (NEXIUM) 40 MG capsule TAKE 1 CAPSULE DAILY 90 capsule 3   • Insulin Syringes, Disposable, U-100 1 ML Misc Uses 3 syringes per day 270 each 3   • DISPENSE Please contact  Patient regarding  Need for  Replacement CPAP  Mask.  Diagnosis is SAMPSON.  NPI [id number] 1 each 11   • albuterol-ipratropium 2.5 mg/0.5 mg (DUONEB) 0.5-2.5 (3) MG/3ML nebulizer solution USE ONE VIAL (3MLS) FOUR TIMES DAILY PER NEBULIZER. 360 mL 7   • flunisolide 25 MCG/ACT (0.025%) nasal spray Spray 1 spray in each nostril daily. 50 Bottle 3   • allopurinol (ZYLOPRIM) 300 MG tablet TAKE 1 TABLET DAILY 90 tablet 3   • JANTOVEN 5 MG tablet TAKE 1/2 TABLET ON SUNDAYS AND TAKE 1 TABLET ALL OTHERDAYS OF THE WEEK 84 tablet 3   • metoPROLOL (LOPRESSOR) 50 MG tablet TAKE 1 TABLET TWICE A  tablet 3   • quinapril (ACCUPRIL) 40 MG tablet TAKE 1 TABLET DAILY 90 tablet 3   • spironolactone (ALDACTONE) 25 MG tablet TAKE 0.5 TABLETS BY MOUTH DAILY. 45 tablet 3   • metolazone (ZAROXOLYN) 5 MG tablet TAKE 1 TABLET DAILY 90 tablet 3   • lovastatin (MEVACOR) 20 MG tablet Take 1 tablet by mouth daily. 90 tablet 3   • blood glucose lancets Test blood sugar 4 times daily as directed. Diagnosis: E11.22  Meter: freestyle lite 100 each 11   • fluticasone-salmeterol (ADVAIR) 500-50 MCG/DOSE AEROSOL POWDER, BREATH ACTIVATED Inhale 1 puff into the lungs two times daily. 3 each 3   • blood glucose meter Test blood sugar 4 times daily as directed. Diagnosis: E11.22. Meter: free style lite 1 kit 0   • DISPENSE Please provide with cpap supplies.  Diagnosis is SAMPSON.  NPI [id number] 1 each 11   • albuterol 108 (90 BASE) MCG/ACT inhaler Inhale 2 puffs into the lungs 4 times daily as needed for Wheezing. 3 Inhaler 3   • DISPENSE Please  dispense nebulizer machine and supplies.  DX: J45.40 1 Units 0   • Blood Glucose Monitoring Suppl (BLOOD GLUCOSE METER) KIT DX: 250.00 Freestyle freedom lite. 1 kit 0   • blood glucose test strip Test blood sugar 4 times daily as directed. Diagnosis: E11.22. Meter: FreeStyle Lite 100 each 11   • Tiotropium Bromide Monohydrate 2.5 MCG/ACT Aero Soln Inhale 2 puffs into the lungs daily. 3 Inhaler 3     No current facility-administered medications for this visit.      ALLERGIES:  ALLERGIES:  No Known Allergies  REVIEW OF SYSTEMS:  General:  No syncope, lightheadedness or dizziness.  No fevers or chills.  Cardiovascular:  No chest pains, orthopnea, PND (paroxysmal nocturnal dyspnea), wheezing, or cough.  Gastrointestinal:  No nausea, vomiting, diarrhea, constipation.  No bloody stools or black stools.  Genitourinary:  No dysuria, hematuria.  No changes in urination pattern.  Musculoskeletal:  No new arthralgias, myalgias, paresthesias or weakness. States ongoing left ankle pain with weightbearing primarily.  Skin:  No lumps, bumps, rashes or moles.    PHYSICAL EXAMINATION:  Visit Vitals  /60   Pulse 80   Ht 5' 6\" (1.676 m)   Wt 106.3 kg   BMI 37.82 kg/m²     General:  White female, pleasant in no obvious distress.  Lungs:  Clear to auscultation.  Respiratory effort is normal.  Heart:  Heart regular rate and rhythm.    Extremities:  1-2+ edema, slight weeping noted on the left.Plantar surfaces without open lesions.  Dorsalis pedis pulse difficult to palpate bilaterally. Nylon filament examination reveals intact sensation bilaterally.       LAB AND X-RAY:  Hemoglobin A1C (%)   Date Value   09/06/2017 8.3 (H)       ASSESSMENT:  1. Essential hypertension    2. Uncontrolled type 2 diabetes mellitus with stage 4 chronic kidney disease, with long-term current use of insulin (CMS/Formerly [facility])    3. Sleep apnea, unspecified type    4. Coronary artery disease involving native coronary artery of native heart without angina pectoris     5. Bilateral leg edema                We discussed importance of limiting salt intake. Provided information on 2 g salt restricted diet.  Continue current medications.  Continue with leg compression stockings and leg elevation while sitting.  Asked patient to increase her NovoLog insulin to 12 units 3 times a day with meals.    Orders Placed This Encounter   • Glycohemoglobin   • Lipid Panel with Reflex   • blood glucose test strip   • insulin detemir (LEVEMIR) 100 UNIT/ML injectable solution   • insulin aspart (NOVOLOG) 100 UNIT/ML injectable solution       Return in about 3 months (around 12/12/2017).  After visit summary given.

## 2019-05-21 ENCOUNTER — OFFICE VISIT (OUTPATIENT)
Dept: INTERNAL MEDICINE CLINIC | Facility: CLINIC | Age: 84
End: 2019-05-21

## 2019-05-21 VITALS
OXYGEN SATURATION: 98 % | BODY MASS INDEX: 19.51 KG/M2 | WEIGHT: 106 LBS | DIASTOLIC BLOOD PRESSURE: 80 MMHG | SYSTOLIC BLOOD PRESSURE: 146 MMHG | HEIGHT: 62 IN | HEART RATE: 76 BPM | TEMPERATURE: 97.7 F | RESPIRATION RATE: 16 BRPM

## 2019-05-21 DIAGNOSIS — R10.32 LEFT LOWER QUADRANT PAIN: ICD-10-CM

## 2019-05-21 DIAGNOSIS — R42 DIZZINESS: ICD-10-CM

## 2019-05-21 DIAGNOSIS — Z86.73 HX-TIA (TRANSIENT ISCHEMIC ATTACK): ICD-10-CM

## 2019-05-21 DIAGNOSIS — R90.89 ABNORMAL CT OF BRAIN: ICD-10-CM

## 2019-05-21 DIAGNOSIS — G44.52 NEW DAILY PERSISTENT HEADACHE: Primary | ICD-10-CM

## 2019-05-21 DIAGNOSIS — E78.00 HYPERCHOLESTEROLEMIA: ICD-10-CM

## 2019-05-21 DIAGNOSIS — R26.9 GAIT DISTURBANCE: ICD-10-CM

## 2019-05-21 DIAGNOSIS — R51.9 TEMPORAL PAIN: ICD-10-CM

## 2019-05-21 RX ORDER — PRAVASTATIN SODIUM 20 MG/1
TABLET ORAL
Qty: 30 TAB | Refills: 4 | Status: SHIPPED | OUTPATIENT
Start: 2019-05-21 | End: 2020-01-17 | Stop reason: SDUPTHER

## 2019-05-21 NOTE — PROGRESS NOTES
HPI  Ms. Remington Casarez is a 80y.o. year old female, she is seen today for headache, dizziness. Was extremely tired, then felt weight on her head, severe pain over whole head. Felt dizzy, hard to balance, felt too weak to stand. Happened on 5/18. Head pressure was improved after being treated in ED. Has been living with her daughter since. Tylenol helped head pain. Continues to have headache on and off. Had vomiting in ED. Total of about 5x per daughter. Moving made head pressure and dizziness worse. No focal weakness. Prior to episode above has had pain for about 2 weeks on temporal region left side. Hasn't noticed seeing spots or stars. No jaw pain. Ears have been feeling sore for about a week. Yesterday complained of sore throat since yesterday, has dry cough. Has been off balance, not walking well. Almost falling . Feels meclizine tid helps. Had eye exam last week - Dr. Candy Goyal. Also complains of left lower abdominal pain - no pain now. Will hurt if she eats too much. Abdominal pain better after BM. Chief Complaint   Patient presents with    Headache     Room 2C// ED visit on 5/18 // daughter reports L side head pain x weeks- requests MRI // has DMV paperwork // NON fasting // [facility]    Dizziness        Prior to Admission medications    Medication Sig Start Date End Date Taking? Authorizing Provider   pravastatin (PRAVACHOL) 20 mg tablet TAKE 1 TABLET EVERY DAY  (  DOSE  LOWERED) 5/21/19  Yes Tawana Quinteros MD   meclizine (ANTIVERT) 25 mg tablet Take 1 Tab by mouth three (3) times daily as needed for Dizziness. 5/18/19  Yes Tasha Bonds MD   Cholecalciferol, Vitamin D3, (VITAMIN D3) 2,000 unit cap capsule Take 2,000 Units by mouth daily. 11/7/17  Yes Tawana Quinteros MD   ACETAMINOPHEN (TYLENOL PO) Take  by mouth as needed. Yes Provider, Historical   VIT C/VIT E/LUTEIN/MIN/OMEGA-3 (OCUVITE PO) Take  by mouth daily.    Yes Provider, Historical Allergies   Allergen Reactions    Lipitor [Atorvastatin] Myalgia    Pravastatin Myalgia    Zetia [Ezetimibe] Other (comments)     itching         REVIEW OF SYSTEMS:  Per HPI    PHYSICAL EXAM:  Visit Vitals  /80   Pulse 76   Temp 97.7 °F (36.5 °C) (Oral)   Resp 16   Ht 5' 2\" (1.575 m)   Wt 106 lb (48.1 kg)   SpO2 98%   BMI 19.39 kg/m²     Constitutional: Appears well-developed and well-nourished. No distress. HENT:   Head: Normocephalic and atraumatic. Eyes: No scleral icterus. Cardiovascular: Normal S1/S2, regular rhythm. No murmurs, rubs, or gallops. Pulmonary/Chest: Effort normal and breath sounds normal. No respiratory distress. No wheezes, rhonchi, or rales. Abdomen: Soft, mild tenderness llq/ND, +BS, no rebound or guarding, no masses, no HSM appreciated. Ext: No edema. Neurological: Alert. Strength 5/5 b/l ue and le, difficulty with rapid alt hand movements, unable to perform tandem gait due to imbalance, CN II-XII intact. Psychiatric: Normal mood and affect. Behavior is normal.     Lab Results   Component Value Date/Time    Sodium 139 05/18/2019 03:29 PM    Potassium 4.0 05/18/2019 03:29 PM    Chloride 105 05/18/2019 03:29 PM    CO2 28 05/18/2019 03:29 PM    Anion gap 6 05/18/2019 03:29 PM    Glucose 130 (H) 05/18/2019 03:29 PM    BUN 16 05/18/2019 03:29 PM    Creatinine 0.82 05/18/2019 03:29 PM    BUN/Creatinine ratio 20 05/18/2019 03:29 PM    GFR est AA >60 05/18/2019 03:29 PM    GFR est non-AA >60 05/18/2019 03:29 PM    Calcium 8.4 (L) 05/18/2019 03:29 PM    Bilirubin, total 0.6 05/18/2019 03:29 PM    AST (SGOT) 20 05/18/2019 03:29 PM    Alk.  phosphatase 55 05/18/2019 03:29 PM    Protein, total 7.2 05/18/2019 03:29 PM    Albumin 4.0 05/18/2019 03:29 PM    Globulin 3.2 05/18/2019 03:29 PM    A-G Ratio 1.3 05/18/2019 03:29 PM    ALT (SGPT) 19 05/18/2019 03:29 PM     No results found for: HBA1C, HGBE8, GAP2YJJQ, ZYW4FHDV   Lab Results   Component Value Date/Time Cholesterol, total 172 05/01/2019 08:43 AM    HDL Cholesterol 78 05/01/2019 08:43 AM    LDL, calculated 81 05/01/2019 08:43 AM    VLDL, calculated 13 05/01/2019 08:43 AM    Triglyceride 64 05/01/2019 08:43 AM    CHOL/HDL Ratio 2.6 10/12/2010 08:57 AM          ASSESSMENT/PLAN  Diagnoses and all orders for this visit:    1. New daily persistent headache  -     MRI BRAIN W WO CONT; Future    2. Dizziness  -     MRI BRAIN W WO CONT; Future    3. Gait disturbance  -     MRI BRAIN W WO CONT; Future    4. Hx-TIA (transient ischemic attack)  -     MRI BRAIN W WO CONT; Future    5. Abnormal CT of brain  -     MRI BRAIN W WO CONT; Future    6. Temporal pain  -     SED RATE (ESR)  -     C REACTIVE PROTEIN, QT  -     MRI BRAIN W WO CONT; Future    7. Hypercholesterolemia  -     pravastatin (PRAVACHOL) 20 mg tablet; TAKE 1 TABLET EVERY DAY  (  DOSE  LOWERED)    8. Left lower quadrant pain  Suspect related to constipation- monitor    Given new daily persistent HA, balance disorder, abnormal CT head will get further eval with MRI. Health Maintenance Due   Topic Date Due    Pneumococcal 65+ years (2 of 2 - PPSV23) 07/06/2016        Follow-up and Dispositions    · Return in about 2 weeks (around 6/4/2019) for dizziness. Reviewed plan of care. Patient has provided input and agrees with goals. The nurse provided the patient and/or family with advanced directive information if needed and encouraged the patient to provide a copy to the office when available.

## 2019-05-22 LAB
CRP SERPL-MCNC: 0.7 MG/L (ref 0–4.9)
ERYTHROCYTE [SEDIMENTATION RATE] IN BLOOD BY WESTERGREN METHOD: 16 MM/HR (ref 0–40)

## 2019-05-23 ENCOUNTER — TELEPHONE (OUTPATIENT)
Dept: INTERNAL MEDICINE CLINIC | Facility: CLINIC | Age: 84
End: 2019-05-23

## 2019-05-23 NOTE — TELEPHONE ENCOUNTER
States there was a concern about whether or not pt could have MRI done due to her hip replacement, and she wanted to advise that pt can have an MRI  May be reached at [phone] if further comment is needed

## 2019-05-24 NOTE — TELEPHONE ENCOUNTER
Bad connection yesterday after attempting to call daughter twice. Called daughter this morning and left VM that MRI was ordered and gave the # to scheduling.

## 2019-05-25 ENCOUNTER — HOSPITAL ENCOUNTER (OUTPATIENT)
Dept: MRI IMAGING | Age: 84
Discharge: HOME | End: 2019-05-25
Attending: INTERNAL MEDICINE
Payer: MEDICARE

## 2019-05-25 DIAGNOSIS — R90.89 ABNORMAL CT OF BRAIN: ICD-10-CM

## 2019-05-25 DIAGNOSIS — G44.52 NEW DAILY PERSISTENT HEADACHE: ICD-10-CM

## 2019-05-25 DIAGNOSIS — R42 DIZZINESS: ICD-10-CM

## 2019-05-25 DIAGNOSIS — R26.9 GAIT DISTURBANCE: ICD-10-CM

## 2019-05-25 DIAGNOSIS — R51.9 TEMPORAL PAIN: ICD-10-CM

## 2019-05-25 DIAGNOSIS — Z86.73 HX-TIA (TRANSIENT ISCHEMIC ATTACK): ICD-10-CM

## 2019-05-25 PROCEDURE — A9575 INJ GADOTERATE MEGLUMI 0.1ML: HCPCS | Performed by: INTERNAL MEDICINE

## 2019-05-25 PROCEDURE — 70553 MRI BRAIN STEM W/O & W/DYE: CPT

## 2019-05-25 PROCEDURE — 74011250636 HC RX REV CODE- 250/636: Performed by: INTERNAL MEDICINE

## 2019-05-25 RX ORDER — GADOTERATE MEGLUMINE 376.9 MG/ML
15 INJECTION INTRAVENOUS
Status: COMPLETED | OUTPATIENT
Start: 2019-05-25 | End: 2019-05-25

## 2019-05-25 RX ADMIN — GADOTERATE MEGLUMINE 10 ML: 376.9 INJECTION INTRAVENOUS at 10:35

## 2019-05-28 NOTE — PROGRESS NOTES
Pt notified of results.
Tell her no mass or bleed, chronic changes consistent with small alfredo of decreased blood flow over years - usual for aging
Yes

## 2019-05-29 ENCOUNTER — TELEPHONE (OUTPATIENT)
Dept: INTERNAL MEDICINE CLINIC | Facility: CLINIC | Age: 84
End: 2019-05-29

## 2019-05-29 NOTE — TELEPHONE ENCOUNTER
Daughter would like to know if you suggest any other testing while she is still of with her mom this week such as carotid artery testing.

## 2019-05-29 NOTE — TELEPHONE ENCOUNTER
Not based on symptoms. If there is earlier appt available and she is anxious to be seen please move it up.

## 2019-05-29 NOTE — TELEPHONE ENCOUNTER
Will need to see her , evaluate ability to drive, fill out DMV form if appropriate. Will need information from patient and daughter regarding HA, weakness, hearing, ability to synthesize and understand information - all as part of visit.

## 2019-05-29 NOTE — TELEPHONE ENCOUNTER
Christiano Dickerson is calling wanting to know what the next steps are along with the results of the MRI.     [phone]

## 2019-05-31 ENCOUNTER — OFFICE VISIT (OUTPATIENT)
Dept: INTERNAL MEDICINE CLINIC | Facility: CLINIC | Age: 84
End: 2019-05-31

## 2019-05-31 VITALS
BODY MASS INDEX: 19.58 KG/M2 | HEIGHT: 62 IN | WEIGHT: 106.4 LBS | HEART RATE: 76 BPM | DIASTOLIC BLOOD PRESSURE: 80 MMHG | OXYGEN SATURATION: 98 % | SYSTOLIC BLOOD PRESSURE: 148 MMHG | RESPIRATION RATE: 14 BRPM | TEMPERATURE: 97.6 F

## 2019-05-31 DIAGNOSIS — Z97.4 WEARS HEARING AID: ICD-10-CM

## 2019-05-31 DIAGNOSIS — N30.01 ACUTE CYSTITIS WITH HEMATURIA: Primary | ICD-10-CM

## 2019-05-31 DIAGNOSIS — R03.0 ELEVATED BP WITHOUT DIAGNOSIS OF HYPERTENSION: ICD-10-CM

## 2019-05-31 RX ORDER — NITROFURANTOIN 25; 75 MG/1; MG/1
100 CAPSULE ORAL 2 TIMES DAILY
Qty: 14 CAP | Refills: 0 | Status: SHIPPED | OUTPATIENT
Start: 2019-05-31 | End: 2019-06-07

## 2019-05-31 NOTE — PROGRESS NOTES
Remington Casarez  Identified pt with two pt identifiers(name and ). No chief complaint on file. 1. Have you been to the ER, urgent care clinic since your last visit? Hospitalized since your last visit? NO    2. Have you seen or consulted any other health care providers outside of the [address] [place] [address] since your last visit? Include any pap smears or colon screening. NO      Provider notified of reason for visit, vitals and flowsheets obtained on patients. Reviewed record In preparation for visit, huddled with provider and have obtained necessary documentation      Health Maintenance Due   Topic    Pneumococcal 65+ years (2 of 2 - PPSV23)       Wt Readings from Last 3 Encounters:   19 106 lb 6.4 oz (48.3 kg)   19 106 lb (48.1 kg)   19 108 lb 3.9 oz (49.1 kg)     Temp Readings from Last 3 Encounters:   19 97.7 °F (36.5 °C) (Oral)   19 98 °F (36.7 °C)   19 98.2 °F (36.8 °C) (Oral)     BP Readings from Last 3 Encounters:   19 146/80   19 122/67   19 124/78     Pulse Readings from Last 3 Encounters:   19 76   19 75   19 74     Vitals:    19 1135   Resp: 14   Weight: 106 lb 6.4 oz (48.3 kg)   Height: 5' 2\" (1.575 m)         Learning Assessment:  :     Learning Assessment 2014   PRIMARY LEARNER Patient   HIGHEST LEVEL OF EDUCATION - PRIMARY LEARNER  DID NOT GRADUATE HIGH SCHOOL   BARRIERS PRIMARY LEARNER NONE   CO-LEARNER CAREGIVER No   PRIMARY LANGUAGE ENGLISH   LEARNER PREFERENCE PRIMARY DEMONSTRATION   ANSWERED BY patient   RELATIONSHIP SELF       Depression Screening:  :     3 most recent PHQ Screens 2019   Little interest or pleasure in doing things Not at all   Feeling down, depressed, irritable, or hopeless Not at all   Total Score PHQ 2 0       Fall Risk Assessment:  :     Fall Risk Assessment, last 12 mths 2019   Able to walk? Yes   Fall in past 12 months?  No       Abuse Screening:  :     Abuse Screening Questionnaire 5/8/2019 11/7/2017 11/10/2016 6/6/2014   Do you ever feel afraid of your partner? N N N N   Are you in a relationship with someone who physically or mentally threatens you? N N N N   Is it safe for you to go home? Y Y Y Y       ADL Screening:  :     ADL Assessment 11/7/2017   Feeding yourself No Help Needed   Getting from bed to chair No Help Needed   Getting dressed No Help Needed   Bathing or showering No Help Needed   Walk across the room (includes cane/walker) No Help Needed   Using the telphone No Help Needed   Taking your medications No Help Needed   Preparing meals No Help Needed   Managing money (expenses/bills) No Help Needed   Moderately strenuous housework (laundry) No Help Needed   Shopping for personal items (toiletries/medicines) No Help Needed   Shopping for groceries No Help Needed   Driving No Help Needed   Climbing a flight of stairs No Help Needed   Getting to places beyond walking distances No Help Needed         Medication reconciliation up to date and corrected with patient at this time.

## 2019-05-31 NOTE — PROGRESS NOTES
HPI  Ms. Linda Saunders is a 80y.o. year old female, she is seen today for follow up dizziness and HA. No confusion - no dizziness or lightheadedness. No n/v/abd pain. Appetite is good. Eating well and drinking well. Not feeling off balance or wobbly. Wears hearing aids - wasn't wearing hearing aids the day she was driving. Went over MRI brain results with chronic microvascular changes. Needs DMV forms filled out. Reviewed ED records with UTI - never treated. Chief Complaint   Patient presents with    Dizziness     Room 2A//         Prior to Admission medications    Medication Sig Start Date End Date Taking? Authorizing Provider   nitrofurantoin, macrocrystal-monohydrate, (MACROBID) 100 mg capsule Take 1 Cap by mouth two (2) times a day for 7 days. 5/31/19 6/7/19 Yes Heather Stallings MD   pravastatin (PRAVACHOL) 20 mg tablet TAKE 1 TABLET EVERY DAY  (  DOSE  LOWERED) 5/21/19  Yes Heather Stallings MD   Cholecalciferol, Vitamin D3, (VITAMIN D3) 2,000 unit cap capsule Take 2,000 Units by mouth daily. 11/7/17  Yes Heather Stallings MD   ACETAMINOPHEN (TYLENOL PO) Take  by mouth as needed. Yes Provider, Historical   VIT C/VIT E/LUTEIN/MIN/OMEGA-3 (OCUVITE PO) Take  by mouth daily. Yes Provider, Historical   meclizine (ANTIVERT) 25 mg tablet Take 1 Tab by mouth three (3) times daily as needed for Dizziness. 5/18/19   Mela Bal MD         Allergies   Allergen Reactions    Lipitor [Atorvastatin] Myalgia    Pravastatin Myalgia    Zetia [Ezetimibe] Other (comments)     itching         REVIEW OF SYSTEMS:  Per HPI    PHYSICAL EXAM:  Visit Vitals  /80   Pulse 76   Temp 97.6 °F (36.4 °C) (Oral)   Resp 14   Ht 5' 2\" (1.575 m)   Wt 106 lb 6.4 oz (48.3 kg)   SpO2 98%   BMI 19.46 kg/m²     Constitutional: Appears well-developed and well-nourished. No distress. HENT:   Head: Normocephalic and atraumatic. Eyes: No scleral icterus.    Neck: no lad, no tm, supple, FROM other than slight decrease lateral movements  Cardiovascular: Normal S1/S2, regular rhythm. No murmurs, rubs, or gallops. Pulmonary/Chest: Effort normal and breath sounds normal. No respiratory distress. No wheezes, rhonchi, or rales. Ext: No edema. Neurological: Alert. Strength 5/5 b/l UE and LE, CN II-XII intact, able to walk without difficulty, A&O x 3, fluent speech. Cerebellum intact to rapid alt hand movements. Psychiatric: Normal mood and affect. Behavior is normal. Good insight. Lab Results   Component Value Date/Time    Sodium 139 05/18/2019 03:29 PM    Potassium 4.0 05/18/2019 03:29 PM    Chloride 105 05/18/2019 03:29 PM    CO2 28 05/18/2019 03:29 PM    Anion gap 6 05/18/2019 03:29 PM    Glucose 130 (H) 05/18/2019 03:29 PM    BUN 16 05/18/2019 03:29 PM    Creatinine 0.82 05/18/2019 03:29 PM    BUN/Creatinine ratio 20 05/18/2019 03:29 PM    GFR est AA >60 05/18/2019 03:29 PM    GFR est non-AA >60 05/18/2019 03:29 PM    Calcium 8.4 (L) 05/18/2019 03:29 PM    Bilirubin, total 0.6 05/18/2019 03:29 PM    AST (SGOT) 20 05/18/2019 03:29 PM    Alk. phosphatase 55 05/18/2019 03:29 PM    Protein, total 7.2 05/18/2019 03:29 PM    Albumin 4.0 05/18/2019 03:29 PM    Globulin 3.2 05/18/2019 03:29 PM    A-G Ratio 1.3 05/18/2019 03:29 PM    ALT (SGPT) 19 05/18/2019 03:29 PM     No results found for: HBA1C, HGBE8, [id number], [id number]   Lab Results   Component Value Date/Time    Cholesterol, total 172 05/01/2019 08:43 AM    HDL Cholesterol 78 05/01/2019 08:43 AM    LDL, calculated 81 05/01/2019 08:43 AM    VLDL, calculated 13 05/01/2019 08:43 AM    Triglyceride 64 05/01/2019 08:43 AM    CHOL/HDL Ratio 2.6 10/12/2010 08:57 AM          ASSESSMENT/PLAN  Diagnoses and all orders for this visit:    1. Acute cystitis with hematuria  -     nitrofurantoin, macrocrystal-monohydrate, (MACROBID) 100 mg capsule; Take 1 Cap by mouth two (2) times a day for 7 days. 2. Wears hearing aid    3.  Elevated BP without diagnosis of hypertension    DMV forms filled out with patient in room and scanned  Treat UTI as above  BP slightly high - monitor - has been under stress with bathroom remodel soon to be completed  If still high at follow up consider amlodipine 2.5-5mg daily      Health Maintenance Due   Topic Date Due    Pneumococcal 65+ years (2 of 2 - PPSV23) 07/06/2016               Reviewed plan of care. Patient has provided input and agrees with goals. The nurse provided the patient and/or family with advanced directive information if needed and encouraged the patient to provide a copy to the office when available.

## 2019-06-02 PROBLEM — Z97.4 WEARS HEARING AID: Status: ACTIVE | Noted: 2019-06-02

## 2019-06-08 ENCOUNTER — APPOINTMENT (OUTPATIENT)
Dept: CT IMAGING | Age: 84
DRG: 641 | End: 2019-06-08
Attending: EMERGENCY MEDICINE
Payer: MEDICARE

## 2019-06-08 ENCOUNTER — HOSPITAL ENCOUNTER (INPATIENT)
Age: 84
LOS: 3 days | Discharge: HOME | DRG: 641 | End: 2019-06-11
Attending: EMERGENCY MEDICINE | Admitting: INTERNAL MEDICINE
Payer: MEDICARE

## 2019-06-08 ENCOUNTER — APPOINTMENT (OUTPATIENT)
Dept: GENERAL RADIOLOGY | Age: 84
DRG: 641 | End: 2019-06-08
Attending: EMERGENCY MEDICINE
Payer: MEDICARE

## 2019-06-08 DIAGNOSIS — G44.209 TENSION HEADACHE: ICD-10-CM

## 2019-06-08 DIAGNOSIS — N30.01 ACUTE CYSTITIS WITH HEMATURIA: Primary | ICD-10-CM

## 2019-06-08 DIAGNOSIS — E86.0 DEHYDRATION: ICD-10-CM

## 2019-06-08 DIAGNOSIS — R93.5 ABNORMAL CT OF THE ABDOMEN: ICD-10-CM

## 2019-06-08 DIAGNOSIS — R11.2 NAUSEA AND VOMITING, INTRACTABILITY OF VOMITING NOT SPECIFIED, UNSPECIFIED VOMITING TYPE: ICD-10-CM

## 2019-06-08 LAB
ALBUMIN SERPL-MCNC: 3.6 G/DL (ref 3.5–5)
ALBUMIN/GLOB SERPL: 1 {RATIO} (ref 1.1–2.2)
ALP SERPL-CCNC: 55 U/L (ref 45–117)
ALT SERPL-CCNC: 17 U/L (ref 12–78)
ANION GAP SERPL CALC-SCNC: 6 MMOL/L (ref 5–15)
APPEARANCE UR: ABNORMAL
AST SERPL-CCNC: 15 U/L (ref 15–37)
BACTERIA URNS QL MICRO: NEGATIVE /HPF
BASOPHILS # BLD: 0 K/UL (ref 0–0.1)
BASOPHILS NFR BLD: 0 % (ref 0–1)
BILIRUB SERPL-MCNC: 0.6 MG/DL (ref 0.2–1)
BILIRUB UR QL: NEGATIVE
BUN SERPL-MCNC: 13 MG/DL (ref 6–20)
BUN/CREAT SERPL: 14 (ref 12–20)
CALCIUM SERPL-MCNC: 8.2 MG/DL (ref 8.5–10.1)
CHLORIDE SERPL-SCNC: 102 MMOL/L (ref 97–108)
CK SERPL-CCNC: 68 U/L (ref 26–192)
CO2 SERPL-SCNC: 27 MMOL/L (ref 21–32)
COLOR UR: ABNORMAL
CREAT SERPL-MCNC: 0.93 MG/DL (ref 0.55–1.02)
DIFFERENTIAL METHOD BLD: ABNORMAL
EOSINOPHIL # BLD: 0.1 K/UL (ref 0–0.4)
EOSINOPHIL NFR BLD: 1 % (ref 0–7)
EPITH CASTS URNS QL MICRO: ABNORMAL /LPF
ERYTHROCYTE [DISTWIDTH] IN BLOOD BY AUTOMATED COUNT: 13.4 % (ref 11.5–14.5)
GLOBULIN SER CALC-MCNC: 3.5 G/DL (ref 2–4)
GLUCOSE SERPL-MCNC: 182 MG/DL (ref 65–100)
GLUCOSE UR STRIP.AUTO-MCNC: 100 MG/DL
HCT VFR BLD AUTO: 39.6 % (ref 35–47)
HGB BLD-MCNC: 12.6 G/DL (ref 11.5–16)
HGB UR QL STRIP: ABNORMAL
IMM GRANULOCYTES # BLD AUTO: 0.1 K/UL (ref 0–0.04)
IMM GRANULOCYTES NFR BLD AUTO: 1 % (ref 0–0.5)
KETONES UR QL STRIP.AUTO: ABNORMAL MG/DL
LACTATE SERPL-SCNC: 0.9 MMOL/L (ref 0.4–2)
LEUKOCYTE ESTERASE UR QL STRIP.AUTO: ABNORMAL
LIPASE SERPL-CCNC: 68 U/L (ref 73–393)
LYMPHOCYTES # BLD: 0.3 K/UL (ref 0.8–3.5)
LYMPHOCYTES NFR BLD: 2 % (ref 12–49)
MCH RBC QN AUTO: 29.5 PG (ref 26–34)
MCHC RBC AUTO-ENTMCNC: 31.8 G/DL (ref 30–36.5)
MCV RBC AUTO: 92.7 FL (ref 80–99)
MONOCYTES # BLD: 0.4 K/UL (ref 0–1)
MONOCYTES NFR BLD: 3 % (ref 5–13)
NEUTS SEG # BLD: 12 K/UL (ref 1.8–8)
NEUTS SEG NFR BLD: 93 % (ref 32–75)
NITRITE UR QL STRIP.AUTO: NEGATIVE
NRBC # BLD: 0 K/UL (ref 0–0.01)
NRBC BLD-RTO: 0 PER 100 WBC
PH UR STRIP: 7 [PH] (ref 5–8)
PLATELET # BLD AUTO: 159 K/UL (ref 150–400)
PMV BLD AUTO: 9.9 FL (ref 8.9–12.9)
POTASSIUM SERPL-SCNC: 3.9 MMOL/L (ref 3.5–5.1)
PROT SERPL-MCNC: 7.1 G/DL (ref 6.4–8.2)
PROT UR STRIP-MCNC: 30 MG/DL
RBC # BLD AUTO: 4.27 M/UL (ref 3.8–5.2)
RBC #/AREA URNS HPF: ABNORMAL /HPF (ref 0–5)
RBC MORPH BLD: ABNORMAL
SODIUM SERPL-SCNC: 135 MMOL/L (ref 136–145)
SP GR UR REFRACTOMETRY: 1.02 (ref 1–1.03)
TROPONIN I SERPL-MCNC: <0.05 NG/ML
UROBILINOGEN UR QL STRIP.AUTO: 1 EU/DL (ref 0.2–1)
WBC # BLD AUTO: 12.9 K/UL (ref 3.6–11)
WBC URNS QL MICRO: ABNORMAL /HPF (ref 0–4)

## 2019-06-08 PROCEDURE — 96375 TX/PRO/DX INJ NEW DRUG ADDON: CPT

## 2019-06-08 PROCEDURE — 96361 HYDRATE IV INFUSION ADD-ON: CPT

## 2019-06-08 PROCEDURE — 65660000000 HC RM CCU STEPDOWN

## 2019-06-08 PROCEDURE — 83690 ASSAY OF LIPASE: CPT

## 2019-06-08 PROCEDURE — 87040 BLOOD CULTURE FOR BACTERIA: CPT

## 2019-06-08 PROCEDURE — 74022 RADEX COMPL AQT ABD SERIES: CPT

## 2019-06-08 PROCEDURE — 96367 TX/PROPH/DG ADDL SEQ IV INF: CPT

## 2019-06-08 PROCEDURE — 74011250636 HC RX REV CODE- 250/636: Performed by: EMERGENCY MEDICINE

## 2019-06-08 PROCEDURE — 74011636320 HC RX REV CODE- 636/320: Performed by: EMERGENCY MEDICINE

## 2019-06-08 PROCEDURE — 36415 COLL VENOUS BLD VENIPUNCTURE: CPT

## 2019-06-08 PROCEDURE — 74011250636 HC RX REV CODE- 250/636: Performed by: INTERNAL MEDICINE

## 2019-06-08 PROCEDURE — 74177 CT ABD & PELVIS W/CONTRAST: CPT

## 2019-06-08 PROCEDURE — 80053 COMPREHEN METABOLIC PANEL: CPT

## 2019-06-08 PROCEDURE — C9113 INJ PANTOPRAZOLE SODIUM, VIA: HCPCS | Performed by: INTERNAL MEDICINE

## 2019-06-08 PROCEDURE — 83605 ASSAY OF LACTIC ACID: CPT

## 2019-06-08 PROCEDURE — 85025 COMPLETE CBC W/AUTO DIFF WBC: CPT

## 2019-06-08 PROCEDURE — 74011000258 HC RX REV CODE- 258: Performed by: EMERGENCY MEDICINE

## 2019-06-08 PROCEDURE — 99285 EMERGENCY DEPT VISIT HI MDM: CPT

## 2019-06-08 PROCEDURE — 74011000250 HC RX REV CODE- 250: Performed by: INTERNAL MEDICINE

## 2019-06-08 PROCEDURE — 96365 THER/PROPH/DIAG IV INF INIT: CPT

## 2019-06-08 PROCEDURE — 81001 URINALYSIS AUTO W/SCOPE: CPT

## 2019-06-08 PROCEDURE — 74011000250 HC RX REV CODE- 250: Performed by: EMERGENCY MEDICINE

## 2019-06-08 PROCEDURE — 84484 ASSAY OF TROPONIN QUANT: CPT

## 2019-06-08 PROCEDURE — 82550 ASSAY OF CK (CPK): CPT

## 2019-06-08 RX ORDER — ONDANSETRON 2 MG/ML
4 INJECTION INTRAMUSCULAR; INTRAVENOUS
Status: COMPLETED | OUTPATIENT
Start: 2019-06-08 | End: 2019-06-08

## 2019-06-08 RX ORDER — ACETAMINOPHEN 650 MG/1
650 SUPPOSITORY RECTAL
Status: DISCONTINUED | OUTPATIENT
Start: 2019-06-08 | End: 2019-06-11 | Stop reason: HOSPADM

## 2019-06-08 RX ORDER — SODIUM CHLORIDE 0.9 % (FLUSH) 0.9 %
5-40 SYRINGE (ML) INJECTION AS NEEDED
Status: DISCONTINUED | OUTPATIENT
Start: 2019-06-08 | End: 2019-06-11 | Stop reason: HOSPADM

## 2019-06-08 RX ORDER — SODIUM CHLORIDE 0.9 % (FLUSH) 0.9 %
5-40 SYRINGE (ML) INJECTION EVERY 8 HOURS
Status: DISCONTINUED | OUTPATIENT
Start: 2019-06-08 | End: 2019-06-11 | Stop reason: HOSPADM

## 2019-06-08 RX ORDER — SODIUM CHLORIDE 9 MG/ML
75 INJECTION, SOLUTION INTRAVENOUS CONTINUOUS
Status: DISCONTINUED | OUTPATIENT
Start: 2019-06-08 | End: 2019-06-11

## 2019-06-08 RX ORDER — HEPARIN SODIUM 5000 [USP'U]/ML
5000 INJECTION, SOLUTION INTRAVENOUS; SUBCUTANEOUS EVERY 8 HOURS
Status: DISCONTINUED | OUTPATIENT
Start: 2019-06-09 | End: 2019-06-11 | Stop reason: HOSPADM

## 2019-06-08 RX ORDER — ACETAMINOPHEN 10 MG/ML
15 INJECTION, SOLUTION INTRAVENOUS ONCE
Status: COMPLETED | OUTPATIENT
Start: 2019-06-08 | End: 2019-06-08

## 2019-06-08 RX ORDER — FENTANYL CITRATE 50 UG/ML
6.25 INJECTION, SOLUTION INTRAMUSCULAR; INTRAVENOUS
Status: DISCONTINUED | OUTPATIENT
Start: 2019-06-08 | End: 2019-06-11 | Stop reason: HOSPADM

## 2019-06-08 RX ORDER — SODIUM CHLORIDE 0.9 % (FLUSH) 0.9 %
10 SYRINGE (ML) INJECTION
Status: COMPLETED | OUTPATIENT
Start: 2019-06-08 | End: 2019-06-08

## 2019-06-08 RX ORDER — ONDANSETRON 2 MG/ML
4 INJECTION INTRAMUSCULAR; INTRAVENOUS
Status: DISCONTINUED | OUTPATIENT
Start: 2019-06-08 | End: 2019-06-11 | Stop reason: HOSPADM

## 2019-06-08 RX ORDER — ACETAMINOPHEN 325 MG/1
650 TABLET ORAL
Status: DISCONTINUED | OUTPATIENT
Start: 2019-06-08 | End: 2019-06-11 | Stop reason: HOSPADM

## 2019-06-08 RX ADMIN — ACETAMINOPHEN 718.5 MG: 10 INJECTION, SOLUTION INTRAVENOUS at 19:13

## 2019-06-08 RX ADMIN — ONDANSETRON 4 MG: 2 INJECTION INTRAMUSCULAR; INTRAVENOUS at 18:43

## 2019-06-08 RX ADMIN — IOPAMIDOL 100 ML: 755 INJECTION, SOLUTION INTRAVENOUS at 21:18

## 2019-06-08 RX ADMIN — SODIUM CHLORIDE 500 ML: 900 INJECTION, SOLUTION INTRAVENOUS at 18:42

## 2019-06-08 RX ADMIN — FAMOTIDINE 20 MG: 10 INJECTION, SOLUTION INTRAVENOUS at 19:06

## 2019-06-08 RX ADMIN — SODIUM CHLORIDE 500 ML: 900 INJECTION, SOLUTION INTRAVENOUS at 20:53

## 2019-06-08 RX ADMIN — CEFTRIAXONE 1 G: 1 INJECTION, POWDER, FOR SOLUTION INTRAMUSCULAR; INTRAVENOUS at 21:45

## 2019-06-08 RX ADMIN — SODIUM CHLORIDE 500 ML: 900 INJECTION, SOLUTION INTRAVENOUS at 23:38

## 2019-06-08 RX ADMIN — Medication 10 ML: at 21:18

## 2019-06-08 RX ADMIN — Medication 10 ML: at 23:42

## 2019-06-08 RX ADMIN — SODIUM CHLORIDE 75 ML/HR: 900 INJECTION, SOLUTION INTRAVENOUS at 23:36

## 2019-06-08 RX ADMIN — SODIUM CHLORIDE 40 MG: 9 INJECTION, SOLUTION INTRAMUSCULAR; INTRAVENOUS; SUBCUTANEOUS at 23:39

## 2019-06-08 NOTE — ED NOTES
Patient presents after being seen here about 3 weeks ago. Patient's family states that when she was discharged from here and they were started on an antibiotic for UTI. Patient has vomited about 5 times today as reported by family. Patient reports her headache to be a 5/10. Patient reports sharp pain on the right side of her head.

## 2019-06-09 LAB
ANION GAP SERPL CALC-SCNC: 7 MMOL/L (ref 5–15)
BASOPHILS # BLD: 0 K/UL (ref 0–0.1)
BASOPHILS NFR BLD: 0 % (ref 0–1)
BUN SERPL-MCNC: 11 MG/DL (ref 6–20)
BUN/CREAT SERPL: 14 (ref 12–20)
CALCIUM SERPL-MCNC: 7.2 MG/DL (ref 8.5–10.1)
CHLORIDE SERPL-SCNC: 112 MMOL/L (ref 97–108)
CO2 SERPL-SCNC: 23 MMOL/L (ref 21–32)
COMMENT, HOLDF: NORMAL
CREAT SERPL-MCNC: 0.77 MG/DL (ref 0.55–1.02)
DIFFERENTIAL METHOD BLD: ABNORMAL
EOSINOPHIL # BLD: 0.3 K/UL (ref 0–0.4)
EOSINOPHIL NFR BLD: 3 % (ref 0–7)
ERYTHROCYTE [DISTWIDTH] IN BLOOD BY AUTOMATED COUNT: 13.8 % (ref 11.5–14.5)
GLUCOSE SERPL-MCNC: 102 MG/DL (ref 65–100)
HCT VFR BLD AUTO: 38.2 % (ref 35–47)
HGB BLD-MCNC: 12 G/DL (ref 11.5–16)
IMM GRANULOCYTES # BLD AUTO: 0 K/UL (ref 0–0.04)
IMM GRANULOCYTES NFR BLD AUTO: 0 % (ref 0–0.5)
LYMPHOCYTES # BLD: 0.3 K/UL (ref 0.8–3.5)
LYMPHOCYTES NFR BLD: 3 % (ref 12–49)
MCH RBC QN AUTO: 29.3 PG (ref 26–34)
MCHC RBC AUTO-ENTMCNC: 31.4 G/DL (ref 30–36.5)
MCV RBC AUTO: 93.4 FL (ref 80–99)
MONOCYTES # BLD: 0.4 K/UL (ref 0–1)
MONOCYTES NFR BLD: 5 % (ref 5–13)
NEUTS SEG # BLD: 8.8 K/UL (ref 1.8–8)
NEUTS SEG NFR BLD: 89 % (ref 32–75)
NRBC # BLD: 0 K/UL (ref 0–0.01)
NRBC BLD-RTO: 0 PER 100 WBC
PLATELET # BLD AUTO: 150 K/UL (ref 150–400)
PMV BLD AUTO: 10.1 FL (ref 8.9–12.9)
POTASSIUM SERPL-SCNC: 3.9 MMOL/L (ref 3.5–5.1)
RBC # BLD AUTO: 4.09 M/UL (ref 3.8–5.2)
SAMPLES BEING HELD,HOLD: NORMAL
SODIUM SERPL-SCNC: 142 MMOL/L (ref 136–145)
WBC # BLD AUTO: 9.9 K/UL (ref 3.6–11)

## 2019-06-09 PROCEDURE — 85025 COMPLETE CBC W/AUTO DIFF WBC: CPT

## 2019-06-09 PROCEDURE — 74011250636 HC RX REV CODE- 250/636: Performed by: INTERNAL MEDICINE

## 2019-06-09 PROCEDURE — C9113 INJ PANTOPRAZOLE SODIUM, VIA: HCPCS | Performed by: INTERNAL MEDICINE

## 2019-06-09 PROCEDURE — 74011000258 HC RX REV CODE- 258: Performed by: INTERNAL MEDICINE

## 2019-06-09 PROCEDURE — 80048 BASIC METABOLIC PNL TOTAL CA: CPT

## 2019-06-09 PROCEDURE — 74011000250 HC RX REV CODE- 250: Performed by: INTERNAL MEDICINE

## 2019-06-09 PROCEDURE — 36415 COLL VENOUS BLD VENIPUNCTURE: CPT

## 2019-06-09 PROCEDURE — 65660000000 HC RM CCU STEPDOWN

## 2019-06-09 RX ADMIN — HEPARIN SODIUM 5000 UNITS: 5000 INJECTION INTRAVENOUS; SUBCUTANEOUS at 14:59

## 2019-06-09 RX ADMIN — HEPARIN SODIUM 5000 UNITS: 5000 INJECTION INTRAVENOUS; SUBCUTANEOUS at 22:07

## 2019-06-09 RX ADMIN — Medication 10 ML: at 15:46

## 2019-06-09 RX ADMIN — SODIUM CHLORIDE 40 MG: 9 INJECTION, SOLUTION INTRAMUSCULAR; INTRAVENOUS; SUBCUTANEOUS at 21:07

## 2019-06-09 RX ADMIN — Medication 10 ML: at 06:17

## 2019-06-09 RX ADMIN — HEPARIN SODIUM 5000 UNITS: 5000 INJECTION INTRAVENOUS; SUBCUTANEOUS at 06:17

## 2019-06-09 RX ADMIN — SODIUM CHLORIDE 40 MG: 9 INJECTION, SOLUTION INTRAMUSCULAR; INTRAVENOUS; SUBCUTANEOUS at 10:01

## 2019-06-09 RX ADMIN — CEFTRIAXONE 1 G: 1 INJECTION, POWDER, FOR SOLUTION INTRAMUSCULAR; INTRAVENOUS at 21:07

## 2019-06-09 RX ADMIN — Medication 10 ML: at 21:13

## 2019-06-09 NOTE — PROGRESS NOTES
Hospitalist Progress Note    NAME: Anisa West   :  1932   MRN:  [id number]       Assessment / Plan:  Recurrent Nausea & vomiting, Abdominal Pain, Dizziness and Headaches POA, improving  Hyponatremia  Dehydration POA  UTI POA    Unknown etiology,   GI etiology? - Plan for EGD tomorrow, appreciate GI help. ?UTI, continue Rocephin  If symptoms continues then plan for HIDA with CCK, and/or US  Continue IVF  Continue PPI  NPO after midnight, currently Full Liquid diet  Other etiology, Complex migraine, IBS?,? Symptomatic GERD/Gastritis      Recent MRI brain with and without contrast negative for tumor or CVA    CT A/P with possible ileus? IV rocephin and f/u urine cultures    PRN low dose IV fentanyl and IV Zofran for symptomatic management       Leukocytosis without 2/4 SIRs/Sepsis criteria  Suspect due to UTI vs stress from above  IV rocephin and f/u urine cultures     Mild hyponatremia  Due to dehydration  IVF and monitor lytes     Hypercholesterolemia   Holding statins while NPO      18.5 - 24.9 Normal weight / Body mass index is 19.31 kg/m². Code status: DNR  Prophylaxis: H2B/PPI  Recommended Disposition: Home w/Family     Subjective:     Chief Complaint / Reason for Physician Visit  Feels better, still has some abd pain, but improved. No nausea, wants some food. Review of Systems:  Symptom Y/N Comments  Symptom Y/N Comments   Fever/Chills n   Chest Pain n    Poor Appetite y   Edema n    Cough n   Abdominal Pain y    Sputum n   Joint Pain     SOB/SMITH n   Pruritis/Rash     Nausea/vomit n   Tolerating PT/OT     Diarrhea    Tolerating Diet     Constipation    Other       Could NOT obtain due to:      Objective:     VITALS:   Last 24hrs VS reviewed since prior progress note.  Most recent are:  Patient Vitals for the past 24 hrs:   Temp Pulse Resp BP SpO2   19 0721 100.2 °F (37.9 °C) 81 17 101/44 90 %   19 0234 98.7 °F (37.1 °C) 82 16 101/47 92 %   19 0017 98.4 °F (36.9 °C) 81 16 114/53 97 %   06/08/19 2300 100.4 °F (38 °C) 80 16 93/46 94 %   06/08/19 2200    94/52 95 %   06/08/19 2000    102/44 95 %   06/08/19 1900    116/51 96 %   06/08/19 1845    115/51 97 %   06/08/19 1800    122/58 98 %   06/08/19 1743    118/64    06/08/19 1714 99.3 °F (37.4 °C) 89 16 109/79 99 %       Intake/Output Summary (Last 24 hours) at 6/9/2019 0842  Last data filed at 6/9/2019 0017  Gross per 24 hour   Intake 1423.1 ml   Output    Net 1423.1 ml        PHYSICAL EXAM:  General: WD, WN. Alert, cooperative, no acute distress    EENT:  EOMI. Anicteric sclerae. MMM  Resp:  CTA bilaterally, no wheezing or rales. No accessory muscle use  CV:  Regular  rhythm,  No edema  GI:  Soft, Non distended, Non tender.  +Bowel sounds  Neurologic:  Alert and oriented X 3, normal speech,   Psych:   Good insight. Not anxious nor agitated  Skin:  No rashes. No jaundice, poor skin tugor    Reviewed most current lab test results and cultures  YES  Reviewed most current radiology test results   YES  Review and summation of old records today    NO  Reviewed patient's current orders and MAR    YES  PMH/SH reviewed - no change compared to H&P  ________________________________________________________________________  Care Plan discussed with:    Comments   Patient x    Family      RN     Care Manager     Consultant  x                      Multidiciplinary team rounds were held today with , nursing, pharmacist and clinical coordinator. Patient's plan of care was discussed; medications were reviewed and discharge planning was addressed.      ________________________________________________________________________  Total NON critical care TIME:  50   Minutes    Total CRITICAL CARE TIME Spent:   Minutes non procedure based      Comments   >50% of visit spent in counseling and coordination of care     ________________________________________________________________________  Shelton Whelan MD     Procedures: see electronic medical records for all procedures/Xrays and details which were not copied into this note but were reviewed prior to creation of Plan. LABS:  I reviewed today's most current labs and imaging studies.   Pertinent labs include:  Recent Labs     06/09/19  0340 06/08/19  1752   WBC 9.9 12.9*   HGB 12.0 12.6   HCT 38.2 39.6    159     Recent Labs     06/09/19  0620 06/08/19  1752    135*   K 3.9 3.9   * 102   CO2 23 27   * 182*   BUN 11 13   CREA 0.77 0.93   CA 7.2* 8.2*   ALB  --  3.6   TBILI  --  0.6   SGOT  --  15   ALT  --  17       Signed: Brennen Brown MD

## 2019-06-09 NOTE — H&P
Hospitalist Admission Note    NAME: Patrice Melchor   :  1932   MRN:  [id number]     Date/Time:  2019 10:48 PM    Patient PCP: Vik Landaverde MD  ______________________________________________________________________  Given the patient's current clinical presentation, I have a high level of concern for decompensation if discharged from the emergency department. Complex decision making was performed, which includes reviewing the patient's available past medical records, laboratory results, and x-ray films. My assessment of this patient's clinical condition and my plan of care is as follows. Assessment / Plan:  Recurrent Nausea & vomiting, Abdominal Pain, Dizziness and Headaches  ? Etiology  ? Due to recurrent ileus  ? Due to recurrent UTI  ? R/o colon mass  ? Complex migraine  ? IBS  ? Symptomatic GERD/Gastritis  Recent MRI brain with and without contrast negative for tumor or CVA  Admit  CT A/P with Trace pleural effusions and bibasilar atelectasis. Trace pelvic free fluid.  No definite bowel obstruction with a borderline dilated fluid-filled small bowel loop in the left lower quadrant, nonspecific  IV rocephin and f/u urine cultures  NPO for now while awaiting consults  ED spoke to general surgery for evaluation  Will ask GI consult  IVF  May need HIDA depending on GI and surgery consults  PRN low dose IV fentanyl and IV Zofran for symptomatic management  IV PPIs    Leukocytosis without 2/4 SIRs/Sepsis criteria  Suspect due to UTI vs stress from above  IV rocephin and f/u urine cultures    Mild hyponatremia  Due to dehydration  IVF and monitor lytes    Hypercholesterolemia   Holding statins while NPO    Code Status: DNR/DNI as per her own wishes  Surrogate Decision Maker: Daughter    DVT Prophylaxis: SQ Heparin    Baseline: Functional, still drives      Subjective:   CHIEF COMPLAINT: Nausea, vomiting, diarrhea, abdominal pain, dizziness & headaches    HISTORY OF PRESENT ILLNESS:     Melvi Kevin is a 80 y.o.  female who presents with Nausea, vomiting, diarrhea, abdominal pain, dizziness & headaches. As per patient, she had similar episode few weeks ago and was seen in ED. Pt reported she started to feel better with prior episode but started to have symptoms again today. Pt reported 6/10 diffuse abdominal pain, aching in nature, intermittent started today with 5 episodes of nausea and vomiting. Pt reported headache diffuse all over the head started yesterday. She also reported dizziness. Pt claims symptoms are exactly are similar with previous episode. Pt denies any fever, chills, chest pain, shortness of breath, problems urination. She denies constipation and has been passing gas and reported regular bowel movements. In ED pt noted to have leukocytosis and CT A/P showed with Trace pleural effusions and bibasilar atelectasis. Trace pelvic free fluid. No definite bowel obstruction with a borderline dilated fluid-filled small bowel loop in the left lower quadrant, nonspecific    We were asked to admit for work up and evaluation of the above problems.      Past Medical History:   Diagnosis Date    Arthritis     GERD (gastroesophageal reflux disease)     Hx-TIA (transient ischemic attack) 2006    Hypercholesterolemia     Osteoporosis     Other ill-defined conditions(799.89)     high cholesterol    S/P colonoscopy 2001        Past Surgical History:   Procedure Laterality Date    HX ORTHOPAEDIC  2010    left hip replacement    AK COLONOSCOPY FLX DX W/COLLJ SPEC WHEN PFRMD  5/25/2012         STRESS TEST CARDIAC  2007       Social History     Tobacco Use    Smoking status: Never Smoker    Smokeless tobacco: Never Used   Substance Use Topics    Alcohol use: No        Family History   Problem Relation Age of Onset    Diabetes Mother     Hypertension Mother     Stroke Mother      Allergies   Allergen Reactions    Lipitor [Atorvastatin] Myalgia    Pravastatin Myalgia  Zetia [Ezetimibe] Other (comments)     itching        Prior to Admission medications    Medication Sig Start Date End Date Taking? Authorizing Provider   pravastatin (PRAVACHOL) 20 mg tablet TAKE 1 TABLET EVERY DAY  (  DOSE  LOWERED) 5/21/19  Yes Alfonso Pratt MD   meclizine (ANTIVERT) 25 mg tablet Take 1 Tab by mouth three (3) times daily as needed for Dizziness. 5/18/19  Yes Nikki Anderson MD   Cholecalciferol, Vitamin D3, (VITAMIN D3) 2,000 unit cap capsule Take 2,000 Units by mouth daily. 11/7/17  Yes Alfonso Pratt MD   ACETAMINOPHEN (TYLENOL PO) Take  by mouth as needed. Yes Provider, Historical   VIT C/VIT E/LUTEIN/MIN/OMEGA-3 (OCUVITE PO) Take  by mouth daily. Yes Provider, Historical   nitrofurantoin, macrocrystal-monohydrate, (MACROBID) 100 mg capsule Take 1 Cap by mouth two (2) times a day for 7 days. 5/31/19 6/7/19  Alfonso Pratt MD       REVIEW OF SYSTEMS:     I am not able to complete the review of systems because:    The patient is intubated and sedated    The patient has altered mental status due to his acute medical problems    The patient has baseline aphasia from prior stroke(s)    The patient has baseline dementia and is not reliable historian    The patient is in acute medical distress and unable to provide information           Total of 12 systems reviewed as follows:       POSITIVE= underlined text  Negative = text not underlined  General:  fever, chills, sweats, generalized weakness, weight loss/gain,      loss of appetite   Eyes:    blurred vision, eye pain, loss of vision, double vision  ENT:    rhinorrhea, pharyngitis   Respiratory:   cough, sputum production, SOB, SMITH, wheezing, pleuritic pain   Cardiology:   chest pain, palpitations, orthopnea, PND, edema, syncope   Gastrointestinal:  abdominal pain , N/V, diarrhea, dysphagia, constipation, bleeding   Genitourinary:  frequency, urgency, dysuria, hematuria, incontinence   Muskuloskeletal :  arthralgia, myalgia, back pain  Hematology:  easy bruising, nose or gum bleeding, lymphadenopathy   Dermatological: rash, ulceration, pruritis, color change / jaundice  Endocrine:   hot flashes or polydipsia   Neurological:  headache, dizziness, confusion, focal weakness, paresthesia,     Speech difficulties, memory loss, gait difficulty  Psychological: Feelings of anxiety, depression, agitation    Objective:   VITALS:    Visit Vitals  BP 94/52   Pulse 89   Temp 99.3 °F (37.4 °C)   Resp 16   Ht 5' 2\" (1.575 m)   Wt 47.9 kg (105 lb 9.6 oz)   SpO2 95%   BMI 19.31 kg/m²       PHYSICAL EXAM:    General:    Alert, cooperative, no distress, appears stated age. HEENT: Atraumatic, anicteric sclerae, pink conjunctivae     No oral ulcers, mucosa dry, throat clear, dentition fair  Neck:  Supple, symmetrical,  thyroid: non tender  Lungs:   Clear to auscultation bilaterally. No Wheezing or Rhonchi. No rales. Chest wall:  No tenderness  No Accessory muscle use. Heart:   Regular  rhythm,  No  murmur   No edema  Abdomen:   Soft, non-tender. Not distended. Bowel sounds normal  Extremities: No cyanosis. No clubbing,      Skin turgor normal, Capillary refill normal, Radial dial pulse 2+  Skin:     Not pale. Not Jaundiced  No rashes   Psych:  Good insight. Not depressed. Not anxious or agitated. Neurologic: EOMs intact. No facial asymmetry. No aphasia or slurred speech. Symmetrical strength, Sensation grossly intact.  Alert and oriented X 4.     _______________________________________________________________________  Care Plan discussed with:    Comments   Patient y    Family  y At bedside   RN y    Care Manager                    Consultant:      _______________________________________________________________________  Expected  Disposition:   Home with Family y   HH/PT/OT/RN    SNF/LTC    VAL    ________________________________________________________________________  TOTAL TIME: 61 Minutes    Critical Care Provided     Minutes non procedure based      Comments    y Reviewed previous records   >50% of visit spent in counseling and coordination of care y Discussion with patient and family and questions answered       ________________________________________________________________________  Signed: Jean Claude Cheung MD    Procedures: see electronic medical records for all procedures/Xrays and details which were not copied into this note but were reviewed prior to creation of Plan. LAB DATA REVIEWED:    Recent Results (from the past 24 hour(s))   CBC WITH AUTOMATED DIFF    Collection Time: 06/08/19  5:52 PM   Result Value Ref Range    WBC 12.9 (H) 3.6 - 11.0 K/uL    RBC 4.27 3.80 - 5.20 M/uL    HGB 12.6 11.5 - 16.0 g/dL    HCT 39.6 35.0 - 47.0 %    MCV 92.7 80.0 - 99.0 FL    MCH 29.5 26.0 - 34.0 PG    MCHC 31.8 30.0 - 36.5 g/dL    RDW 13.4 11.5 - 14.5 %    PLATELET 980 606 - 806 K/uL    MPV 9.9 8.9 - 12.9 FL    NRBC 0.0 0  WBC    ABSOLUTE NRBC 0.00 0.00 - 0.01 K/uL    NEUTROPHILS 93 (H) 32 - 75 %    LYMPHOCYTES 2 (L) 12 - 49 %    MONOCYTES 3 (L) 5 - 13 %    EOSINOPHILS 1 0 - 7 %    BASOPHILS 0 0 - 1 %    IMMATURE GRANULOCYTES 1 (H) 0.0 - 0.5 %    ABS. NEUTROPHILS 12.0 (H) 1.8 - 8.0 K/UL    ABS. LYMPHOCYTES 0.3 (L) 0.8 - 3.5 K/UL    ABS. MONOCYTES 0.4 0.0 - 1.0 K/UL    ABS. EOSINOPHILS 0.1 0.0 - 0.4 K/UL    ABS. BASOPHILS 0.0 0.0 - 0.1 K/UL    ABS. IMM.  GRANS. 0.1 (H) 0.00 - 0.04 K/UL    DF AUTOMATED      RBC COMMENTS NORMOCYTIC, NORMOCHROMIC     METABOLIC PANEL, COMPREHENSIVE    Collection Time: 06/08/19  5:52 PM   Result Value Ref Range    Sodium 135 (L) 136 - 145 mmol/L    Potassium 3.9 3.5 - 5.1 mmol/L    Chloride 102 97 - 108 mmol/L    CO2 27 21 - 32 mmol/L    Anion gap 6 5 - 15 mmol/L    Glucose 182 (H) 65 - 100 mg/dL    BUN 13 6 - 20 MG/DL    Creatinine 0.93 0.55 - 1.02 MG/DL    BUN/Creatinine ratio 14 12 - 20      GFR est AA >60 >60 ml/min/1.73m2    GFR est non-AA 57 (L) >60 ml/min/1.73m2    Calcium 8.2 (L) 8.5 - 10.1 MG/DL Bilirubin, total 0.6 0.2 - 1.0 MG/DL    ALT (SGPT) 17 12 - 78 U/L    AST (SGOT) 15 15 - 37 U/L    Alk.  phosphatase 55 45 - 117 U/L    Protein, total 7.1 6.4 - 8.2 g/dL    Albumin 3.6 3.5 - 5.0 g/dL    Globulin 3.5 2.0 - 4.0 g/dL    A-G Ratio 1.0 (L) 1.1 - 2.2     LIPASE    Collection Time: 06/08/19  5:52 PM   Result Value Ref Range    Lipase 68 (L) 73 - 393 U/L   TROPONIN I    Collection Time: 06/08/19  5:52 PM   Result Value Ref Range    Troponin-I, Qt. <0.05 <0.05 ng/mL   CK    Collection Time: 06/08/19  5:52 PM   Result Value Ref Range    CK 68 26 - 192 U/L   URINALYSIS W/ RFLX MICROSCOPIC    Collection Time: 06/08/19  9:00 PM   Result Value Ref Range    Color YELLOW/STRAW      Appearance CLOUDY (A) CLEAR      Specific gravity 1.024 1.003 - 1.030      pH (UA) 7.0 5.0 - 8.0      Protein 30 (A) NEG mg/dL    Glucose 100 (A) NEG mg/dL    Ketone TRACE (A) NEG mg/dL    Bilirubin NEGATIVE  NEG      Blood TRACE (A) NEG      Urobilinogen 1.0 0.2 - 1.0 EU/dL    Nitrites NEGATIVE  NEG      Leukocyte Esterase MODERATE (A) NEG      WBC 20-50 0 - 4 /hpf    RBC 0-5 0 - 5 /hpf    Epithelial cells FEW FEW /lpf    Bacteria NEGATIVE  NEG /hpf

## 2019-06-09 NOTE — PROGRESS NOTES
No IV line documented in flow doc. Notified Flavia zavala RN. This RN documented the line. Patient up to chair for breakfast.   1214 Patient still up in chair, giving self a sponge bath. Daughter is in with patient. Daughter would like to talk with Dr Damon Luu about procedure patient is having tomorrow and she has other questions. This RN had Dr Damon Luu paged. 1510 patient has not voided. This RN did a bladderscan : 465mL of urine. Paged Hospitalist. Order to straight cath. This RN went into patient room and patient had voided 400mL. Straight cath not needed at this time. 1823 patient voided 400 mL again. General Surgery End of Shift Nursing Note    Bedside shift change report given to Ivon Moore (oncoming nurse) by Win Pete (offgoing nurse). Report included the following information SBAR, Kardex and MAR. Shift worked:   7a-7p   Summary of shift:    See above   Issues for physician to address:   See above     Number times ambulated in hallway past shift: 0    Number of times OOB to chair past shift: 2    Pain Management:  Current medication: see mar  Patient states pain is manageable on current pain medication: YES    GI:    Current diet:  DIET FULL LIQUID  DIET NPO    Tolerating current diet: YES  Passing flatus: NO  Last Bowel Movement: several days ago   Appearance: brown    Respiratory:    Incentive Spirometer at bedside: YES  Patient instructed on use: YES    Patient Safety:    Falls Score: 1  Bed Alarm On? No  Sitter?  No    Rihcard Ha

## 2019-06-09 NOTE — ED NOTES
TRANSFER - OUT REPORT:    Verbal report given to Елена(name) on [facility]  being transferred to Gen Surg(unit) for routine progression of care       Report consisted of patients Situation, Background, Assessment and   Recommendations(SBAR). Information from the following report(s) SBAR, Kardex and Recent Results was reviewed with the receiving nurse. Lines:       Opportunity for questions and clarification was provided.       Patient transported with:   Registered Nurse

## 2019-06-09 NOTE — CONSULTS
Consult Date: 6/9/2019    Consults  Nonspecific GI symptoms, doubt SBO, may be viral or functional enteritis, but pt had similar sx preceeded by vertigo and general malaise, the CT findings of min bowel dilation are nonspecific. If sx were to persist, consider GI evaluation possible colonoscopy or Small bowel series, but no surgical or other rec for now,  Ok to advance diet full liquids and then ad denisa. Subjective   Pt admitted with sx vertigo and other and sec. Nonspecific lower abd pain and CT findings, non specific bowel pattern, no clear SBO or bowel mass or tumor.  Pt states very active but does have DJD and vertigo, no other lower or upper GI or  sx   CBC and lytes ok   Past Medical History:   Diagnosis Date    Arthritis     GERD (gastroesophageal reflux disease)     Hx-TIA (transient ischemic attack) 2006    Hypercholesterolemia     Osteoporosis     Other ill-defined conditions(799.89)     high cholesterol    S/P colonoscopy 2001      Past Surgical History:   Procedure Laterality Date    HX ORTHOPAEDIC  2010    left hip replacement    [facility] COLONOSCOPY FLX DX W/COLLJ SPEC WHEN PFRMD  5/25/2012         STRESS TEST CARDIAC  2007     Family History   Problem Relation Age of Onset    Diabetes Mother     Hypertension Mother     Stroke Mother       Social History     Tobacco Use    Smoking status: Never Smoker    Smokeless tobacco: Never Used   Substance Use Topics    Alcohol use: No       Current Facility-Administered Medications   Medication Dose Route Frequency Provider Last Rate Last Dose    0.9% sodium chloride infusion  75 mL/hr IntraVENous CONTINUOUS Magdy Cabral MD 75 mL/hr at 06/08/19 2336 75 mL/hr at 06/08/19 2336    fentaNYL citrate (PF) injection 6.5 mcg  6.5 mcg IntraVENous Q4H PRN Patricia Quiroga MD        acetaminophen (TYLENOL) suppository 650 mg  650 mg Rectal Q6H PRN Magdy Cabral MD        cefTRIAXone (ROCEPHIN) 1 g in 0.9% sodium chloride (MBP/ADV) 50 mL  1 g IntraVENous Q24H Jennifer Arias MD        sodium chloride (NS) flush 5-40 mL  5-40 mL IntraVENous Q8H Jennifer Arias MD   10 mL at 06/09/19 0617    sodium chloride (NS) flush 5-40 mL  5-40 mL IntraVENous PRN Jennifer Arias MD        acetaminophen (TYLENOL) tablet 650 mg  650 mg Oral Q6H PRN Magdy Cabral MD        ondansetron (ZOFRAN) injection 4 mg  4 mg IntraVENous Q4H PRN Jennifer Arias MD        heparin (porcine) injection 5,000 Units  5,000 Units SubCUTAneous Q8H Jennifer Arias MD   5,000 Units at 06/09/19 0617    pantoprazole (PROTONIX) 40 mg in sodium chloride 0.9% 10 mL injection  40 mg IntraVENous Q12H Jennifer Arias MD   40 mg at 06/08/19 2339        Review of Systems   Constitutional: Negative. HENT: Negative. Respiratory: Negative. Cardiovascular: Negative. Gastrointestinal: Negative. Min lower abdomen pain subjective    Genitourinary: Negative. Musculoskeletal: Positive for arthralgias, back pain and myalgias. Neurological: Negative. Psychiatric/Behavioral: Negative. All other systems reviewed and are negative. Objective     Vital signs for last 24 hours:  Visit Vitals  /44   Pulse 81   Temp 100.2 °F (37.9 °C)   Resp 17   Ht 5' 2\" (1.575 m)   Wt 47.9 kg (105 lb 9.6 oz)   SpO2 90%   BMI 19.31 kg/m²       Intake/Output this shift:  Current Shift: No intake/output data recorded.   Last 3 Shifts: 06/07 1901 - 06/09 0700  In: 1423.1 [I.V.:1423.1]  Out: -     Data Review:   Recent Results (from the past 24 hour(s))   CBC WITH AUTOMATED DIFF    Collection Time: 06/08/19  5:52 PM   Result Value Ref Range    WBC 12.9 (H) 3.6 - 11.0 K/uL    RBC 4.27 3.80 - 5.20 M/uL    HGB 12.6 11.5 - 16.0 g/dL    HCT 39.6 35.0 - 47.0 %    MCV 92.7 80.0 - 99.0 FL    MCH 29.5 26.0 - 34.0 PG    MCHC 31.8 30.0 - 36.5 g/dL    RDW 13.4 11.5 - 14.5 %    PLATELET 443 336 - 306 K/uL    MPV 9.9 8.9 - 12.9 FL    NRBC 0.0 0  WBC    ABSOLUTE NRBC 0.00 0.00 - 0.01 K/uL    NEUTROPHILS 93 (H) 32 - 75 % LYMPHOCYTES 2 (L) 12 - 49 %    MONOCYTES 3 (L) 5 - 13 %    EOSINOPHILS 1 0 - 7 %    BASOPHILS 0 0 - 1 %    IMMATURE GRANULOCYTES 1 (H) 0.0 - 0.5 %    ABS. NEUTROPHILS 12.0 (H) 1.8 - 8.0 K/UL    ABS. LYMPHOCYTES 0.3 (L) 0.8 - 3.5 K/UL    ABS. MONOCYTES 0.4 0.0 - 1.0 K/UL    ABS. EOSINOPHILS 0.1 0.0 - 0.4 K/UL    ABS. BASOPHILS 0.0 0.0 - 0.1 K/UL    ABS. IMM. GRANS. 0.1 (H) 0.00 - 0.04 K/UL    DF AUTOMATED      RBC COMMENTS NORMOCYTIC, NORMOCHROMIC     METABOLIC PANEL, COMPREHENSIVE    Collection Time: 06/08/19  5:52 PM   Result Value Ref Range    Sodium 135 (L) 136 - 145 mmol/L    Potassium 3.9 3.5 - 5.1 mmol/L    Chloride 102 97 - 108 mmol/L    CO2 27 21 - 32 mmol/L    Anion gap 6 5 - 15 mmol/L    Glucose 182 (H) 65 - 100 mg/dL    BUN 13 6 - 20 MG/DL    Creatinine 0.93 0.55 - 1.02 MG/DL    BUN/Creatinine ratio 14 12 - 20      GFR est AA >60 >60 ml/min/1.73m2    GFR est non-AA 57 (L) >60 ml/min/1.73m2    Calcium 8.2 (L) 8.5 - 10.1 MG/DL    Bilirubin, total 0.6 0.2 - 1.0 MG/DL    ALT (SGPT) 17 12 - 78 U/L    AST (SGOT) 15 15 - 37 U/L    Alk.  phosphatase 55 45 - 117 U/L    Protein, total 7.1 6.4 - 8.2 g/dL    Albumin 3.6 3.5 - 5.0 g/dL    Globulin 3.5 2.0 - 4.0 g/dL    A-G Ratio 1.0 (L) 1.1 - 2.2     LIPASE    Collection Time: 06/08/19  5:52 PM   Result Value Ref Range    Lipase 68 (L) 73 - 393 U/L   TROPONIN I    Collection Time: 06/08/19  5:52 PM   Result Value Ref Range    Troponin-I, Qt. <0.05 <0.05 ng/mL   CK    Collection Time: 06/08/19  5:52 PM   Result Value Ref Range    CK 68 26 - 192 U/L   URINALYSIS W/ RFLX MICROSCOPIC    Collection Time: 06/08/19  9:00 PM   Result Value Ref Range    Color YELLOW/STRAW      Appearance CLOUDY (A) CLEAR      Specific gravity 1.024 1.003 - 1.030      pH (UA) 7.0 5.0 - 8.0      Protein 30 (A) NEG mg/dL    Glucose 100 (A) NEG mg/dL    Ketone TRACE (A) NEG mg/dL    Bilirubin NEGATIVE  NEG      Blood TRACE (A) NEG      Urobilinogen 1.0 0.2 - 1.0 EU/dL    Nitrites NEGATIVE  NEG Leukocyte Esterase MODERATE (A) NEG      WBC 20-50 0 - 4 /hpf    RBC 0-5 0 - 5 /hpf    Epithelial cells FEW FEW /lpf    Bacteria NEGATIVE  NEG /hpf   LACTIC ACID    Collection Time: 06/08/19 10:27 PM   Result Value Ref Range    Lactic acid 0.9 0.4 - 2.0 MMOL/L   CBC WITH AUTOMATED DIFF    Collection Time: 06/09/19  3:40 AM   Result Value Ref Range    WBC 9.9 3.6 - 11.0 K/uL    RBC 4.09 3.80 - 5.20 M/uL    HGB 12.0 11.5 - 16.0 g/dL    HCT 38.2 35.0 - 47.0 %    MCV 93.4 80.0 - 99.0 FL    MCH 29.3 26.0 - 34.0 PG    MCHC 31.4 30.0 - 36.5 g/dL    RDW 13.8 11.5 - 14.5 %    PLATELET 374 756 - 798 K/uL    MPV 10.1 8.9 - 12.9 FL    NRBC 0.0 0  WBC    ABSOLUTE NRBC 0.00 0.00 - 0.01 K/uL    NEUTROPHILS 89 (H) 32 - 75 %    LYMPHOCYTES 3 (L) 12 - 49 %    MONOCYTES 5 5 - 13 %    EOSINOPHILS 3 0 - 7 %    BASOPHILS 0 0 - 1 %    IMMATURE GRANULOCYTES 0 0.0 - 0.5 %    ABS. NEUTROPHILS 8.8 (H) 1.8 - 8.0 K/UL    ABS. LYMPHOCYTES 0.3 (L) 0.8 - 3.5 K/UL    ABS. MONOCYTES 0.4 0.0 - 1.0 K/UL    ABS. EOSINOPHILS 0.3 0.0 - 0.4 K/UL    ABS. BASOPHILS 0.0 0.0 - 0.1 K/UL    ABS. IMM. GRANS. 0.0 0.00 - 0.04 K/UL    DF AUTOMATED     SAMPLES BEING HELD    Collection Time: 06/09/19  6:20 AM   Result Value Ref Range    SAMPLES BEING HELD PST     COMMENT        Add-on orders for these samples will be processed based on acceptable specimen integrity and analyte stability, which may vary by analyte.    METABOLIC PANEL, BASIC    Collection Time: 06/09/19  6:20 AM   Result Value Ref Range    Sodium 142 136 - 145 mmol/L    Potassium 3.9 3.5 - 5.1 mmol/L    Chloride 112 (H) 97 - 108 mmol/L    CO2 23 21 - 32 mmol/L    Anion gap 7 5 - 15 mmol/L    Glucose 102 (H) 65 - 100 mg/dL    BUN 11 6 - 20 MG/DL    Creatinine 0.77 0.55 - 1.02 MG/DL    BUN/Creatinine ratio 14 12 - 20      GFR est AA >60 >60 ml/min/1.73m2    GFR est non-AA >60 >60 ml/min/1.73m2    Calcium 7.2 (L) 8.5 - 10.1 MG/DL       Physical Exam   Constitutional: She is oriented to person, place, and time. She appears well-developed. No distress. Very thin frail appearing    Eyes: Conjunctivae are normal.   Cardiovascular: Normal rate and regular rhythm. Pulmonary/Chest: Effort normal and breath sounds normal.   Abdominal: Soft. Bowel sounds are normal. She exhibits no distension and no mass. There is no tenderness. There is no rebound and no guarding. Musculoskeletal: Normal range of motion. Neurological: She is alert and oriented to person, place, and time. Skin: Skin is warm and dry. Psychiatric: She has a normal mood and affect. Her behavior is normal. Judgment and thought content normal.   Nursing note and vitals reviewed.

## 2019-06-09 NOTE — CONSULTS
GI Consultation Note Cleopatrajewel Paredes)    Jose Manuel Guerrero : 1932 MRN: [id number]   ATTG: Preston Reyes MD PCP: Rome Angelucci, MD  Date/Time:  2019 7:40 AM  Subjective:   REASON FOR CONSULT:      Simona Louis is a 80 y.o.  female who I was asked to see for recurrent N/V, abdominal pain, and abnormal CT. She is admitied via the ER on presentation with recurrence of intermittent N/V, diarrhea, and generalized abdominal discomfort for which she was evaluated in ER 3 wks ago as well, associated then and now with generalized malaise. At that time she was treated with abx for presumed UTI and felt she was possibly improving, but noted progressive recurrence of emesis with at least 5 episodes reported by family on day of admission. Additionally she c/o concurrent headache, weakness, and dizziness. Her abdominal pain was described as diffuse intermittent \"aching\". She notes these symptoms are identical to her presentation 3 wks ago. She denies melena, BRBPR, hematochezia, hematemesis, dysphagia, diarrhea, constipation, change in appetite, change in bowel pattern, F/C, CP, SOB, palpitation, pain with PO, or relief with defecation. She denies all aggravating or ameliorating factors. ER eval here noted minimal leukocytosis (12.9) that is now normal at 9.9. No anemia noted. NL CMP. UA is suggestive of possible UTI. CT Abd/Pelvis notes trace pleural effusions and bibasilar atelectasis, trace pelvic free fluid, and no definite bowel obstruction with a borderline dilated fluid-filled small bowel loop in the left lower quadrant that is a nonspecific finding. .      She last underwent CRC screening as colonoscopy in  with normal study reported then.       Past Medical History:   Diagnosis Date    Arthritis     GERD (gastroesophageal reflux disease)     Hx-TIA (transient ischemic attack)     Hypercholesterolemia     Osteoporosis     Other ill-defined conditions(799.89)     high cholesterol    S/P colonoscopy 2001      Past Surgical History:   Procedure Laterality Date    HX ORTHOPAEDIC  2010    left hip replacement    [place] COLONOSCOPY FLX DX W/COLLJ SPEC WHEN PFRMD  5/25/2012         STRESS TEST CARDIAC  2007     Social History     Tobacco Use    Smoking status: Never Smoker    Smokeless tobacco: Never Used   Substance Use Topics    Alcohol use: No      Family History   Problem Relation Age of Onset    Diabetes Mother     Hypertension Mother     Stroke Mother       Allergies   Allergen Reactions    Lipitor [Atorvastatin] Myalgia    Pravastatin Myalgia    Zetia [Ezetimibe] Other (comments)     itching      Home Medications:  Prior to Admission Medications   Prescriptions Last Dose Informant Patient Reported? Taking? ACETAMINOPHEN (TYLENOL PO) 6/8/2019 at Unknown time  Yes Yes   Sig: Take  by mouth as needed. Cholecalciferol, Vitamin D3, (VITAMIN D3) 2,000 unit cap capsule 6/8/2019 at Unknown time  No Yes   Sig: Take 2,000 Units by mouth daily. VIT C/VIT E/LUTEIN/MIN/OMEGA-3 (OCUVITE PO) 6/8/2019 at Unknown time  Yes Yes   Sig: Take  by mouth daily. meclizine (ANTIVERT) 25 mg tablet 6/8/2019 at Unknown time  No Yes   Sig: Take 1 Tab by mouth three (3) times daily as needed for Dizziness. nitrofurantoin, macrocrystal-monohydrate, (MACROBID) 100 mg capsule   No No   Sig: Take 1 Cap by mouth two (2) times a day for 7 days.    pravastatin (PRAVACHOL) 20 mg tablet 6/8/2019 at Unknown time  No Yes   Sig: TAKE 1 TABLET EVERY DAY  (  DOSE  LOWERED)      Facility-Administered Medications: None     Hospital medications:  Current Facility-Administered Medications   Medication Dose Route Frequency    0.9% sodium chloride infusion  75 mL/hr IntraVENous CONTINUOUS    fentaNYL citrate (PF) injection 6.5 mcg  6.5 mcg IntraVENous Q4H PRN    acetaminophen (TYLENOL) suppository 650 mg  650 mg Rectal Q6H PRN    cefTRIAXone (ROCEPHIN) 1 g in 0.9% sodium chloride (MBP/ADV) 50 mL  1 g IntraVENous Q24H    sodium chloride (NS) flush 5-40 mL  5-40 mL IntraVENous Q8H    sodium chloride (NS) flush 5-40 mL  5-40 mL IntraVENous PRN    acetaminophen (TYLENOL) tablet 650 mg  650 mg Oral Q6H PRN    ondansetron (ZOFRAN) injection 4 mg  4 mg IntraVENous Q4H PRN    heparin (porcine) injection 5,000 Units  5,000 Units SubCUTAneous Q8H    pantoprazole (PROTONIX) 40 mg in sodium chloride 0.9% 10 mL injection  40 mg IntraVENous Q12H     REVIEW OF SYSTEMS:     [x]    Total of 11 systems reviewed as follows:  Const:   negative fever, negative chills, negative weight loss  Eyes:   negative diplopia or visual changes, negative eye pain  ENT:   negative coryza, negative sore throat  Resp:   negative cough, hemoptysis, dyspnea  Cards:   negative for chest pain, palpitations, lower extremity edema  :  negative for frequency, dysuria and hematuria  Skin:   negative for rash and pruritus  Heme:   negative for easy bruising and gum/nose bleeding  MS:  negative for myalgias, arthralgias, back pain and muscle weakness  Neurolo:  negative for dizziness, vertigo, memory problems   Psych:   negative for feelings of anxiety, depression     Pertinent Positives include : headaches, [place]    Objective:   VITALS:    Visit Vitals  /44   Pulse 81   Temp 100.2 °F (37.9 °C)   Resp 17   Ht 5' 2\" (1.575 m)   Wt 47.9 kg (105 lb 9.6 oz)   SpO2 90%   BMI 19.31 kg/m²     Temp (24hrs), Av.4 °F (37.4 °C), Min:98.4 °F (36.9 °C), Max:100.4 °F (38 °C)    PHYSICAL EXAM:   General:    Alert, cooperative, no distress, appears stated age. Head:   Normocephalic, without obvious abnormality, atraumatic. Eyes:   Conjunctivae clear, anicteric sclerae. Pupils are equal  Nose:  Nares normal. No drainage or sinus tenderness. Throat:    Lips, mucosa, and tongue normal.  No Thrush  Neck:  Supple, symmetrical,  no adenopathy, thyroid: non tender  Back:    Symmetric,  No CVA tenderness. Lungs:   CTA bilaterally. No wheezing/rhonchi/rales.   Chest wall:  No tenderness or deformity. No Accessory muscle use. Heart:   Regular rate and rhythm,  no murmur, rub or gallop. Abdomen:   Soft, non-tender. Not distended. Bowel sounds normal. No masses  Extremities: Atraumatic, No cyanosis. No edema. No clubbing  Skin:     Texture, turgor normal. No rashes/lesions/jaundice  Lymph:  Cervical, supraclavicular normal.  Psych:  Good insight. Not depressed. Not anxious or agitated. Neurologic: EOMs intact. No facial asymmetry/aphasia/slurred speech. Normal strength, A/O X 3. LAB DATA REVIEWED:    Recent Results (from the past 48 hour(s))   CBC WITH AUTOMATED DIFF    Collection Time: 06/08/19  5:52 PM   Result Value Ref Range    WBC 12.9 (H) 3.6 - 11.0 K/uL    RBC 4.27 3.80 - 5.20 M/uL    HGB 12.6 11.5 - 16.0 g/dL    HCT 39.6 35.0 - 47.0 %    MCV 92.7 80.0 - 99.0 FL    MCH 29.5 26.0 - 34.0 PG    MCHC 31.8 30.0 - 36.5 g/dL    RDW 13.4 11.5 - 14.5 %    PLATELET 357 402 - 878 K/uL    MPV 9.9 8.9 - 12.9 FL    NRBC 0.0 0  WBC    ABSOLUTE NRBC 0.00 0.00 - 0.01 K/uL    NEUTROPHILS 93 (H) 32 - 75 %    LYMPHOCYTES 2 (L) 12 - 49 %    MONOCYTES 3 (L) 5 - 13 %    EOSINOPHILS 1 0 - 7 %    BASOPHILS 0 0 - 1 %    IMMATURE GRANULOCYTES 1 (H) 0.0 - 0.5 %    ABS. NEUTROPHILS 12.0 (H) 1.8 - 8.0 K/UL    ABS. LYMPHOCYTES 0.3 (L) 0.8 - 3.5 K/UL    ABS. MONOCYTES 0.4 0.0 - 1.0 K/UL    ABS. EOSINOPHILS 0.1 0.0 - 0.4 K/UL    ABS. BASOPHILS 0.0 0.0 - 0.1 K/UL    ABS. IMM.  GRANS. 0.1 (H) 0.00 - 0.04 K/UL    DF AUTOMATED      RBC COMMENTS NORMOCYTIC, NORMOCHROMIC     METABOLIC PANEL, COMPREHENSIVE    Collection Time: 06/08/19  5:52 PM   Result Value Ref Range    Sodium 135 (L) 136 - 145 mmol/L    Potassium 3.9 3.5 - 5.1 mmol/L    Chloride 102 97 - 108 mmol/L    CO2 27 21 - 32 mmol/L    Anion gap 6 5 - 15 mmol/L    Glucose 182 (H) 65 - 100 mg/dL    BUN 13 6 - 20 MG/DL    Creatinine 0.93 0.55 - 1.02 MG/DL    BUN/Creatinine ratio 14 12 - 20      GFR est AA >60 >60 ml/min/1.73m2    GFR est non-AA 57 (L) >60 ml/min/1.73m2    Calcium 8.2 (L) 8.5 - 10.1 MG/DL    Bilirubin, total 0.6 0.2 - 1.0 MG/DL    ALT (SGPT) 17 12 - 78 U/L    AST (SGOT) 15 15 - 37 U/L    Alk. phosphatase 55 45 - 117 U/L    Protein, total 7.1 6.4 - 8.2 g/dL    Albumin 3.6 3.5 - 5.0 g/dL    Globulin 3.5 2.0 - 4.0 g/dL    A-G Ratio 1.0 (L) 1.1 - 2.2     LIPASE    Collection Time: 06/08/19  5:52 PM   Result Value Ref Range    Lipase 68 (L) 73 - 393 U/L   TROPONIN I    Collection Time: 06/08/19  5:52 PM   Result Value Ref Range    Troponin-I, Qt. <0.05 <0.05 ng/mL   CK    Collection Time: 06/08/19  5:52 PM   Result Value Ref Range    CK 68 26 - 192 U/L   URINALYSIS W/ RFLX MICROSCOPIC    Collection Time: 06/08/19  9:00 PM   Result Value Ref Range    Color YELLOW/STRAW      Appearance CLOUDY (A) CLEAR      Specific gravity 1.024 1.003 - 1.030      pH (UA) 7.0 5.0 - 8.0      Protein 30 (A) NEG mg/dL    Glucose 100 (A) NEG mg/dL    Ketone TRACE (A) NEG mg/dL    Bilirubin NEGATIVE  NEG      Blood TRACE (A) NEG      Urobilinogen 1.0 0.2 - 1.0 EU/dL    Nitrites NEGATIVE  NEG      Leukocyte Esterase MODERATE (A) NEG      WBC 20-50 0 - 4 /hpf    RBC 0-5 0 - 5 /hpf    Epithelial cells FEW FEW /lpf    Bacteria NEGATIVE  NEG /hpf   LACTIC ACID    Collection Time: 06/08/19 10:27 PM   Result Value Ref Range    Lactic acid 0.9 0.4 - 2.0 MMOL/L   CBC WITH AUTOMATED DIFF    Collection Time: 06/09/19  3:40 AM   Result Value Ref Range    WBC 9.9 3.6 - 11.0 K/uL    RBC 4.09 3.80 - 5.20 M/uL    HGB 12.0 11.5 - 16.0 g/dL    HCT 38.2 35.0 - 47.0 %    MCV 93.4 80.0 - 99.0 FL    MCH 29.3 26.0 - 34.0 PG    MCHC 31.4 30.0 - 36.5 g/dL    RDW 13.8 11.5 - 14.5 %    PLATELET 115 698 - 417 K/uL    MPV 10.1 8.9 - 12.9 FL    NRBC 0.0 0  WBC    ABSOLUTE NRBC 0.00 0.00 - 0.01 K/uL    NEUTROPHILS 89 (H) 32 - 75 %    LYMPHOCYTES 3 (L) 12 - 49 %    MONOCYTES 5 5 - 13 %    EOSINOPHILS 3 0 - 7 %    BASOPHILS 0 0 - 1 %    IMMATURE GRANULOCYTES 0 0.0 - 0.5 %    ABS.  NEUTROPHILS 8.8 (H) 1.8 - 8.0 K/UL    ABS. LYMPHOCYTES 0.3 (L) 0.8 - 3.5 K/UL    ABS. MONOCYTES 0.4 0.0 - 1.0 K/UL    ABS. EOSINOPHILS 0.3 0.0 - 0.4 K/UL    ABS. BASOPHILS 0.0 0.0 - 0.1 K/UL    ABS. IMM. GRANS. 0.0 0.00 - 0.04 K/UL    DF AUTOMATED     SAMPLES BEING HELD    Collection Time: 06/09/19  6:20 AM   Result Value Ref Range    SAMPLES BEING HELD PST     COMMENT        Add-on orders for these samples will be processed based on acceptable specimen integrity and analyte stability, which may vary by analyte. METABOLIC PANEL, BASIC    Collection Time: 06/09/19  6:20 AM   Result Value Ref Range    Sodium 142 136 - 145 mmol/L    Potassium 3.9 3.5 - 5.1 mmol/L    Chloride 112 (H) 97 - 108 mmol/L    CO2 23 21 - 32 mmol/L    Anion gap 7 5 - 15 mmol/L    Glucose 102 (H) 65 - 100 mg/dL    BUN 11 6 - 20 MG/DL    Creatinine 0.77 0.55 - 1.02 MG/DL    BUN/Creatinine ratio 14 12 - 20      GFR est AA >60 >60 ml/min/1.73m2    GFR est non-AA >60 >60 ml/min/1.73m2    Calcium 7.2 (L) 8.5 - 10.1 MG/DL     IMAGING RESULTS:   [x]      I have personally reviewed the actual   []    CXR  [x]    CT  []     US  CT ABD/PELVIS 6/8/19    FINDINGS:  LUNG BASES: Bibasilar atelectasis. Trace pleural effusions. LIVER: No mass or biliary dilatation. GALLBLADDER: Unremarkable. SPLEEN: No enlargement or lesion. PANCREAS: No mass or ductal dilatation. ADRENALS: No mass. KIDNEYS: No mass, calculus, or hydronephrosis. GI TRACT: Fluid-filled, borderline dilated small bowel loops left lower quadrant  without other dilated loops of bowel or evidence of obstruction. Stomach is  incompletely distended as oral contrast was not administered. PERITONEUM: No free air or free fluid. APPENDIX: Not well visualized. RETROPERITONEUM: No aortic aneurysm. LYMPH NODES: None enlarged. URINARY BLADDER: Unremarkable. REPRODUCTIVE ORGANS: Unremarkable. LYMPH NODES: None enlarged. FREE FLUID: Trace. BONES: Left hip arthroplasty. IMPRESSION:  1.  Trace pleural effusions and bibasilar atelectasis. 2. Trace pelvic free fluid. 3. No definite bowel obstruction with a borderline dilated fluid-filled small  bowel loop in the left lower quadrant, nonspecific. Recommendations/Plan:      Active Problems:    Hypercholesterolemia ()      Nausea & vomiting (6/8/2019)       ___________________________________________________  RECOMMENDATIONS:    79yo F fawad N/V and generalized abd pain. No clear evidence of SBO or ileus on imaging. Suspect concurrent UTI. Exclusion of inflammation or mass warranted, but pt is not anemic.   Sx are not clearly meal mediated, making biliary colic less likely and LFTs are normal  Plan:  1) Allow diet and advance as tolerated  2) NPO after MN for possible EGD tomorrow  3) EGD tomorrow in late morning  4) Continue abx unchanged for now  5) IVF  6) IV PPI  7) PRN antinausea/antiemetic agents  8) Consider for US or later HIDA with CCK if sx persist beyond treatment of her UTI    Discussed Code Status:    []    Full Code      [x]    DNR    ___________________________________________________  Care Plan discussed with:    [x]    Patient   []    Family   [x]    Nursing   [x]    Attending  Total Time :  50   minutes   ___________________________________________________  GI: Ivan Samuel MD

## 2019-06-09 NOTE — ED PROVIDER NOTES
EMERGENCY DEPARTMENT HISTORY AND PHYSICAL EXAM      Date: 2019  Patient Name: Yordan Pedrzaa    History of Presenting Illness     Chief Complaint   Patient presents with    Nausea    Headache     intermittent times three weeks: per pt's daughter pt had an MRI    Vomiting     began today        History Provided By: Patient    HPI: Yordan Pedraza, 80 y.o. female with PMHx significant for TIA and reflux, presents    to the ED with cc of headache, nausea and vomiting. The patient has had headaches off and on x3 weeks. She was seen in the ER previously for the headaches, and was diagnosed with a UTI. Her headache reoccurred today, and is associated with vomiting x5. The patient also complains of upper abdominal discomfort, which is of moderate severity. She denies back pain, dysuria, fever or chills. She denies any trauma to her head, and denies neck pain. She also denies numbness or tingling and diarrhea. There are no other complaints, changes, or physical findings at this time. PCP: Shanda Vera MD    No current facility-administered medications on file prior to encounter. Current Outpatient Medications on File Prior to Encounter   Medication Sig Dispense Refill    pravastatin (PRAVACHOL) 20 mg tablet TAKE 1 TABLET EVERY DAY  (  DOSE  LOWERED) 30 Tab 4    meclizine (ANTIVERT) 25 mg tablet Take 1 Tab by mouth three (3) times daily as needed for Dizziness. 20 Tab 0    Cholecalciferol, Vitamin D3, (VITAMIN D3) 2,000 unit cap capsule Take 2,000 Units by mouth daily. 90 Cap 4    ACETAMINOPHEN (TYLENOL PO) Take  by mouth as needed.  VIT C/VIT E/LUTEIN/MIN/OMEGA-3 (OCUVITE PO) Take  by mouth daily.  [] nitrofurantoin, macrocrystal-monohydrate, (MACROBID) 100 mg capsule Take 1 Cap by mouth two (2) times a day for 7 days.  14 Cap 0       Past History     Past Medical History:  Past Medical History:   Diagnosis Date    Arthritis     GERD (gastroesophageal reflux disease)     Hx-TIA (transient ischemic attack) 2006    Hypercholesterolemia     Osteoporosis     Other ill-defined conditions(799.89)     high cholesterol    S/P colonoscopy 2001       Past Surgical History:  Past Surgical History:   Procedure Laterality Date    HX ORTHOPAEDIC  2010    left hip replacement    MD COLONOSCOPY FLX DX W/COLLJ SPEC WHEN PFRMD  5/25/2012         STRESS TEST CARDIAC  2007       Family History:  Family History   Problem Relation Age of Onset    Diabetes Mother     Hypertension Mother     Stroke Mother        Social History:  Social History     Tobacco Use    Smoking status: Never Smoker    Smokeless tobacco: Never Used   Substance Use Topics    Alcohol use: No    Drug use: No       Allergies: Allergies   Allergen Reactions    Lipitor [Atorvastatin] Myalgia    Pravastatin Myalgia    Zetia [Ezetimibe] Other (comments)     itching         Review of Systems   Review of Systems   Constitutional: Negative for chills and fever. HENT: Negative for congestion. Eyes: Negative. Respiratory: Negative for shortness of breath. Cardiovascular: Positive for chest pain. Gastrointestinal: Positive for abdominal pain. Endocrine: Negative for polyuria. Genitourinary: Negative for dysuria. Musculoskeletal: Negative for back pain. Skin: Negative. Allergic/Immunologic: Negative for immunocompromised state. Neurological: Positive for light-headedness and headaches. Hematological: Negative. Psychiatric/Behavioral: Negative. All other systems reviewed and are negative. Physical Exam   Physical Exam   Constitutional: She is oriented to person, place, and time. She appears well-developed and well-nourished. No distress. HENT:   Head: Normocephalic and atraumatic. Eyes: Pupils are equal, round, and reactive to light. EOM are normal.   Neck: Normal range of motion. Neck supple.    No cervical tenderness   Cardiovascular: Normal rate, regular rhythm, normal heart sounds and intact distal pulses. Pulmonary/Chest: Effort normal and breath sounds normal. No respiratory distress. She has no wheezes. She exhibits tenderness. Abdominal: Soft. Bowel sounds are normal. She exhibits no mass. There is tenderness. Upper quadrants greater than lower quadrant tenderness   Musculoskeletal: Normal range of motion. She exhibits no edema or tenderness. Neurological: She is alert and oriented to person, place, and time. Coordination normal.   Skin: Skin is warm and dry. Psychiatric: She has a normal mood and affect. Her behavior is normal.   Nursing note and vitals reviewed. Diagnostic Study Results     Labs -     Recent Results (from the past 12 hour(s))   CBC WITH AUTOMATED DIFF    Collection Time: 06/08/19  5:52 PM   Result Value Ref Range    WBC 12.9 (H) 3.6 - 11.0 K/uL    RBC 4.27 3.80 - 5.20 M/uL    HGB 12.6 11.5 - 16.0 g/dL    HCT 39.6 35.0 - 47.0 %    MCV 92.7 80.0 - 99.0 FL    MCH 29.5 26.0 - 34.0 PG    MCHC 31.8 30.0 - 36.5 g/dL    RDW 13.4 11.5 - 14.5 %    PLATELET 394 146 - 497 K/uL    MPV 9.9 8.9 - 12.9 FL    NRBC 0.0 0  WBC    ABSOLUTE NRBC 0.00 0.00 - 0.01 K/uL    NEUTROPHILS 93 (H) 32 - 75 %    LYMPHOCYTES 2 (L) 12 - 49 %    MONOCYTES 3 (L) 5 - 13 %    EOSINOPHILS 1 0 - 7 %    BASOPHILS 0 0 - 1 %    IMMATURE GRANULOCYTES 1 (H) 0.0 - 0.5 %    ABS. NEUTROPHILS 12.0 (H) 1.8 - 8.0 K/UL    ABS. LYMPHOCYTES 0.3 (L) 0.8 - 3.5 K/UL    ABS. MONOCYTES 0.4 0.0 - 1.0 K/UL    ABS. EOSINOPHILS 0.1 0.0 - 0.4 K/UL    ABS. BASOPHILS 0.0 0.0 - 0.1 K/UL    ABS. IMM.  GRANS. 0.1 (H) 0.00 - 0.04 K/UL    DF AUTOMATED      RBC COMMENTS NORMOCYTIC, NORMOCHROMIC     METABOLIC PANEL, COMPREHENSIVE    Collection Time: 06/08/19  5:52 PM   Result Value Ref Range    Sodium 135 (L) 136 - 145 mmol/L    Potassium 3.9 3.5 - 5.1 mmol/L    Chloride 102 97 - 108 mmol/L    CO2 27 21 - 32 mmol/L    Anion gap 6 5 - 15 mmol/L    Glucose 182 (H) 65 - 100 mg/dL    BUN 13 6 - 20 MG/DL    Creatinine 0.93 0.55 - 1.02 MG/DL    BUN/Creatinine ratio 14 12 - 20      GFR est AA >60 >60 ml/min/1.73m2    GFR est non-AA 57 (L) >60 ml/min/1.73m2    Calcium 8.2 (L) 8.5 - 10.1 MG/DL    Bilirubin, total 0.6 0.2 - 1.0 MG/DL    ALT (SGPT) 17 12 - 78 U/L    AST (SGOT) 15 15 - 37 U/L    Alk. phosphatase 55 45 - 117 U/L    Protein, total 7.1 6.4 - 8.2 g/dL    Albumin 3.6 3.5 - 5.0 g/dL    Globulin 3.5 2.0 - 4.0 g/dL    A-G Ratio 1.0 (L) 1.1 - 2.2     LIPASE    Collection Time: 06/08/19  5:52 PM   Result Value Ref Range    Lipase 68 (L) 73 - 393 U/L   TROPONIN I    Collection Time: 06/08/19  5:52 PM   Result Value Ref Range    Troponin-I, Qt. <0.05 <0.05 ng/mL   CK    Collection Time: 06/08/19  5:52 PM   Result Value Ref Range    CK 68 26 - 192 U/L   URINALYSIS W/ RFLX MICROSCOPIC    Collection Time: 06/08/19  9:00 PM   Result Value Ref Range    Color YELLOW/STRAW      Appearance CLOUDY (A) CLEAR      Specific gravity 1.024 1.003 - 1.030      pH (UA) 7.0 5.0 - 8.0      Protein 30 (A) NEG mg/dL    Glucose 100 (A) NEG mg/dL    Ketone TRACE (A) NEG mg/dL    Bilirubin NEGATIVE  NEG      Blood TRACE (A) NEG      Urobilinogen 1.0 0.2 - 1.0 EU/dL    Nitrites NEGATIVE  NEG      Leukocyte Esterase MODERATE (A) NEG      WBC 20-50 0 - 4 /hpf    RBC 0-5 0 - 5 /hpf    Epithelial cells FEW FEW /lpf    Bacteria NEGATIVE  NEG /hpf       Radiologic Studies -   US ABD LTD   Final Result   IMPRESSION: Unremarkable right upper quadrant sonogram.         CT ABD PELV W CONT   Final Result   IMPRESSION:      1. Trace pleural effusions and bibasilar atelectasis. 2. Trace pelvic free fluid. 3. No definite bowel obstruction with a borderline dilated fluid-filled small   bowel loop in the left lower quadrant, nonspecific. XR ABD ACUTE W 1 V CHEST   Final Result   IMPRESSION:   1.  No acute disease           CT Results  (Last 48 hours)    None        CXR Results  (Last 48 hours)               06/08/19 1943  XR ABD ACUTE W 1 V CHEST Final result    Impression: IMPRESSION:   1. No acute disease           Narrative:  INDICATION:  pain        EXAM: Frontal view of the chest and 2 views of the abdomen. Comparisons: May 18, 2020        FINDINGS: The cardiomediastinal silhouette is normal. Pulmonary vasculature is   not engorged. No pneumothorax, focal parenchymal opacity or effusion. Chronic   appearing interstitial changes at the lung bases. There is no free air. Bowel   gas pattern is normal. There is prominent stool within the colon No abnormal   calcifications. Medical Decision Making   I am the first provider for this patient. I reviewed the vital signs, available nursing notes, past medical history, past surgical history, family history and social history. Vital Signs-Reviewed the patient's vital signs. Patient Vitals for the past 12 hrs:   Temp Pulse Resp BP SpO2   06/08/19 2000    102/44 95 %   06/08/19 1900    116/51 96 %   06/08/19 1845    115/51 97 %   06/08/19 1800    122/58 98 %   06/08/19 1743    118/64    06/08/19 1714 99.3 °F (37.4 °C) 89 16 109/79 99 %       Pulse Oximetry Analysis - 99% on room air    Cardiac Monitor:   Rate: 89 bpm  Rhythm: Normal Sinus Rhythm        . Records Reviewed: Nursing Notes, Old Medical Records, Previous Radiology Studies and Previous Laboratory Studies    Provider Notes (Medical Decision Making):   Tension headache, dehydration, UTI, electrolyte abnormality, gastritis, obstruction, pancreatitis    ED Course:   Initial assessment performed. The patients presenting problems have been discussed, and they are in agreement with the care plan formulated and outlined with them. I have encouraged them to ask questions as they arise throughout their visit. Progress note: The patient's headache went away with Tylenol. She still has a 5 out of 10 abdominal pain. Consult note: The patient's blood pressure has been trending down.   I discussed her case with Dr. Anahi Taveras, hospitalist.  He would like a surgery consult. Consult note: The case was discussed with Dr. Herrera Thibodeaux, general surgery. He does not believe the patient has a small bowel obstruction. Consult note: The patient is being admitted by Dr. Maude Dior, hospitalist  Critical Care Time:   0    Disposition:  admit    PLAN:  1. Current Discharge Medication List        2. Follow-up Information    None       Return to ED if worse     Diagnosis     Clinical Impression:   1. Acute cystitis with hematuria    2. Nausea and vomiting, intractability of vomiting not specified, unspecified vomiting type    3. Abnormal CT of the abdomen    4. Dehydration    5. Tension headache        Attestations:     This chart was completed by myself, Dr. Andria Bowles

## 2019-06-10 ENCOUNTER — ANESTHESIA EVENT (OUTPATIENT)
Dept: ENDOSCOPY | Age: 84
DRG: 641 | End: 2019-06-10
Payer: MEDICARE

## 2019-06-10 ENCOUNTER — APPOINTMENT (OUTPATIENT)
Dept: ULTRASOUND IMAGING | Age: 84
DRG: 641 | End: 2019-06-10
Attending: INTERNAL MEDICINE
Payer: MEDICARE

## 2019-06-10 ENCOUNTER — ANESTHESIA (OUTPATIENT)
Dept: ENDOSCOPY | Age: 84
DRG: 641 | End: 2019-06-10
Payer: MEDICARE

## 2019-06-10 LAB
ANION GAP SERPL CALC-SCNC: 6 MMOL/L (ref 5–15)
BASOPHILS # BLD: 0 K/UL (ref 0–0.1)
BASOPHILS NFR BLD: 0 % (ref 0–1)
BUN SERPL-MCNC: 9 MG/DL (ref 6–20)
BUN/CREAT SERPL: 13 (ref 12–20)
CALCIUM SERPL-MCNC: 7.4 MG/DL (ref 8.5–10.1)
CHLORIDE SERPL-SCNC: 112 MMOL/L (ref 97–108)
CO2 SERPL-SCNC: 23 MMOL/L (ref 21–32)
CREAT SERPL-MCNC: 0.72 MG/DL (ref 0.55–1.02)
DIFFERENTIAL METHOD BLD: ABNORMAL
EOSINOPHIL # BLD: 0.5 K/UL (ref 0–0.4)
EOSINOPHIL NFR BLD: 10 % (ref 0–7)
ERYTHROCYTE [DISTWIDTH] IN BLOOD BY AUTOMATED COUNT: 13.8 % (ref 11.5–14.5)
GLUCOSE SERPL-MCNC: 94 MG/DL (ref 65–100)
HCT VFR BLD AUTO: 33.6 % (ref 35–47)
HGB BLD-MCNC: 10.7 G/DL (ref 11.5–16)
IMM GRANULOCYTES # BLD AUTO: 0 K/UL (ref 0–0.04)
IMM GRANULOCYTES NFR BLD AUTO: 0 % (ref 0–0.5)
LYMPHOCYTES # BLD: 0.6 K/UL (ref 0.8–3.5)
LYMPHOCYTES NFR BLD: 13 % (ref 12–49)
MCH RBC QN AUTO: 29.6 PG (ref 26–34)
MCHC RBC AUTO-ENTMCNC: 31.8 G/DL (ref 30–36.5)
MCV RBC AUTO: 92.8 FL (ref 80–99)
MONOCYTES # BLD: 0.4 K/UL (ref 0–1)
MONOCYTES NFR BLD: 9 % (ref 5–13)
NEUTS SEG # BLD: 3.1 K/UL (ref 1.8–8)
NEUTS SEG NFR BLD: 68 % (ref 32–75)
NRBC # BLD: 0 K/UL (ref 0–0.01)
NRBC BLD-RTO: 0 PER 100 WBC
PLATELET # BLD AUTO: 123 K/UL (ref 150–400)
PMV BLD AUTO: 10.3 FL (ref 8.9–12.9)
POTASSIUM SERPL-SCNC: 3.6 MMOL/L (ref 3.5–5.1)
RBC # BLD AUTO: 3.62 M/UL (ref 3.8–5.2)
SODIUM SERPL-SCNC: 141 MMOL/L (ref 136–145)
WBC # BLD AUTO: 4.6 K/UL (ref 3.6–11)

## 2019-06-10 PROCEDURE — 76705 ECHO EXAM OF ABDOMEN: CPT

## 2019-06-10 PROCEDURE — 74011250636 HC RX REV CODE- 250/636: Performed by: INTERNAL MEDICINE

## 2019-06-10 PROCEDURE — 76040000019: Performed by: INTERNAL MEDICINE

## 2019-06-10 PROCEDURE — 74011000250 HC RX REV CODE- 250

## 2019-06-10 PROCEDURE — 74011250636 HC RX REV CODE- 250/636

## 2019-06-10 PROCEDURE — 85025 COMPLETE CBC W/AUTO DIFF WBC: CPT

## 2019-06-10 PROCEDURE — 80048 BASIC METABOLIC PNL TOTAL CA: CPT

## 2019-06-10 PROCEDURE — 74011000250 HC RX REV CODE- 250: Performed by: INTERNAL MEDICINE

## 2019-06-10 PROCEDURE — 77030019988 HC FCPS ENDOSC DISP BSC -B: Performed by: INTERNAL MEDICINE

## 2019-06-10 PROCEDURE — 88305 TISSUE EXAM BY PATHOLOGIST: CPT

## 2019-06-10 PROCEDURE — 0DB68ZX EXCISION OF STOMACH, VIA NATURAL OR ARTIFICIAL OPENING ENDOSCOPIC, DIAGNOSTIC: ICD-10-PCS | Performed by: INTERNAL MEDICINE

## 2019-06-10 PROCEDURE — 76060000031 HC ANESTHESIA FIRST 0.5 HR: Performed by: INTERNAL MEDICINE

## 2019-06-10 PROCEDURE — C9113 INJ PANTOPRAZOLE SODIUM, VIA: HCPCS | Performed by: INTERNAL MEDICINE

## 2019-06-10 PROCEDURE — 0DB48ZX EXCISION OF ESOPHAGOGASTRIC JUNCTION, VIA NATURAL OR ARTIFICIAL OPENING ENDOSCOPIC, DIAGNOSTIC: ICD-10-PCS | Performed by: INTERNAL MEDICINE

## 2019-06-10 PROCEDURE — 65660000000 HC RM CCU STEPDOWN

## 2019-06-10 PROCEDURE — 74011250637 HC RX REV CODE- 250/637: Performed by: INTERNAL MEDICINE

## 2019-06-10 PROCEDURE — 36415 COLL VENOUS BLD VENIPUNCTURE: CPT

## 2019-06-10 RX ORDER — FENTANYL CITRATE 50 UG/ML
25 INJECTION, SOLUTION INTRAMUSCULAR; INTRAVENOUS
Status: DISCONTINUED | OUTPATIENT
Start: 2019-06-10 | End: 2019-06-10 | Stop reason: HOSPADM

## 2019-06-10 RX ORDER — SODIUM CHLORIDE 9 MG/ML
75 INJECTION, SOLUTION INTRAVENOUS CONTINUOUS
Status: DISPENSED | OUTPATIENT
Start: 2019-06-10 | End: 2019-06-10

## 2019-06-10 RX ORDER — MIDAZOLAM HYDROCHLORIDE 1 MG/ML
1-2 INJECTION, SOLUTION INTRAMUSCULAR; INTRAVENOUS
Status: DISCONTINUED | OUTPATIENT
Start: 2019-06-10 | End: 2019-06-10 | Stop reason: HOSPADM

## 2019-06-10 RX ORDER — DEXTROMETHORPHAN/PSEUDOEPHED 2.5-7.5/.8
1.2 DROPS ORAL
Status: DISCONTINUED | OUTPATIENT
Start: 2019-06-10 | End: 2019-06-10 | Stop reason: HOSPADM

## 2019-06-10 RX ORDER — SODIUM CHLORIDE 0.9 % (FLUSH) 0.9 %
5-40 SYRINGE (ML) INJECTION EVERY 8 HOURS
Status: DISCONTINUED | OUTPATIENT
Start: 2019-06-10 | End: 2019-06-11 | Stop reason: HOSPADM

## 2019-06-10 RX ORDER — GLYCOPYRROLATE 0.2 MG/ML
INJECTION INTRAMUSCULAR; INTRAVENOUS AS NEEDED
Status: DISCONTINUED | OUTPATIENT
Start: 2019-06-10 | End: 2019-06-10 | Stop reason: HOSPADM

## 2019-06-10 RX ORDER — PROPOFOL 10 MG/ML
INJECTION, EMULSION INTRAVENOUS AS NEEDED
Status: DISCONTINUED | OUTPATIENT
Start: 2019-06-10 | End: 2019-06-10 | Stop reason: HOSPADM

## 2019-06-10 RX ORDER — NALOXONE HYDROCHLORIDE 0.4 MG/ML
0.4 INJECTION, SOLUTION INTRAMUSCULAR; INTRAVENOUS; SUBCUTANEOUS
Status: DISCONTINUED | OUTPATIENT
Start: 2019-06-10 | End: 2019-06-10 | Stop reason: HOSPADM

## 2019-06-10 RX ORDER — FLUMAZENIL 0.1 MG/ML
0.2 INJECTION INTRAVENOUS
Status: DISCONTINUED | OUTPATIENT
Start: 2019-06-10 | End: 2019-06-10 | Stop reason: HOSPADM

## 2019-06-10 RX ORDER — SODIUM CHLORIDE 0.9 % (FLUSH) 0.9 %
5-40 SYRINGE (ML) INJECTION AS NEEDED
Status: DISCONTINUED | OUTPATIENT
Start: 2019-06-10 | End: 2019-06-11 | Stop reason: HOSPADM

## 2019-06-10 RX ORDER — ATROPINE SULFATE 0.1 MG/ML
0.5 INJECTION INTRAVENOUS
Status: DISCONTINUED | OUTPATIENT
Start: 2019-06-10 | End: 2019-06-10 | Stop reason: HOSPADM

## 2019-06-10 RX ORDER — EPINEPHRINE 0.1 MG/ML
1 INJECTION INTRACARDIAC; INTRAVENOUS
Status: DISCONTINUED | OUTPATIENT
Start: 2019-06-10 | End: 2019-06-10 | Stop reason: HOSPADM

## 2019-06-10 RX ORDER — LIDOCAINE HYDROCHLORIDE 20 MG/ML
INJECTION, SOLUTION EPIDURAL; INFILTRATION; INTRACAUDAL; PERINEURAL AS NEEDED
Status: DISCONTINUED | OUTPATIENT
Start: 2019-06-10 | End: 2019-06-10 | Stop reason: HOSPADM

## 2019-06-10 RX ORDER — MIDAZOLAM HYDROCHLORIDE 1 MG/ML
.25-5 INJECTION, SOLUTION INTRAMUSCULAR; INTRAVENOUS
Status: DISCONTINUED | OUTPATIENT
Start: 2019-06-10 | End: 2019-06-10 | Stop reason: HOSPADM

## 2019-06-10 RX ADMIN — SODIUM CHLORIDE: 900 INJECTION, SOLUTION INTRAVENOUS at 10:37

## 2019-06-10 RX ADMIN — Medication 10 ML: at 18:02

## 2019-06-10 RX ADMIN — Medication 10 ML: at 12:48

## 2019-06-10 RX ADMIN — PROPOFOL 30 MG: 10 INJECTION, EMULSION INTRAVENOUS at 10:39

## 2019-06-10 RX ADMIN — ACETAMINOPHEN 650 MG: 325 TABLET ORAL at 23:30

## 2019-06-10 RX ADMIN — HEPARIN SODIUM 5000 UNITS: 5000 INJECTION INTRAVENOUS; SUBCUTANEOUS at 18:01

## 2019-06-10 RX ADMIN — SODIUM CHLORIDE 40 MG: 9 INJECTION, SOLUTION INTRAMUSCULAR; INTRAVENOUS; SUBCUTANEOUS at 08:20

## 2019-06-10 RX ADMIN — Medication 10 ML: at 14:20

## 2019-06-10 RX ADMIN — HEPARIN SODIUM 5000 UNITS: 5000 INJECTION INTRAVENOUS; SUBCUTANEOUS at 22:05

## 2019-06-10 RX ADMIN — SODIUM CHLORIDE 75 ML/HR: 900 INJECTION, SOLUTION INTRAVENOUS at 09:59

## 2019-06-10 RX ADMIN — SODIUM CHLORIDE 75 ML/HR: 900 INJECTION, SOLUTION INTRAVENOUS at 12:48

## 2019-06-10 RX ADMIN — Medication 10 ML: at 22:05

## 2019-06-10 RX ADMIN — GLYCOPYRROLATE 0.2 MG: 0.2 INJECTION INTRAMUSCULAR; INTRAVENOUS at 10:39

## 2019-06-10 RX ADMIN — HEPARIN SODIUM 5000 UNITS: 5000 INJECTION INTRAVENOUS; SUBCUTANEOUS at 05:32

## 2019-06-10 RX ADMIN — SODIUM CHLORIDE 75 ML/HR: 900 INJECTION, SOLUTION INTRAVENOUS at 05:33

## 2019-06-10 RX ADMIN — SODIUM CHLORIDE 40 MG: 9 INJECTION, SOLUTION INTRAMUSCULAR; INTRAVENOUS; SUBCUTANEOUS at 22:04

## 2019-06-10 RX ADMIN — LIDOCAINE HYDROCHLORIDE 50 MG: 20 INJECTION, SOLUTION EPIDURAL; INFILTRATION; INTRACAUDAL; PERINEURAL at 10:39

## 2019-06-10 RX ADMIN — Medication 10 ML: at 05:33

## 2019-06-10 NOTE — PERIOP NOTES
TRANSFER - OUT REPORT:    Verbal report given to Lisa(name) on Sharon Bolanos  being transferred to 211(unit) for routine progression of care       Report consisted of patients Situation, Background, Assessment and   Recommendations(SBAR). Information from the following report(s) Procedure Summary was reviewed with the receiving nurse. Lines:   Peripheral IV Left Antecubital (Active)   Site Assessment Clean, dry, & intact 6/10/2019 10:36 AM   Phlebitis Assessment 0 6/10/2019 10:36 AM   Infiltration Assessment 0 6/10/2019 10:36 AM   Dressing Status Clean, dry, & intact 6/10/2019 10:36 AM   Dressing Type Tape;Transparent 6/10/2019 10:36 AM   Hub Color/Line Status Pink 6/10/2019 10:36 AM        Opportunity for questions and clarification was provided.       Patient transported with:

## 2019-06-10 NOTE — PERIOP NOTES
Anesthesia reports 60mg Propofol, 50mg Lidocaine and 100mL NS, 0.2 mg robinul given during procedure. Received report from anesthesia staff on vital signs and status of patient.

## 2019-06-10 NOTE — ANESTHESIA POSTPROCEDURE EVALUATION
Procedure(s):  ESOPHAGOGASTRODUODENOSCOPY (EGD)  ESOPHAGOGASTRODUODENAL (EGD) BIOPSY. total IV anesthesia, general    Anesthesia Post Evaluation        Patient location during evaluation: PACU  Note status: Adequate. Level of consciousness: responsive to verbal stimuli and sleepy but conscious  Pain management: satisfactory to patient  Airway patency: patent  Anesthetic complications: no  Cardiovascular status: acceptable  Respiratory status: acceptable  Hydration status: acceptable  Comments: +Post-Anesthesia Evaluation and Assessment    Patient: Queta Hinojosa MRN: [id number]  SSN: [id number]   YOB: 1932  Age: 80 y.o. Sex: female      Cardiovascular Function/Vital Signs    /70   Pulse 72   Temp 36.6 °C (97.8 °F)   Resp 16   Ht 5' 2\" (1.575 m)   Wt 47.9 kg (105 lb 9.6 oz)   SpO2 96%   Breastfeeding? No   BMI 19.31 kg/m²     Patient is status post Procedure(s):  ESOPHAGOGASTRODUODENOSCOPY (EGD)  ESOPHAGOGASTRODUODENAL (EGD) BIOPSY. Nausea/Vomiting: Controlled. Postoperative hydration reviewed and adequate. Pain:  Pain Scale 1: Visual (06/10/19 1104)  Pain Intensity 1: 0 (06/10/19 1104)   Managed. Neurological Status: At baseline. Mental Status and Level of Consciousness: Arousable. Pulmonary Status:   O2 Device: Room air (06/10/19 1104)   Adequate oxygenation and airway patent. Complications related to anesthesia: None    Post-anesthesia assessment completed. No concerns.     Signed By: Sky Garcia MD    6/10/2019  Post anesthesia nausea and vomiting:  controlled      Vitals Value Taken Time   /70 6/10/2019 11:04 AM   Temp 36.6 °C (97.8 °F) 6/10/2019 10:55 AM   Pulse 72 6/10/2019 11:04 AM   Resp 16 6/10/2019 11:04 AM   SpO2 96 % 6/10/2019 11:04 AM

## 2019-06-10 NOTE — PROGRESS NOTES
Subjective:  Symptoms:  Resolved. Diet:  Adequate intake. Pt with no abdomen complaints, tolerating PO ok although appetite somewhat depressed, no N/V or other GI sx,  EGD noted. Main sx are headache and prev vertigo  Temp:  [97.8 °F (36.6 °C)-100.4 °F (38 °C)]   Pulse (Heart Rate):  [39-05]   BP: ()/(44-79)   Resp Rate:  [16-25]   O2 Sat (%):  [90 %-100 %]   Weight:  [47.9 kg (105 lb 9.6 oz)]   06/10 0701 - 06/10 1900  In: 100 [I.V.:100]  Out: 1025 [Urine:1025]  06/08 1901 - 06/10 0700  In: 3581.9 [P.O.:480; I.V.:3101.9]  Out: 2200 [Urine:2200]      Objective:  General Appearance:  Comfortable, well-appearing and in no acute distress. Vital signs: (most recent): Blood pressure 140/79, pulse 79, temperature 99.1 °F (37.3 °C), resp. rate 16, height 5' 2\" (1.575 m), weight 47.9 kg (105 lb 9.6 oz), SpO2 97 %, not currently breastfeeding. Vital signs are normal.  No fever. Lungs:  Normal effort. Heart: Normal rate. Abdomen: Abdomen is soft, flat and non-distended. There are no signs of ascites. Bowel sounds are normal.   There is no abdominal tenderness. Active Problems:    Hypercholesterolemia ()      Nausea & vomiting (6/8/2019)          Assessment & Plan    Nonspecific GI hx, now resolved, doubt SBO, nothing surgical,  If sx persisit rec further GI fu and ? Small bowel serires.    Will sign off, call if needed

## 2019-06-10 NOTE — PROGRESS NOTES
Reason for Admission:  Nausea & Vomiting                   RRAT Score:  13                Do you (patient/family) have any concerns for transition/discharge? Not at this time                 Plan for utilizing home health:  Not at this time     Current Advanced Directive/Advance Care Plan: On file    Likelihood of readmission? Moderate            Transition of Care Plan:   Patient is independent with ADL/IADL to include driving. Patient admits to past HH/Rehab but unable to remember provider. Patient admits to DME use (cane as needed and shower chair). Patient's handicapped son lives with pt. Patient is primary caregiver for son. Patient's son goes to a day program m-f and pt's son has a caregiver on the weekends. Patient's daughter lives in the [address] [place] [address] and is pt's primary support system. Patient also has a brother and a sister that live in the [facility]. Patient plans to go home at d/c. Patient/daught don't have any needs or concerns at this time. CM will continue to follow. PCP- Dr Barbara Diggs in [address] [place] [address] on [facility] FACILITY Management Interventions  PCP Verified by CM: Yes(Dr Alston)  Mode of Transport at Discharge:  Other (see comment)(Daughter Irma Heaton))  Transition of Care Consult (CM Consult): Discharge Planning  Discharge Durable Medical Equipment: No(cane as needed and shower chair)  Physical Therapy Consult: No  Occupational Therapy Consult: No  Speech Therapy Consult: No  Current Support Network: Own Home(Lives in a one story home with handicapped son with 4 steps to enter the home)  Confirm Follow Up Transport: Self  Plan discussed with Pt/Family/Caregiver: Yes(daughter in room, helped verify pt info)  Discharge Location  Discharge Placement: (Anticipate home)      Ophelia Marie  Ext [phone]    [facility] Plan:     *Home

## 2019-06-10 NOTE — PROGRESS NOTES
Bedside shift change report given to Pat Barrios (oncoming nurse) by Pat Barrios (offgoing nurse). Report included the following information SBAR, Intake/Output and Recent Results.

## 2019-06-10 NOTE — PROGRESS NOTES
TRANSFER - IN REPORT:    Verbal report received from Marcela Paredes (zone phone [phone])(name) on Jerome Nieves  being received from Gen-surg(unit) for ordered procedure      Report consisted of patients Situation, Background, Assessment and   Recommendations(SBAR). Information from the following report(s) SBAR, Kardex and MAR was reviewed with the receiving nurse.

## 2019-06-10 NOTE — PROCEDURES
NAME:  Theodore Carrera   :   1932   MRN:   [id number]     Date/Time:  6/10/2019 10:52 AM    Esophagogastroduodenoscopy (EGD) Procedure Note    Procedure: Esophagogastroduodenoscopy with biopsy    Indication:  Nausea and vomitting, persistent and severe-etiology unclear  Pre-operative Diagnosis: see indication above  Post-operative Diagnosis: see findings below  :  Marcelo Arciniega MD  Referring Provider:   -Sam Fritz MD;-Shanae Alston MD    Exam:  Airway: clear, no airway problems anticipated  Heart: RRR, without gallops or rubs  Lungs: clear bilaterally without wheezes, crackles, or rhonchi  Abdomen: soft, nontender, nondistended, bowel sounds present  Mental Status: awake, alert and oriented to person, place and time     Anethesia/Sedation:  MAC anesthesia Propofol 60mg IV  Procedure Details   After informed consent was obtained for the procedure, with all risks and benefits of procedure explained the patient was taken to the endoscopy suite and placed in the left lateral decubitus position. Following sequential administration of sedation as per above, the [id number] gastroscope was inserted into the mouth and advanced under direct vision to second portion of the duodenum. A careful inspection was made as the gastroscope was withdrawn, including a retroflexed view of the proximal stomach; findings and interventions are described below. Findings:    -Normal esophagus; biopsied obtained at gastroesophageal junction to exclude inflammation  -Normal stomach mucosa; biopsied to exclude infalmmation  -Normal duodenum    Therapies:  biopsy of esophagus; biopsy of stomach   Specimens: #! Gastric; #2 g-e jxn  EBL:  None. Complications:   None; patient tolerated the procedure well.            Impression:    -Normal esophagus; biopsied obtained at gastroesophageal junction to exclude inflammation  -Normal stomach mucosa; biopsied to exclude infalmmation  -Normal duodenum    Recommendations:  -Acid suppression with a proton pump inhibitor. ,   -Await pathology. ,   -Follow symptoms. ,   -No plan for colonoscopy at this time. ,   -Resume diet    Discharge disposition:  To room after recover in Endoscopy.   Findings d/w daughter, Jose Soto MD

## 2019-06-10 NOTE — PROGRESS NOTES
Hospitalist Progress Note    NAME: Lauryn Taveras   :  1932   MRN:  [id number]       Assessment / Plan:  Recurrent Nausea & vomiting, Abdominal Pain, Dizziness and Headaches POA, improving  Hyponatremia  Dehydration POA  UTI POA, ruled out    Unknown etiology,   EGD Unremarkable, biopsy done  Urine negative for UTI  DC rocephin  Pt still complain of Abd pain/Nausea after eating, will get RUQ US  Continue IVF  Continue PPI    Other etiology, Complex migraine, IBS?,? Recent MRI brain with and without contrast negative for tumor or CVA    CT A/P with possible ileus? IV rocephin and f/u urine cultures    PRN low dose IV fentanyl and IV Zofran for symptomatic management       Leukocytosis without 2/4 SIRs/Sepsis criteria  Resolved  2/2 stress     Mild hyponatremia, resolved  Volume depletion, improving  Due to dehydration  IVF and monitor lytes     Hypercholesterolemia   Holding statins while NPO      18.5 - 24.9 Normal weight / Body mass index is 19.31 kg/m². Code status: DNR  Prophylaxis: H2B/PPI  Recommended Disposition: Home w/Family   Plans to Get RUQ us, If normal, then DC Home tomorrow am     Subjective:     Chief Complaint / Reason for Physician Visit  Feels better, still has some abd pain, but improved. No nausea, wants some food. Review of Systems:  Symptom Y/N Comments  Symptom Y/N Comments   Fever/Chills n   Chest Pain n    Poor Appetite y   Edema n    Cough n   Abdominal Pain y    Sputum n   Joint Pain     SOB/SMITH n   Pruritis/Rash     Nausea/vomit n   Tolerating PT/OT     Diarrhea    Tolerating Diet     Constipation    Other       Could NOT obtain due to:      Objective:     VITALS:   Last 24hrs VS reviewed since prior progress note.  Most recent are:  Patient Vitals for the past 24 hrs:   Temp Pulse Resp BP SpO2   06/10/19 1539 99 °F (37.2 °C) 72 16 106/63 95 %   06/10/19 1212 99.1 °F (37.3 °C) 79 16 140/79 97 %   06/10/19 1104  72 16 118/70 96 %   06/10/19 1100  72 16 115/70 95 % 06/10/19 1055 97.8 °F (36.6 °C) 77 16 99/60 99 %   06/10/19 1052  75 16 98/55 98 %   06/10/19 1049  76 25 95/47 100 %   06/10/19 1046  77 25 96/48 100 %   06/10/19 0935 97.8 °F (36.6 °C) 68 17 133/67 99 %   06/10/19 0749 98.9 °F (37.2 °C) 73 18 117/61 96 %   06/10/19 0317 99.5 °F (37.5 °C) 78 18 104/47 94 %   06/09/19 2331 99.5 °F (37.5 °C) 75 18 100/50 95 %   06/09/19 1941 99.4 °F (37.4 °C) 80 18 126/65 95 %       Intake/Output Summary (Last 24 hours) at 6/10/2019 1715  Last data filed at 6/10/2019 1420  Gross per 24 hour   Intake 2138.75 ml   Output 3525 ml   Net -1386.25 ml        PHYSICAL EXAM:  General: WD, WN. Alert, cooperative, no acute distress    EENT:  EOMI. Anicteric sclerae. MMM  Resp:  CTA bilaterally, no wheezing or rales. No accessory muscle use  CV:  Regular  rhythm,  No edema  GI:  Soft, Non distended, Non tender.  +Bowel sounds  Neurologic:  Alert and oriented X 3, normal speech,   Psych:   Good insight. Not anxious nor agitated  Skin:  No rashes. No jaundice, poor skin tugor    Reviewed most current lab test results and cultures  YES  Reviewed most current radiology test results   YES  Review and summation of old records today    NO  Reviewed patient's current orders and MAR    YES  PMH/ reviewed - no change compared to H&P  ________________________________________________________________________  Care Plan discussed with:    Comments   Patient x    Family      RN     Care Manager     Consultant  x                      Multidiciplinary team rounds were held today with , nursing, pharmacist and clinical coordinator. Patient's plan of care was discussed; medications were reviewed and discharge planning was addressed.      ________________________________________________________________________  Total NON critical care TIME:  50   Minutes    Total CRITICAL CARE TIME Spent:   Minutes non procedure based      Comments   >50% of visit spent in counseling and coordination of care ________________________________________________________________________  Lew Hanson MD     Procedures: see electronic medical records for all procedures/Xrays and details which were not copied into this note but were reviewed prior to creation of Plan. LABS:  I reviewed today's most current labs and imaging studies.   Pertinent labs include:  Recent Labs     06/10/19  0324 06/09/19  0340 06/08/19  1752   WBC 4.6 9.9 12.9*   HGB 10.7* 12.0 12.6   HCT 33.6* 38.2 39.6   * 150 159     Recent Labs     06/10/19  0324 06/09/19  0620 06/08/19  1752    142 135*   K 3.6 3.9 3.9   * 112* 102   CO2 23 23 27   GLU 94 102* 182*   BUN 9 11 13   CREA 0.72 0.77 0.93   CA 7.4* 7.2* 8.2*   ALB  --   --  3.6   TBILI  --   --  0.6   SGOT  --   --  15   ALT  --   --  17       Signed: Lew Hanson MD

## 2019-06-10 NOTE — PROGRESS NOTES
1215 patient back in room from endo. General Surgery End of Shift Nursing Note    Bedside shift change report given to [facility] (oncoming nurse) by Dale Gill (offgoing nurse). Report included the following information SBAR. Shift worked:   7a-7p   Summary of shift:    Patient voided several times today. Ambulated to bathroom. Left AC IV started leaking so had to put in new IV. Tolerated diet well after EGD. Educated patient on drinking a glass of water at least every hour or two once home and to sit on toilet every 2-3 hours. Patient told this RN she gets  Busy at home and doesn't drink much or go to the bathroom much. Issues for physician to address:   See above     Number times ambulated in hallway past shift: 0    Number of times OOB to chair past shift: 3    Pain Management:  Current medication: see mar  Patient states pain is manageable on current pain medication: YES    GI:    Current diet:  DIET REGULAR    Tolerating current diet: YES  Passing flatus: NO  Last Bowel Movement: several days ago   Appearance: brown    Respiratory:    Incentive Spirometer at bedside: YES  Patient instructed on use: YES    Patient Safety:    Falls Score: 1  Bed Alarm On? No  Sitter?  No    Amna Granados

## 2019-06-10 NOTE — ANESTHESIA PREPROCEDURE EVALUATION
Relevant Problems   No relevant active problems       Anesthetic History     PONV          Review of Systems / Medical History  Patient summary reviewed, nursing notes reviewed and pertinent labs reviewed    Pulmonary  Within defined limits                 Neuro/Psych         TIA     Cardiovascular              Hyperlipidemia    Exercise tolerance: >4 METS     GI/Hepatic/Renal     GERD           Endo/Other        Arthritis and anemia     Other Findings   Comments: thrombocytopenia           Physical Exam    Airway  Mallampati: II  TM Distance: 4 - 6 cm  Neck ROM: normal range of motion   Mouth opening: Normal     Cardiovascular  Regular rate and rhythm,  S1 and S2 normal,  no murmur, click, rub, or gallop             Dental    Dentition: Edentulous     Pulmonary  Breath sounds clear to auscultation               Abdominal  GI exam deferred       Other Findings            Anesthetic Plan    ASA: 2  Anesthesia type: total IV anesthesia and MAC          Induction: Intravenous  Anesthetic plan and risks discussed with: Patient

## 2019-06-11 VITALS
TEMPERATURE: 98.5 F | OXYGEN SATURATION: 99 % | DIASTOLIC BLOOD PRESSURE: 47 MMHG | HEIGHT: 62 IN | BODY MASS INDEX: 19.43 KG/M2 | SYSTOLIC BLOOD PRESSURE: 102 MMHG | WEIGHT: 105.6 LBS | HEART RATE: 84 BPM | RESPIRATION RATE: 19 BRPM

## 2019-06-11 PROBLEM — R11.2 NAUSEA & VOMITING: Status: RESOLVED | Noted: 2019-06-08 | Resolved: 2019-06-11

## 2019-06-11 LAB
ANION GAP SERPL CALC-SCNC: 5 MMOL/L (ref 5–15)
BASOPHILS # BLD: 0 K/UL (ref 0–0.1)
BASOPHILS NFR BLD: 0 % (ref 0–1)
BUN SERPL-MCNC: 10 MG/DL (ref 6–20)
BUN/CREAT SERPL: 14 (ref 12–20)
CALCIUM SERPL-MCNC: 7.4 MG/DL (ref 8.5–10.1)
CHLORIDE SERPL-SCNC: 112 MMOL/L (ref 97–108)
CO2 SERPL-SCNC: 24 MMOL/L (ref 21–32)
CREAT SERPL-MCNC: 0.73 MG/DL (ref 0.55–1.02)
DIFFERENTIAL METHOD BLD: ABNORMAL
EOSINOPHIL # BLD: 0.4 K/UL (ref 0–0.4)
EOSINOPHIL NFR BLD: 8 % (ref 0–7)
ERYTHROCYTE [DISTWIDTH] IN BLOOD BY AUTOMATED COUNT: 13.7 % (ref 11.5–14.5)
GLUCOSE SERPL-MCNC: 89 MG/DL (ref 65–100)
HCT VFR BLD AUTO: 32 % (ref 35–47)
HGB BLD-MCNC: 10.6 G/DL (ref 11.5–16)
IMM GRANULOCYTES # BLD AUTO: 0 K/UL (ref 0–0.04)
IMM GRANULOCYTES NFR BLD AUTO: 0 % (ref 0–0.5)
LYMPHOCYTES # BLD: 1 K/UL (ref 0.8–3.5)
LYMPHOCYTES NFR BLD: 22 % (ref 12–49)
MCH RBC QN AUTO: 29.9 PG (ref 26–34)
MCHC RBC AUTO-ENTMCNC: 33.1 G/DL (ref 30–36.5)
MCV RBC AUTO: 90.4 FL (ref 80–99)
MONOCYTES # BLD: 0.5 K/UL (ref 0–1)
MONOCYTES NFR BLD: 11 % (ref 5–13)
NEUTS SEG # BLD: 2.6 K/UL (ref 1.8–8)
NEUTS SEG NFR BLD: 59 % (ref 32–75)
NRBC # BLD: 0 K/UL (ref 0–0.01)
NRBC BLD-RTO: 0 PER 100 WBC
PLATELET # BLD AUTO: 149 K/UL (ref 150–400)
PMV BLD AUTO: 10 FL (ref 8.9–12.9)
POTASSIUM SERPL-SCNC: 3.5 MMOL/L (ref 3.5–5.1)
RBC # BLD AUTO: 3.54 M/UL (ref 3.8–5.2)
SODIUM SERPL-SCNC: 141 MMOL/L (ref 136–145)
TSH SERPL DL<=0.05 MIU/L-ACNC: 2.83 UIU/ML (ref 0.36–3.74)
WBC # BLD AUTO: 4.5 K/UL (ref 3.6–11)

## 2019-06-11 PROCEDURE — 74011250636 HC RX REV CODE- 250/636: Performed by: INTERNAL MEDICINE

## 2019-06-11 PROCEDURE — C9113 INJ PANTOPRAZOLE SODIUM, VIA: HCPCS | Performed by: INTERNAL MEDICINE

## 2019-06-11 PROCEDURE — 84443 ASSAY THYROID STIM HORMONE: CPT

## 2019-06-11 PROCEDURE — 36415 COLL VENOUS BLD VENIPUNCTURE: CPT

## 2019-06-11 PROCEDURE — 74011000250 HC RX REV CODE- 250: Performed by: INTERNAL MEDICINE

## 2019-06-11 PROCEDURE — 80048 BASIC METABOLIC PNL TOTAL CA: CPT

## 2019-06-11 PROCEDURE — 85025 COMPLETE CBC W/AUTO DIFF WBC: CPT

## 2019-06-11 RX ORDER — PANTOPRAZOLE SODIUM 40 MG/1
40 TABLET, DELAYED RELEASE ORAL DAILY
Qty: 30 TAB | Refills: 0 | Status: SHIPPED | OUTPATIENT
Start: 2019-06-11 | End: 2019-06-18 | Stop reason: SDUPTHER

## 2019-06-11 RX ADMIN — Medication 10 ML: at 06:19

## 2019-06-11 RX ADMIN — HEPARIN SODIUM 5000 UNITS: 5000 INJECTION INTRAVENOUS; SUBCUTANEOUS at 06:20

## 2019-06-11 RX ADMIN — SODIUM CHLORIDE 40 MG: 9 INJECTION, SOLUTION INTRAMUSCULAR; INTRAVENOUS; SUBCUTANEOUS at 09:04

## 2019-06-11 NOTE — ROUTINE PROCESS
The following appointments have been successfully scheduled: 
 
Date/time Tuesday, June 18, 2019 03:45 PM 
Patient  Gris Waggoner 01/07/1932 ([address]) #[id number] [id number] Department [facility] OFFICE-ALEX TEMPLE Appointment type Transitional Care Provider [employer]

## 2019-06-11 NOTE — PROGRESS NOTES
Spiritual Care Partner Volunteer visited patient in General Surgery on June 11, 2019. Documented by:  AYDEE Rodríguez  Paging Service [phone]

## 2019-06-11 NOTE — PROGRESS NOTES
Medicare pt has received, reviewed, and signed 2nd IM letter informing them of their right to appeal the discharge. Signed copy has been placed on pt bedside chart.               Faisal Gallegos  [phone]

## 2019-06-11 NOTE — DISCHARGE INSTRUCTIONS
HOSPITALIST DISCHARGE INSTRUCTIONS    NAME: Zenaida Aragon   :  1932   MRN:  [id number]     Date/Time:  2019 7:47 AM    ADMIT DATE: 2019   DISCHARGE DATE: 2019     Recurrent Nausea & vomiting, Abdominal Pain, Dizziness and Headaches POA- Gastroenteritis? Hyponatremia  Dehydration POA  UTI POA, ruled out  Mild hyponatremia, resolved  Volume depletion, improving  Hypercholesterolemia           18.5 - 24.9 Normal weight / Body mass index is 19.31 kg/m².       · It is important that you take the medication exactly as they are prescribed. · Keep your medication in the bottles provided by the pharmacist and keep a list of the medication names, dosages, and times to be taken in your wallet. · Do not take other medications without consulting your doctor. What to do at Home    Recommended diet:  Regular Diet    Recommended activity: Activity as tolerated      If you have questions regarding the hospital related prescriptions or hospital related issues please call [facility] at [phone]. You can always direct your questions to your primary care doctor if you are unable to reach your hospital physician; your PCP works as an extension of your hospital doctor just like your hospital doctor is an extension of your PCP for your time at the hospital [facility], [facility])    If you experience any of the following symptoms then please call your primary care physician or return to the emergency room if you cannot get hold of your doctor:    Fever, chills, nausea, vomiting, or persistent diarrhea  Worsening weakness or new problems with your speech or balance  Dark stools or visible blood in your stools  New Leg swelling or shortness of breath as these could be signs of a clot    Additional Instructions:      Bring these papers with you to your follow up appointments.  The papers will help your doctors be sure to continue the care plan from the hospital.              Information obtained by :  I understand that if any problems occur once I am at home I am to contact my physician. I understand and acknowledge receipt of the instructions indicated above.                                                                                                                                            Physician's or R.N.'s Signature                                                                  Date/Time                                                                                                                                              Patient or Representative Signature

## 2019-06-11 NOTE — PROGRESS NOTES
GI Progress Note Milon Ben) Lewis Libman :1932 YPL:[id number]   ATTG: Richard Simth MD  PCP: Vicky Best MD  Date/Time:  2019 12:27 PM   Assessment:   · N/V- resolved  · Gen abd pain- resolved     Plan:   · Continue Po  · f/u on EGD bx results  · Consider for d/c home   Will sign off  Subjective:   Discussed with RN events overnight. Tolerated PO. No N/V    Complaint Y/N Description   Abdominal Pain n    Hematemesis n    Hematochezia n    Melena n    Constipation n    Diarrhea n    Dyspepsia n    Dysphagia n    Jaundiced n    Nausea/vomiting n      Review of Systems:  Symptom Y/N Comments  Symptom Y/N Comments   Fever/Chills n   Chest Pain n    Cough n   Headaches n    Sputum n   Joint Pain n    SOB/SMITH n   Pruritis/Rash n    Tolerating Diet y   Other       Could NOT obtain due to:      Objective:   VITALS:   Last 24hrs VS reviewed since prior progress note. Most recent are:  Visit Vitals  /47 (BP 1 Location: Left arm, BP Patient Position: At rest)   Pulse 84   Temp 98.5 °F (36.9 °C)   Resp 19   Ht 5' 2\" (1.575 m)   Wt 47.9 kg (105 lb 9.6 oz)   SpO2 99%   Breastfeeding? No   BMI 19.31 kg/m²       Intake/Output Summary (Last 24 hours) at 2019 1227  Last data filed at 2019 1052  Gross per 24 hour   Intake 1032.5 ml   Output 1700 ml   Net -667.5 ml     PHYSICAL EXAM:  General: WD, WN. Alert, cooperative, no acute distress    HEENT: NC, Atraumatic. PERRL Anicteric sclerae. Lungs:  CTA Bilaterally. No Wheezing/Rhonchi/Rales. Heart:  Regular  rhythm,  No murmur/Rub/Gallops  Abdomen: Soft, Non distended, Non tender.  +BS  Extremities: No c/c/e  Neurologic:  CN 2-12 gi, A/O X 3. No acute neurological distress   Psych:   Good insight. Not anxious nor agitated. Lab and Radiology Data Reviewed: (see below)  US Abdomen Limited. 19  FINDINGS: Abdomen sonogram of the right upper quadrant is performed.  The  gallbladder appears normal, with no stones, wall thickening or associated  tenderness. The bile ducts are not enlarged. Liver demonstrates normal, uniform  echotexture. Pancreatic head appears normal. Right kidney appears normal. There  is no free fluid in the hepatorenal fossa. IMPRESSION: Unremarkable right upper quadrant sonogram.     Medications Reviewed: (see below)  PMH/SH reviewed - no change compared to H&P  ________________________________________________________________________  Total time spent with patient: 15 minutes ________________________________________________________________________  Care Plan discussed with:  Patient y   Family     RN y              Consultant:       Romario Moore MD     Procedures: see electronic medical records for all procedures/Xrays and details which were not copied into this note but were reviewed prior to creation of Plan. LABS:  Recent Labs     06/11/19  0302 06/10/19  0324   WBC 4.5 4.6   HGB 10.6* 10.7*   HCT 32.0* 33.6*   * 123*     Recent Labs     06/11/19  0302 06/10/19  0324 06/09/19  0620    141 142   K 3.5 3.6 3.9   * 112* 112*   CO2 24 23 23   BUN 10 9 11   CREA 0.73 0.72 0.77   GLU 89 94 102*   CA 7.4* 7.4* 7.2*     Recent Labs     06/08/19  1752   SGOT 15   AP 55   TP 7.1   ALB 3.6   GLOB 3.5   LPSE 68*     No results for input(s): INR, PTP, APTT in the last 72 hours. No lab exists for component: INREXT   No results for input(s): FE, TIBC, PSAT, FERR in the last 72 hours. No results found for: FOL, RBCF  No results for input(s): PH, PCO2, PO2 in the last 72 hours.   Recent Labs     06/08/19  1752   CPK 68     Lab Results   Component Value Date/Time    Color YELLOW/STRAW 06/08/2019 09:00 PM    Appearance CLOUDY (A) 06/08/2019 09:00 PM    Specific gravity 1.024 06/08/2019 09:00 PM    pH (UA) 7.0 06/08/2019 09:00 PM    Protein 30 (A) 06/08/2019 09:00 PM    Glucose 100 (A) 06/08/2019 09:00 PM    Ketone TRACE (A) 06/08/2019 09:00 PM    Bilirubin NEGATIVE  06/08/2019 09:00 PM    Urobilinogen 1.0 06/08/2019 09:00 PM    Nitrites NEGATIVE  06/08/2019 09:00 PM    Leukocyte Esterase MODERATE (A) 06/08/2019 09:00 PM    Epithelial cells FEW 06/08/2019 09:00 PM    Bacteria NEGATIVE  06/08/2019 09:00 PM    WBC 20-50 06/08/2019 09:00 PM    RBC 0-5 06/08/2019 09:00 PM       MEDICATIONS:  Current Facility-Administered Medications   Medication Dose Route Frequency    sodium chloride (NS) flush 5-40 mL  5-40 mL IntraVENous Q8H    sodium chloride (NS) flush 5-40 mL  5-40 mL IntraVENous PRN    sodium chloride (NS) flush 5-40 mL  5-40 mL IntraVENous Q8H    sodium chloride (NS) flush 5-40 mL  5-40 mL IntraVENous PRN    fentaNYL citrate (PF) injection 6.5 mcg  6.5 mcg IntraVENous Q4H PRN    acetaminophen (TYLENOL) suppository 650 mg  650 mg Rectal Q6H PRN    sodium chloride (NS) flush 5-40 mL  5-40 mL IntraVENous Q8H    sodium chloride (NS) flush 5-40 mL  5-40 mL IntraVENous PRN    acetaminophen (TYLENOL) tablet 650 mg  650 mg Oral Q6H PRN    ondansetron (ZOFRAN) injection 4 mg  4 mg IntraVENous Q4H PRN    heparin (porcine) injection 5,000 Units  5,000 Units SubCUTAneous Q8H    pantoprazole (PROTONIX) 40 mg in sodium chloride 0.9% 10 mL injection  40 mg IntraVENous Q12H

## 2019-06-11 NOTE — PROGRESS NOTES
Bedside shift change report given to [employer] (oncoming nurse) by [facility] RN (offgoing nurse). Report included the following information SBAR, Kardex, ED Summary, Procedure Summary, Intake/Output, MAR, Recent Results, Med Rec Status, Cardiac Rhythm NSR, Alarm Parameters , Procedure Verification and Quality Measures.

## 2019-06-11 NOTE — PROGRESS NOTES
General Surgery End of Shift Nursing Note    Bedside shift change report given to [facility] (oncoming nurse) by Sophie Moya (offgoing nurse). Report included the following information SBAR and MAR. Shift worked:   [id number]   Summary of shift:    No complaints.    Issues for physician to address:   -         Romayne Cedars

## 2019-06-11 NOTE — DISCHARGE SUMMARY
Hospitalist Discharge Summary     Patient ID:  Home Moya  [id number]  83 y.o.  1/7/1932 6/8/2019    PCP on record: Vazquez Fields MD    Admit date: 6/8/2019  Discharge date and time: 6/11/2019    DISCHARGE DIAGNOSIS:    Recurrent Nausea & vomiting, Abdominal Pain, Dizziness and Headaches POA- Gastroenteritis? Hyponatremia  Dehydration POA  UTI POA, ruled out  Mild hyponatremia, resolved  Volume depletion, improving  Hypercholesterolemia     CONSULTATIONS:  IP CONSULT TO HOSPITALIST  IP CONSULT TO GENERAL SURGERY  IP CONSULT TO GASTROENTEROLOGY    Excerpted HPI from H&P of Brianna Wilkinson MD:  Home Moya is a 80 y.o.  female who presents with Nausea, vomiting, diarrhea, abdominal pain, dizziness & headaches. As per patient, she had similar episode few weeks ago and was seen in ED. Pt reported she started to feel better with prior episode but started to have symptoms again today. Pt reported 6/10 diffuse abdominal pain, aching in nature, intermittent started today with 5 episodes of nausea and vomiting. Pt reported headache diffuse all over the head started yesterday. She also reported dizziness. Pt claims symptoms are exactly are similar with previous episode. Pt denies any fever, chills, chest pain, shortness of breath, problems urination. She denies constipation and has been passing gas and reported regular bowel movements. In ED pt noted to have leukocytosis and CT A/P showed with Trace pleural effusions and bibasilar atelectasis. Trace pelvic free fluid. No definite bowel obstruction with a borderline dilated fluid-filled small bowel loop in the left lower quadrant, nonspecific           ______________________________________________________________________  DISCHARGE SUMMARY/HOSPITAL COURSE:  for full details see H&P, daily progress notes, labs, consult notes.      EGD: 06/10/2019  Findings:    -Normal esophagus; biopsied obtained at gastroesophageal junction to exclude inflammation  -Normal stomach mucosa; biopsied to exclude infalmmation  -Normal duodenum      Last A & P:    Recurrent Nausea & vomiting, Abdominal Pain, Dizziness and Headaches POA, improving  Hyponatremia  Dehydration POA  UTI POA, ruled out     Unknown etiology,   EGD Unremarkable, biopsy done  Urine negative for UTI  DC rocephin  Pt still complain of Abd pain/Nausea after eating, which was resolved. Pt was complaing of abdominal wall pain after she had US, which is resolved now       Other etiology, Complex migraine, IBS?,?      Recent MRI brain with and without contrast negative for tumor or CVA     CT A/P with possible ileus          Leukocytosis without 2/4 SIRs/Sepsis criteria  Resolved  2/2 stress     Mild hyponatremia, resolved  Volume depletion, improved  Due to dehydration  IVF and monitor lytes       _______________________________________________________________________  Patient seen and examined by me on discharge day. Pertinent Findings:  Gen:    Not in distress  Chest: Clear lungs  CVS:   Regular rhythm. No edema  Abd:  Soft, not distended, not tender  Neuro:  Alert, orientedx3  _______________________________________________________________________  DISCHARGE MEDICATIONS:   Discharge Medication List as of 6/11/2019 11:08 AM      START taking these medications    Details   pantoprazole (PROTONIX) 40 mg tablet Take 1 Tab by mouth daily. , Print, Disp-30 Tab, R-0         CONTINUE these medications which have NOT CHANGED    Details   pravastatin (PRAVACHOL) 20 mg tablet TAKE 1 TABLET EVERY DAY  (  DOSE  LOWERED), Normal, Disp-30 Tab, R-4      meclizine (ANTIVERT) 25 mg tablet Take 1 Tab by mouth three (3) times daily as needed for Dizziness., Normal, Disp-20 Tab, R-0      Cholecalciferol, Vitamin D3, (VITAMIN D3) 2,000 unit cap capsule Take 2,000 Units by mouth daily. , Normal, Disp-90 Cap, R-4      ACETAMINOPHEN (TYLENOL PO) Take  by mouth as needed., Historical Med      VIT C/VIT E/LUTEIN/MIN/OMEGA-3 (OCUVITE PO) Take  by mouth daily. , Historical Med         STOP taking these medications       nitrofurantoin, macrocrystal-monohydrate, (MACROBID) 100 mg capsule Comments:   Reason for Stopping:                 Patient Follow Up Instructions:    Activity: Activity as tolerated  Diet: Regular Diet  Wound Care: None needed      Follow-up Information     Follow up With Specialties Details Why Marianela Carrillo MD Internal Medicine Go on 6/18/2019 For hospital follow up appointment at 3:45PM  [address] [place] [address] Avenue  [phone]      Rohini Chaudhary MD Gastroenterology  If symptoms worsen [address]  [phone]          ________________________________________________________________    Risk of deterioration: Low    Condition at Discharge:  Stable  __________________________________________________________________    Disposition  Home with family, no needs    ____________________________________________________________________    Code Status: DNR/DNI  ___________________________________________________________________      Total time in minutes spent coordinating this discharge (includes going over instructions, follow-up, prescriptions, and preparing report for sign off to her PCP) :  50 minutes    Signed:  Reynaldo Nelson MD

## 2019-06-11 NOTE — PROGRESS NOTES
I have reviewed discharge instructions with the patient. The patient verbalized understanding. Discharge medications reviewed with patient and appropriate educational materials and side effects teaching were provided. PATIENT AWAITING DAUGHTER TO ARRIVE FOR TRANSPORTATION HOME.

## 2019-06-11 NOTE — PROGRESS NOTES
GALINDO Plan:     *Home       Patient is discharging home today without any needs. Patients daughter will transport pt home at 12pm.  Follow-up appointment is on the AVS.    Care Management Interventions  PCP Verified by CM: Yes(Dr Alston)  Mode of Transport at Discharge:  Other (see comment)(Daughter Lizeth Herrera))  Transition of Care Consult (CM Consult): Discharge Planning  Discharge Durable Medical Equipment: No(cane as needed and shower chair)  Physical Therapy Consult: No  Occupational Therapy Consult: No  Speech Therapy Consult: No  Current Support Network: Own Home(Lives in a one story home with handicapped son with 4 steps to enter the home)  Confirm Follow Up Transport: Self  Plan discussed with Pt/Family/Caregiver: Yes(daughter in room, helped verify pt info)  Discharge Location  Discharge Placement: Home      Vicente Toussaint  Ext [phone]

## 2019-06-13 ENCOUNTER — PATIENT OUTREACH (OUTPATIENT)
Dept: INTERNAL MEDICINE CLINIC | Facility: CLINIC | Age: 84
End: 2019-06-13

## 2019-06-13 LAB
BACTERIA SPEC CULT: NORMAL
SERVICE CMNT-IMP: NORMAL

## 2019-06-13 NOTE — PROGRESS NOTES
Hospital Discharge Follow-Up      Date/Time:  2019 12:54 PM    Patient was admitted to [facility] on 19 and discharged on 19 for Recurrent Nausea & vomiting, Abdominal Pain, Dizziness and Headaches POA- Gastroenteritis? The physician discharge summary was available at the time of outreach. Patient was contacted within 2 business days of discharge. Top Challenges reviewed with the provider   - Pt reported no n/v, abd pain since d/c. Taking Protonix. GI f/u prn    - D/c WBC 4.5.     - Pt [place], wasn't wearing hearing aids when I called. Had difficulty hearing NN    - PCP f/u 19 @ 3:45 PM       Method of communication with provider :chart routing    Inpatient RRAT score: 15  Was this a readmission? no   Patient stated reason for the readmission: n/a    Nurse Navigator (NN) contacted the patient by telephone to perform post hospital discharge assessment. Verified name and  with patient as identifiers. Provided introduction to self, and explanation of the Nurse Navigator role. Reviewed discharge instructions and red flags with patient who verbalized understanding. Patient given an opportunity to ask questions and does not have any further questions or concerns at this time. The patient agrees to contact the PCP office for questions related to their healthcare. NN provided contact information for future reference. Disease Specific:   N/A    Summary of patient's top problems:  1. Recurrent Nausea & vomiting, Abdominal Pain, Dizziness and Headaches POA- Gastroenteritis? - Surg & GI consults. Chest/Abd xray- no acute disease. CT Abd/Pelv- trace pleural effusions and bibasilar atelectasis. Trace pelvic free fluid. 3. No definite bowel obstruction with a borderline dilated fluid-filled small bowel loop in the left lower quadrant, nonspecific.  US Abd- IMPRESSION: Unremarkable right upper quadrant sonogram. EGD: 06/10/2019-  Normal esophagus; biopsied obtained at gastroesophageal junction to exclude inflammation. Normal stomach mucosa; biopsied to exclude inflammation. Normal duodenum. 2. UTI POA, ruled out- WBC on adm 12.9. UA trace blood, mod leukocyte, neg bacteria. ABX d/c'd. Home Health orders at discharge: [address]: n/a  Date of initial visit: [facility] ordered/company: none  Durable Medical Equipment received: n/a    Barriers to care? pt [facility]     Advance Care Planning:   Does patient have an Advance Directive: document on file is an inpt No Code document not an ACP document    Medication(s):   New Medications at Discharge:   pantoprazole (PROTONIX) 40 mg tablet Take 1 Tab by mouth daily. , Print, Disp-30 Tab, R-0         Changed Medications at Discharge: none    Discontinued Medications at Discharge:    - Nitrofurantoin, macrocrystal-monohydrate, (MACROBID) 100 mg capsule        Medication reconciliation was performed with patient who verbalizes understanding of administration of home medications. There were no barriers to obtaining medications identified at this time. Referral to Pharm D needed: no    Current Outpatient Medications   Medication Sig    pantoprazole (PROTONIX) 40 mg tablet Take 1 Tab by mouth daily.  pravastatin (PRAVACHOL) 20 mg tablet TAKE 1 TABLET EVERY DAY  (  DOSE  LOWERED)    Cholecalciferol, Vitamin D3, (VITAMIN D3) 2,000 unit cap capsule Take 2,000 Units by mouth daily.  ACETAMINOPHEN (TYLENOL PO) Take  by mouth as needed.  VIT C/VIT E/LUTEIN/MIN/OMEGA-3 (OCUVITE PO) Take  by mouth daily.  meclizine (ANTIVERT) 25 mg tablet Take 1 Tab by mouth three (3) times daily as needed for Dizziness. No current facility-administered medications for this visit. There are no discontinued medications.     [facility] follow up appointment(s):   Future Appointments   Date Time Provider [place]   6/18/2019  3:45 PM Chapis Kaur  W. Malini Kamara   8/5/2019  2:30 PM Mary Pete Joseph Cullen  W. [place]      Non-[facility] follow up appointment(s): none  [facility]:  n/a       Goals      Transitions of Care- collaboration & care coordination to prevent complications post hospitalization. 6/13/19- pt reported doing fine \"just tired & weak\". Denied n/v, abd pain. Pt reported 54 yr old handicapped son lives w/ her. SHe is son's primary CG. Son goes to day care program during day. Pt [place]. Reported has hearing aids but didn't have on during call. Agreeable w/ NN contacting dtr to remind of 6/18/19 PCP f/u appt. Attempted to contact dtr David Garcia. Message left. Plan- pt to continue w/ Protonix as prescribed. Pt to attend PCP f/u on 6/18/18. NN to collaborate w/ pt, dtr, PCP & other Care Team Members as needed for care coordination. Next NN f/u ~ 5 days-ID.

## 2019-06-17 ENCOUNTER — PATIENT OUTREACH (OUTPATIENT)
Dept: INTERNAL MEDICINE CLINIC | Facility: CLINIC | Age: 84
End: 2019-06-17

## 2019-06-17 NOTE — PROGRESS NOTES
NN GALINDO F/U Progress Note    Goals Addressed                 This Visit's Progress     Transitions of Care- collaboration & care coordination to prevent complications post hospitalization. 6/17/18- pt contacted. Reported not able to hear NN. Stated \"wait, let me put my hearing aid on\". Pt returned to phone. NN started to speak & pt reported \"still can't really hear you\". Pt requested NN contact dtr @ [phone]. NN attempted to contact dtr per pt request on 6/13/19. Message left. No return call. NN attempted to contact dtr again today. Message left. Plan- PCP f/u scheduled on 6/18/19 @ 3:45 PM. Hopefully dtr is aware of appt. Next NN f/u ~ 7-10 days-ID.      6/13/19- pt reported doing fine \"just tired & weak\". Denied n/v, abd pain. Pt reported 54 yr old handicapped son lives w/ her. SHe is son's primary CG. Son goes to day care program during day. Pt [place]. Reported has hearing aids but didn't have on during call. Agreeable w/ NN contacting dtr to remind of 6/18/19 PCP f/u appt. Attempted to contact dtr Kimani Obrien. Message left. Plan- pt to continue w/ Protonix as prescribed. Pt to attend PCP f/u on 6/18/18. NN to collaborate w/ pt, dtr, PCP & other Care Team Members as needed for care coordination. Next NN f/u ~ 5 days-ID.

## 2019-06-18 ENCOUNTER — OFFICE VISIT (OUTPATIENT)
Dept: INTERNAL MEDICINE CLINIC | Facility: CLINIC | Age: 84
End: 2019-06-18

## 2019-06-18 VITALS
HEIGHT: 62 IN | SYSTOLIC BLOOD PRESSURE: 122 MMHG | HEART RATE: 78 BPM | OXYGEN SATURATION: 95 % | TEMPERATURE: 98.5 F | DIASTOLIC BLOOD PRESSURE: 70 MMHG | WEIGHT: 105 LBS | BODY MASS INDEX: 19.32 KG/M2 | RESPIRATION RATE: 14 BRPM

## 2019-06-18 DIAGNOSIS — R10.9 ABDOMINAL PAIN, UNSPECIFIED ABDOMINAL LOCATION: ICD-10-CM

## 2019-06-18 DIAGNOSIS — R11.2 NON-INTRACTABLE VOMITING WITH NAUSEA, UNSPECIFIED VOMITING TYPE: ICD-10-CM

## 2019-06-18 DIAGNOSIS — L03.116 CELLULITIS OF LEFT LOWER EXTREMITY: ICD-10-CM

## 2019-06-18 DIAGNOSIS — K21.9 GASTROESOPHAGEAL REFLUX DISEASE WITHOUT ESOPHAGITIS: Primary | ICD-10-CM

## 2019-06-18 DIAGNOSIS — M79.89 LEFT LEG SWELLING: ICD-10-CM

## 2019-06-18 RX ORDER — CEPHALEXIN 500 MG/1
500 CAPSULE ORAL 2 TIMES DAILY
Qty: 14 CAP | Refills: 0 | Status: SHIPPED | OUTPATIENT
Start: 2019-06-18 | End: 2019-06-25

## 2019-06-18 RX ORDER — FUROSEMIDE 20 MG/1
20 TABLET ORAL
Qty: 10 TAB | Refills: 0 | Status: SHIPPED | OUTPATIENT
Start: 2019-06-18 | End: 2019-07-01 | Stop reason: SDUPTHER

## 2019-06-18 RX ORDER — PANTOPRAZOLE SODIUM 40 MG/1
40 TABLET, DELAYED RELEASE ORAL DAILY
Qty: 90 TAB | Refills: 1 | Status: SHIPPED | OUTPATIENT
Start: 2019-06-18 | End: 2020-04-21 | Stop reason: SDUPTHER

## 2019-06-18 NOTE — PROGRESS NOTES
HPI  Ms. Irene January is a 80y.o. year old female, she is seen today for [facility] for TCM visit. Admitted 6/8-6/11 to [facility] for nausea/vomiting/abdominal pain. First contact made by Kyra Nelson RN on 6/13/19. Hospital records reviewed. Initially treated with abx for presumed UTI, culture was never sent, was treated with 2 days of CTX IV and abx dc'd. Presented with n/v/dizziness/HA/abdominal pain. No more n/v/abd pain. No f/c, cough, sob, chest pain. Occasional fleeting sharp pain HA. Rare. No cough. Left leg swelling for the past 3 days, didn't start until home from hospital for 4 days. Less swollen now compared to previously. Had aching sensation in leg when more swollen, no known injury or insect bite or cut. No pain with walking now but did have pain initially. Elevated leg, no other treatment. No PND or orthopnea but always sleeps in chair. Taking protonix which helps abdominal discomfort - makes it \"feel better\". EGD: 06/10/2019  Findings:    -Normal esophagus; biopsied obtained at gastroesophageal junction to exclude inflammation  -Normal stomach mucosa; biopsied to exclude infalmmation  -Normal duodenum     Unremarkable RUQ ultrasound. CT abd/pelvis with contrast:  Trace pleural effusions and bibasilar atelectasis. 2. Trace pelvic free fluid. 3. No definite bowel obstruction with a borderline dilated fluid-filled small  bowel loop in the left lower quadrant, nonspecific.        Chief Complaint   Patient presents with   [facility] Follow [facility]     Room [facility]    Leg Swelling     l leg x Saturday         Prior to Admission medications    Medication Sig Start Date End Date Taking? Authorizing Provider   pantoprazole (PROTONIX) 40 mg tablet Take 1 Tab by mouth daily. 6/18/19  Yes Vicky Best MD   cephALEXin [facility]) 500 mg capsule Take 1 Cap by mouth two (2) times a day for 7 days.  6/18/19 6/25/19 Yes Vicky Best MD   furosemide (LASIX) 20 mg tablet Take 1 Tab by mouth daily as needed (swelling). 6/18/19  Yes Katie Lama MD   pravastatin (PRAVACHOL) 20 mg tablet TAKE 1 TABLET EVERY DAY  (  DOSE  LOWERED) 5/21/19  Yes Katie Lama MD   meclizine (ANTIVERT) 25 mg tablet Take 1 Tab by mouth three (3) times daily as needed for Dizziness. 5/18/19  Yes Da Steward MD   Cholecalciferol, Vitamin D3, (VITAMIN D3) 2,000 unit cap capsule Take 2,000 Units by mouth daily. 11/7/17  Yes Katie Lama MD   ACETAMINOPHEN (TYLENOL PO) Take  by mouth as needed. Yes Provider, Historical   VIT C/VIT E/LUTEIN/MIN/OMEGA-3 (OCUVITE PO) Take  by mouth daily. Yes Provider, Historical         Allergies   Allergen Reactions    Lipitor [Atorvastatin] Myalgia    Pravastatin Myalgia    Zetia [Ezetimibe] Other (comments)     itching         REVIEW OF SYSTEMS:  Per HPI    PHYSICAL EXAM:  Visit Vitals  /70   Pulse 78   Temp 98.5 °F (36.9 °C) (Oral)   Resp 14   Ht 5' 2\" (1.575 m)   Wt 105 lb (47.6 kg)   SpO2 95%   BMI 19.20 kg/m²     Constitutional: Appears well-developed and well-nourished. No distress. HENT:   Head: Normocephalic and atraumatic. Eyes: No scleral icterus. Neck: FROM with lateral movements, no LAD  Cardiovascular: Normal S1/S2, regular rhythm. No murmurs, rubs, or gallops. Pulmonary/Chest: Effort normal and breath sounds normal. No respiratory distress. No wheezes, rhonchi, or rales. Abdomen: Soft, NT/ND, +BS, no rebound or guarding, no masses, no HSM appreciated. Ext: No edema rle. 2+ edema lle approx 4cm above ankle to foot with area of warmth and erythema and tenderness anteriorly, no cord palpated, Kris's negative  Neurological: Alert. Psychiatric: Normal mood and affect.  Behavior is normal.     Lab Results   Component Value Date/Time    Sodium 141 06/11/2019 03:02 AM    Potassium 3.5 06/11/2019 03:02 AM    Chloride 112 (H) 06/11/2019 03:02 AM    CO2 24 06/11/2019 03:02 AM    Anion gap 5 06/11/2019 03:02 AM    Glucose 89 06/11/2019 03:02 AM BUN 10 06/11/2019 03:02 AM    Creatinine 0.73 06/11/2019 03:02 AM    BUN/Creatinine ratio 14 06/11/2019 03:02 AM    GFR est AA >60 06/11/2019 03:02 AM    GFR est non-AA >60 06/11/2019 03:02 AM    Calcium 7.4 (L) 06/11/2019 03:02 AM    Bilirubin, total 0.6 06/08/2019 05:52 PM    AST (SGOT) 15 06/08/2019 05:52 PM    Alk. phosphatase 55 06/08/2019 05:52 PM    Protein, total 7.1 06/08/2019 05:52 PM    Albumin 3.6 06/08/2019 05:52 PM    Globulin 3.5 06/08/2019 05:52 PM    A-G Ratio 1.0 (L) 06/08/2019 05:52 PM    ALT (SGPT) 17 06/08/2019 05:52 PM     No results found for: HBA1C, HGBE8, XPJ5NVYY, ZXJ3BFOM   Lab Results   Component Value Date/Time    Cholesterol, total 172 05/01/2019 08:43 AM    HDL Cholesterol 78 05/01/2019 08:43 AM    LDL, calculated 81 05/01/2019 08:43 AM    VLDL, calculated 13 05/01/2019 08:43 AM    Triglyceride 64 05/01/2019 08:43 AM    CHOL/HDL Ratio 2.6 10/12/2010 08:57 AM          ASSESSMENT/PLAN  Diagnoses and all orders for this visit:    1. Gastroesophageal reflux disease without esophagitis  -     pantoprazole (PROTONIX) 40 mg tablet; Take 1 Tab by mouth daily. No more abdominal pain - feels better - continue protonix  2. Left leg swelling  -     furosemide (LASIX) 20 mg tablet; Take 1 Tab by mouth daily as needed (swelling). With evidence of cellulitis - add above and below  3. Cellulitis of left lower extremity  -     cephALEXin (KEFLEX) 500 mg capsule; Take 1 Cap by mouth two (2) times a day for 7 days. 4. Abdominal pain, unspecified abdominal location  Resolved, with n/v on admission - ?partial SBO improved by time of imaging - no symptoms now  5. Non-intractable vomiting with nausea, unspecified vomiting type          Health Maintenance Due   Topic Date Due    Pneumococcal 65+ years (2 of 2 - PPSV23) 07/06/2016               Reviewed plan of care. Patient has provided input and agrees with goals.      The nurse provided the patient and/or family with advanced directive information if needed and encouraged the patient to provide a copy to the office when available.

## 2019-06-18 NOTE — PROGRESS NOTES
Maribel Lizzyluís    Chief Complaint   Patient presents with   [facility] Follow Up     Room [facility]    Leg Swelling     l leg x Saturday            Reviewed record In preparation for visit and have obtained necessary documentation    1. Have you been to the ER, urgent care clinic since your last visit? Hospitalized since your last visit? NO  2. Have you seen or consulted any other health care providers outside of the [address] [place] [address] since your last visit? Include any pap smears or colon screening. NO    Patient has advance directive on file    Provider advised of reason for visit and vitals    Health Maintenance Due   Topic    Pneumococcal 65+ years (2 of 2 - PPSV23)       Wt Readings from Last 3 Encounters:   06/18/19 105 lb (47.6 kg)   06/08/19 105 lb 9.6 oz (47.9 kg)   05/31/19 106 lb 6.4 oz (48.3 kg)     Temp Readings from Last 3 Encounters:   06/11/19 98.5 °F (36.9 °C)   05/31/19 97.6 °F (36.4 °C) (Oral)   05/21/19 97.7 °F (36.5 °C) (Oral)     BP Readings from Last 3 Encounters:   06/11/19 102/47   05/31/19 148/80   05/21/19 146/80     Pulse Readings from Last 3 Encounters:   06/11/19 84   05/31/19 76   05/21/19 76         Learning Assessment:  :     Learning Assessment 6/6/2014   PRIMARY LEARNER Patient   HIGHEST LEVEL OF EDUCATION - PRIMARY LEARNER  DID NOT GRADUATE HIGH SCHOOL   BARRIERS PRIMARY LEARNER NONE   CO-LEARNER CAREGIVER No   PRIMARY LANGUAGE ENGLISH   LEARNER PREFERENCE PRIMARY DEMONSTRATION   ANSWERED BY patient   RELATIONSHIP SELF       Depression Screening:  :     3 most recent PHQ Screens 5/8/2019   Little interest or pleasure in doing things Not at all   Feeling down, depressed, irritable, or hopeless Not at all   Total Score PHQ 2 0       Fall Risk Assessment:  :     Fall Risk Assessment, last 12 mths 5/8/2019   Able to walk? Yes   Fall in past 12 months?  No       Abuse Screening:  :     Abuse Screening Questionnaire 5/8/2019 11/7/2017 11/10/2016 6/6/2014   Do you ever feel afraid of your partner? N N N N   Are you in a relationship with someone who physically or mentally threatens you? N N N N   Is it safe for you to go home? Y Y Y Y       ADL Screening:  :     ADL Assessment 11/7/2017   Feeding yourself No Help Needed   Getting from bed to chair No Help Needed   Getting dressed No Help Needed   Bathing or showering No Help Needed   Walk across the room (includes cane/walker) No Help Needed   Using the telphone No Help Needed   Taking your medications No Help Needed   Preparing meals No Help Needed   Managing money (expenses/bills) No Help Needed   Moderately strenuous housework (laundry) No Help Needed   Shopping for personal items (toiletries/medicines) No Help Needed   Shopping for groceries No Help Needed   Driving No Help Needed   Climbing a flight of stairs No Help Needed   Getting to places beyond walking distances No Help Needed           Medication reconciliation up to date and corrected with patient at this time.

## 2019-07-01 ENCOUNTER — OFFICE VISIT (OUTPATIENT)
Dept: INTERNAL MEDICINE CLINIC | Facility: CLINIC | Age: 84
End: 2019-07-01

## 2019-07-01 VITALS
BODY MASS INDEX: 19.1 KG/M2 | HEART RATE: 63 BPM | OXYGEN SATURATION: 94 % | TEMPERATURE: 97.8 F | RESPIRATION RATE: 14 BRPM | DIASTOLIC BLOOD PRESSURE: 62 MMHG | HEIGHT: 62 IN | SYSTOLIC BLOOD PRESSURE: 129 MMHG | WEIGHT: 103.8 LBS

## 2019-07-01 DIAGNOSIS — M79.89 LEFT LEG SWELLING: ICD-10-CM

## 2019-07-01 RX ORDER — FUROSEMIDE 20 MG/1
20 TABLET ORAL
Qty: 30 TAB | Refills: 3 | Status: SHIPPED | OUTPATIENT
Start: 2019-07-01 | End: 2020-01-23

## 2019-07-01 NOTE — PROGRESS NOTES
HPI  Ms. Nidhi Horan is a 80y.o. year old female, she is seen today for left leg swelling. Last seen 6/21 and I treated her for leg edema and cellulitis left lower leg. Completed keflex and lasix 20mg daily for 7 days. Says symptoms resolved, leg was less swollen but started swelling again about 2 days ago. Has been on her feet a lot. Feels tight but not painful. Was worse yesterday, better today but still swollen. No f/c or sweats. Has had swelling off and on left leg since hip surgery. No chest pain or sob. Chief Complaint   Patient presents with    Leg Swelling     left leg swelling and tightness Room 2C        Prior to Admission medications    Medication Sig Start Date End Date Taking? Authorizing Provider   furosemide (LASIX) 20 mg tablet Take 1 Tab by mouth daily as needed (swelling). 7/1/19  Yes Dilshad Bardales MD   pantoprazole (PROTONIX) 40 mg tablet Take 1 Tab by mouth daily. 6/18/19  Yes Dilshad Bardales MD   pravastatin (PRAVACHOL) 20 mg tablet TAKE 1 TABLET EVERY DAY  (  DOSE  LOWERED) 5/21/19  Yes Dilshad Bardales MD   meclizine (ANTIVERT) 25 mg tablet Take 1 Tab by mouth three (3) times daily as needed for Dizziness. 5/18/19  Yes Yadira Hagan MD   Cholecalciferol, Vitamin D3, (VITAMIN D3) 2,000 unit cap capsule Take 2,000 Units by mouth daily. 11/7/17  Yes Dilshad Bardales MD   VIT C/VIT E/LUTEIN/MIN/OMEGA-3 (OCUVITE PO) Take  by mouth daily. Yes Provider, Historical   ACETAMINOPHEN (TYLENOL PO) Take  by mouth as needed.     Provider, Historical         Allergies   Allergen Reactions    Lipitor [Atorvastatin] Myalgia    Pravastatin Myalgia    Zetia [Ezetimibe] Other (comments)     itching         REVIEW OF SYSTEMS:  Per HPI    PHYSICAL EXAM:  Visit Vitals  /62 (BP 1 Location: Left arm, BP Patient Position: Sitting)   Pulse 63   Temp 97.8 °F (36.6 °C) (Oral)   Resp 14   Ht 5' 2\" (1.575 m)   Wt 103 lb 12.8 oz (47.1 kg)   SpO2 94%   BMI 18.99 kg/m² Constitutional: Appears well-developed and well-nourished. No distress. HENT:   Head: Normocephalic and atraumatic. Eyes: No scleral icterus. Cardiovascular: Normal S1/S2, regular rhythm. No murmurs, rubs, or gallops. Pulmonary/Chest: Effort normal and breath sounds normal. No respiratory distress. No wheezes, rhonchi, or rales. Ext: No edema rle, 1-2+ lower 1/4 left leg. Neurological: Alert. Psychiatric: Normal mood and affect. Behavior is normal.     Lab Results   Component Value Date/Time    Sodium 141 06/11/2019 03:02 AM    Potassium 3.5 06/11/2019 03:02 AM    Chloride 112 (H) 06/11/2019 03:02 AM    CO2 24 06/11/2019 03:02 AM    Anion gap 5 06/11/2019 03:02 AM    Glucose 89 06/11/2019 03:02 AM    BUN 10 06/11/2019 03:02 AM    Creatinine 0.73 06/11/2019 03:02 AM    BUN/Creatinine ratio 14 06/11/2019 03:02 AM    GFR est AA >60 06/11/2019 03:02 AM    GFR est non-AA >60 06/11/2019 03:02 AM    Calcium 7.4 (L) 06/11/2019 03:02 AM    Bilirubin, total 0.6 06/08/2019 05:52 PM    AST (SGOT) 15 06/08/2019 05:52 PM    Alk. phosphatase 55 06/08/2019 05:52 PM    Protein, total 7.1 06/08/2019 05:52 PM    Albumin 3.6 06/08/2019 05:52 PM    Globulin 3.5 06/08/2019 05:52 PM    A-G Ratio 1.0 (L) 06/08/2019 05:52 PM    ALT (SGPT) 17 06/08/2019 05:52 PM     No results found for: HBA1C, HGBE8, ATH2GOJZ, XMU8YJDS   Lab Results   Component Value Date/Time    Cholesterol, total 172 05/01/2019 08:43 AM    HDL Cholesterol 78 05/01/2019 08:43 AM    LDL, calculated 81 05/01/2019 08:43 AM    VLDL, calculated 13 05/01/2019 08:43 AM    Triglyceride 64 05/01/2019 08:43 AM    CHOL/HDL Ratio 2.6 10/12/2010 08:57 AM          ASSESSMENT/PLAN  Diagnoses and all orders for this visit:    1. Left leg swelling  -     DUPLEX LOWER EXT VENOUS LEFT; Future  -     furosemide (LASIX) 20 mg tablet; Take 1 Tab by mouth daily as needed (swelling).       Suspect dependent edema, less likely DVT but given recent hospitalization check doppler, r/o DVT, add lasix which may be needed daily    Health Maintenance Due   Topic Date Due    Pneumococcal 65+ years (2 of 2 - PPSV23) 07/06/2016        Follow-up and Dispositions    · Return in about 2 weeks (around 7/15/2019) for swelling in legs. Reviewed plan of care. Patient has provided input and agrees with goals. The nurse provided the patient and/or family with advanced directive information if needed and encouraged the patient to provide a copy to the office when available.

## 2019-07-05 ENCOUNTER — HOSPITAL ENCOUNTER (OUTPATIENT)
Dept: VASCULAR SURGERY | Age: 84
Discharge: HOME | End: 2019-07-05
Attending: INTERNAL MEDICINE
Payer: MEDICARE

## 2019-07-05 DIAGNOSIS — M79.89 LEFT LEG SWELLING: ICD-10-CM

## 2019-07-05 PROCEDURE — 93971 EXTREMITY STUDY: CPT

## 2019-07-10 ENCOUNTER — TELEPHONE (OUTPATIENT)
Dept: INTERNAL MEDICINE CLINIC | Facility: CLINIC | Age: 84
End: 2019-07-10

## 2019-07-10 NOTE — TELEPHONE ENCOUNTER
VM left for daughter that pt should be evaluated today for this concern. We do not have availability here and would suggest UC or ED. Do not wait until the 17th.

## 2019-07-10 NOTE — TELEPHONE ENCOUNTER
Pt daughter called and wanted to know if it was ok to wait until their appt on the 17th or if they needed to be seen this week due to the pt legs are \"leaking\" per daughter. It is enough to where her pants and socks are getting wet. Pt daughter would like the nurse to call her back either way just so she can tell her mom ye or no.  Daughter stated that you can leave a message if she does not answer that she will be in appointments all day today

## 2019-07-10 NOTE — TELEPHONE ENCOUNTER
Daughter called back and states the weeping is in the L leg only. Swelling has gone down.  Agreeable to come in to see Dr WHITMAN at 80 tomorrow am.

## 2019-07-11 ENCOUNTER — OFFICE VISIT (OUTPATIENT)
Dept: INTERNAL MEDICINE CLINIC | Facility: CLINIC | Age: 84
End: 2019-07-11

## 2019-07-11 VITALS
SYSTOLIC BLOOD PRESSURE: 136 MMHG | HEIGHT: 62 IN | RESPIRATION RATE: 12 BRPM | DIASTOLIC BLOOD PRESSURE: 65 MMHG | BODY MASS INDEX: 19.78 KG/M2 | OXYGEN SATURATION: 98 % | TEMPERATURE: 97.8 F | HEART RATE: 69 BPM | WEIGHT: 107.5 LBS

## 2019-07-11 DIAGNOSIS — L98.9 SKIN LESION: ICD-10-CM

## 2019-07-11 DIAGNOSIS — R60.0 EDEMA OF LEFT LOWER EXTREMITY: Primary | ICD-10-CM

## 2019-07-11 NOTE — PROGRESS NOTES
Gaby Deal  Identified pt with two pt identifiers(name and ). Chief Complaint   Patient presents with    Leg Problem     left leg weeping    Blister     on buttocks       Reviewed record In preparation for visit and have obtained necessary documentation. Advanced directive / living will on file. 1. Have you been to the ER, urgent care clinic or hospitalized since your last visit? No     2. Have you seen or consulted any other health care providers outside of the [address] [place] [address] since your last visit? Include any pap smears or colon screening. No    Vitals reviewed with provider. Health Maintenance reviewed: There are no preventive care reminders to display for this patient.        Wt Readings from Last 3 Encounters:   19 107 lb 8 oz (48.8 kg)   19 103 lb 12.8 oz (47.1 kg)   19 105 lb (47.6 kg)        Temp Readings from Last 3 Encounters:   19 97.8 °F (36.6 °C) (Oral)   19 97.8 °F (36.6 °C) (Oral)   19 98.5 °F (36.9 °C) (Oral)        BP Readings from Last 3 Encounters:   19 136/65   19 129/62   19 122/70        Pulse Readings from Last 3 Encounters:   19 69   19 63   19 78        Vitals:    19 0947   BP: 136/65   Pulse: 69   Resp: 12   Temp: 97.8 °F (36.6 °C)   TempSrc: Oral   SpO2: 98%   Weight: 107 lb 8 oz (48.8 kg)   Height: 5' 2\" (1.575 m)   PainSc:   0 - No pain          Learning Assessment:   :       Learning Assessment 2014   PRIMARY LEARNER Patient   HIGHEST LEVEL OF EDUCATION - PRIMARY LEARNER  DID NOT GRADUATE HIGH SCHOOL   BARRIERS PRIMARY LEARNER NONE   CO-LEARNER CAREGIVER No   PRIMARY LANGUAGE ENGLISH   LEARNER PREFERENCE PRIMARY DEMONSTRATION   ANSWERED BY patient   RELATIONSHIP SELF        Depression Screening:   :       3 most recent PHQ Screens 2019   Little interest or pleasure in doing things Not at all   Feeling down, depressed, irritable, or hopeless Not at all   Total Score PHQ 2 0        Fall Risk Assessment:   :       Fall Risk Assessment, last 12 mths 5/8/2019   Able to walk? Yes   Fall in past 12 months? No        Abuse Screening:   :       Abuse Screening Questionnaire 5/8/2019 11/7/2017 11/10/2016 6/6/2014   Do you ever feel afraid of your partner? N N N N   Are you in a relationship with someone who physically or mentally threatens you? N N N N   Is it safe for you to go home?  Y Y Y Y        ADL Screening:   :       ADL Assessment 11/7/2017   Feeding yourself No Help Needed   Getting from bed to chair No Help Needed   Getting dressed No Help Needed   Bathing or showering No Help Needed   Walk across the room (includes cane/walker) No Help Needed   Using the telphone No Help Needed   Taking your medications No Help Needed   Preparing meals No Help Needed   Managing money (expenses/bills) No Help Needed   Moderately strenuous housework (laundry) No Help Needed   Shopping for personal items (toiletries/medicines) No Help Needed   Shopping for groceries No Help Needed   Driving No Help Needed   Climbing a flight of stairs No Help Needed   Getting to places beyond walking distances No Help Needed

## 2019-07-11 NOTE — PATIENT INSTRUCTIONS
Elevate legs by lying downwith feet up on pillows for 20 minutes twice a day before lunch and dinner. Keep legs elevated when seated. Walk frequently for short periods during the day. Investigate support stockings with a zipper. They are easier to get on and might help. Sizing is based on circumference of ankle and calf in the early morning before swelling blossoms. .   Continue furosemide 20 mg daily. I do not want to increase it at this time because you might get dizzy again at higher doses. Apply zinc oxide to spot on right buttock twice a day. It will reduce friction and protect it. Keep appointment with Dr eng on July 17.

## 2019-07-11 NOTE — PROGRESS NOTES
HISTORY OF PRESENT ILLNESS  Keke Sky is a 80 y.o. female. HPI  She presents complaining of swelling in left leg. She has had swelling since 2010 after THR. She saw Dr [employer] on July 1 and June 18 with same complaint. She tried compression stockings, but was unable to get them on. At June 18 visit swelling was described as 2+ at ankle and foot with warmth, erythema and tenderness. Roly Saunders She was treated with cephalexin and furosemide for 7 days with improvement, but swelling returned 2 days later. On July 1 swelling was described as 1-2+ in lower fourth of leg. She had venous Doppler on July 5 which showed no evidence of thrombosis. Furosemide was started at 20 mg daily. She is not taking any medication that might cause swelling. Skin has been leaking since the ultrasound. Swelling has gone down a little bit and leg does not feel as heavy. She has improved since she called for the appointment      Diet and Lifestyle: generally follows a low sodium diet  Medication compliance: compliant all of the time  She denies palpitations, orthopnea, exertional chest pressure/discomfort, dyspnea on exertion, dizziness     She also has a \"blister on right buttock. She thinks it is result of not being able to get comfortable in hospital bed. 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 0XD    Caregiver stress Z63.6    Wears hearing aid Z97.4     Past Medical History:   Diagnosis Date    Arthritis     GERD (gastroesophageal reflux disease)     Hx-TIA (transient ischemic attack) 2006    Hypercholesterolemia     Osteoporosis     Other ill-defined conditions(799.89)     high cholesterol    S/P colonoscopy 2001     Past Surgical History:   Procedure Laterality Date    HX ORTHOPAEDIC  2010    left hip replacement    [place] COLONOSCOPY FLX DX W/COLLJ SPEC WHEN PFRMD  5/25/2012         STRESS TEST CARDIAC  2007    UPPER GI ENDOSCOPY,BIOPSY  6/10/2019          Social History     Socioeconomic History    Marital status:      Spouse name: Not on file    Number of children: Not on file    Years of education: Not on file    Highest education level: Not on file   Tobacco Use    Smoking status: Never Smoker    Smokeless tobacco: Never Used   Substance and Sexual Activity    Alcohol use: No    Drug use: No    Sexual activity: Not Currently     Family History   Problem Relation Age of Onset    Diabetes Mother     Hypertension Mother     Stroke Mother      Allergies   Allergen Reactions    Lipitor [Atorvastatin] Myalgia    Pravastatin Myalgia    Zetia [Ezetimibe] Other (comments)     itching     Current Outpatient Medications   Medication Sig Dispense Refill    furosemide (LASIX) 20 mg tablet Take 1 Tab by mouth daily as needed (swelling). 30 Tab 3    pantoprazole (PROTONIX) 40 mg tablet Take 1 Tab by mouth daily. 90 Tab 1    pravastatin (PRAVACHOL) 20 mg tablet TAKE 1 TABLET EVERY DAY  (  DOSE  LOWERED) 30 Tab 4    meclizine (ANTIVERT) 25 mg tablet Take 1 Tab by mouth three (3) times daily as needed for Dizziness. 20 Tab 0    Cholecalciferol, Vitamin D3, (VITAMIN D3) 2,000 unit cap capsule Take 2,000 Units by mouth daily. 90 Cap 4    ACETAMINOPHEN (TYLENOL PO) Take  by mouth as needed.  VIT C/VIT E/LUTEIN/MIN/OMEGA-3 (OCUVITE PO) Take  by mouth daily.          ROS  Visit Vitals  /65 (BP 1 Location: Left arm, BP Patient Position: Sitting)   Pulse 69   Temp 97.8 °F (36.6 °C) (Oral)   Resp 12   Ht 5' 2\" (1.575 m)   Wt 107 lb 8 oz (48.8 kg)   SpO2 98%   BMI 19.66 kg/m²     Physical Exam   Constitutional: She is oriented to person, place, and time. She appears well-developed and well-nourished. HENT:   Head: Normocephalic and atraumatic. Eyes: Pupils are equal, round, and reactive to light. Neck: Neck supple. Cardiovascular: Normal rate, regular rhythm and normal heart sounds. Exam reveals no gallop. No murmur heard. Pulmonary/Chest: Effort normal and breath sounds normal. She has no wheezes. She has no rales. Musculoskeletal: She exhibits edema (2+ on left to mid calf; there is sllight wetness from abrasions lateral calf, but no diffuse draining  lesions. ). Neurological: She is alert and oriented to person, place, and time. Skin: Skin is warm, dry and intact. No rash noted. Nursing note and vitals reviewed. Lab Results   Component Value Date/Time    Sodium 141 06/11/2019 03:02 AM    Potassium 3.5 06/11/2019 03:02 AM    Chloride 112 (H) 06/11/2019 03:02 AM    CO2 24 06/11/2019 03:02 AM    Anion gap 5 06/11/2019 03:02 AM    Glucose 89 06/11/2019 03:02 AM    BUN 10 06/11/2019 03:02 AM    Creatinine 0.73 06/11/2019 03:02 AM    BUN/Creatinine ratio 14 06/11/2019 03:02 AM    GFR est AA >60 06/11/2019 03:02 AM    GFR est non-AA >60 06/11/2019 03:02 AM    Calcium 7.4 (L) 06/11/2019 03:02 AM     Lab Results   Component Value Date/Time    TSH 2.83 06/11/2019 03:07 AM       ASSESSMENT and PLAN    ICD-10-CM ICD-9-CM    1. Edema of left lower extremity R60.0 782.3    2. Skin lesion L98.9 709.9      Diagnoses and all orders for this visit:    1. Edema of left lower extremity  Small amount of fluid draining from a few abrasions on lateral calf. She reports edema is less, despite weight being 4 lbs higher than on July 1.     2. Skin lesion  Fortunately no ulceration. Not sure what this represents. Recommeded zinc oxide to prevent friction and follow up with Dr Juan Zavala [facility]. Follow-up and Dispositions    · Return in about 6 days (around 7/17/2019).      already has made appointment with Dr Flavio Milner. lab results and schedule of future lab studies reviewed with patientreviewed diet, exercise and weight control   I have discussed the diagnosis, evaluation and treatment options and the intended plan with the patient. Patient understands and is in agreement. The patient has received an after-visit summary and questions were answered concerning future plans. I have discussed side effects and warnings of any new medications with the patient as well.

## 2019-07-17 ENCOUNTER — OFFICE VISIT (OUTPATIENT)
Dept: INTERNAL MEDICINE CLINIC | Facility: CLINIC | Age: 84
End: 2019-07-17

## 2019-07-17 VITALS
DIASTOLIC BLOOD PRESSURE: 74 MMHG | OXYGEN SATURATION: 97 % | TEMPERATURE: 97.8 F | BODY MASS INDEX: 19.25 KG/M2 | WEIGHT: 104.6 LBS | SYSTOLIC BLOOD PRESSURE: 130 MMHG | HEART RATE: 71 BPM | HEIGHT: 62 IN | RESPIRATION RATE: 14 BRPM

## 2019-07-17 DIAGNOSIS — R60.0 LOCALIZED EDEMA: Primary | ICD-10-CM

## 2019-07-17 NOTE — PROGRESS NOTES
Matthew Ruelas  Identified pt with two pt identifiers(name and ). Chief Complaint   Patient presents with    Swelling     Room        1. Have you been to the ER, urgent care clinic since your last visit? Hospitalized since your last visit? NO    2. Have you seen or consulted any other health care providers outside of the [address] [place] [address] since your last visit? Include any pap smears or colon screening. NO      Provider notified of reason for visit, vitals and flowsheets obtained on patients. Patient received paperwork for advance directive during previous visit but has not completed at this time     Reviewed record In preparation for visit, huddled with provider and have obtained necessary documentation      There are no preventive care reminders to display for this patient.     Wt Readings from Last 3 Encounters:   19 104 lb 9.6 oz (47.4 kg)   19 107 lb 8 oz (48.8 kg)   19 103 lb 12.8 oz (47.1 kg)     Temp Readings from Last 3 Encounters:   19 97.8 °F (36.6 °C) (Oral)   19 97.8 °F (36.6 °C) (Oral)   19 98.5 °F (36.9 °C) (Oral)     BP Readings from Last 3 Encounters:   19 136/65   19 129/62   19 122/70     Pulse Readings from Last 3 Encounters:   19 69   19 63   19 78     Vitals:    19 1505   Resp: 14   Weight: 104 lb 9.6 oz (47.4 kg)   Height: 5' 2\" (1.575 m)   PainSc:   0 - No pain         Learning Assessment:  :     Learning Assessment 2014   PRIMARY LEARNER Patient   HIGHEST LEVEL OF EDUCATION - PRIMARY LEARNER  DID NOT GRADUATE HIGH SCHOOL   BARRIERS PRIMARY LEARNER NONE   CO-LEARNER CAREGIVER No   PRIMARY LANGUAGE ENGLISH   LEARNER PREFERENCE PRIMARY DEMONSTRATION   ANSWERED BY patient   RELATIONSHIP SELF       Depression Screening:  :     3 most recent PHQ Screens 2019   Little interest or pleasure in doing things Not at all   Feeling down, depressed, irritable, or hopeless Not at all   Total Score PHQ 2 0 Fall Risk Assessment:  :     Fall Risk Assessment, last 12 mths 5/8/2019   Able to walk? Yes   Fall in past 12 months? No       Abuse Screening:  :     Abuse Screening Questionnaire 5/8/2019 11/7/2017 11/10/2016 6/6/2014   Do you ever feel afraid of your partner? N N N N   Are you in a relationship with someone who physically or mentally threatens you? N N N N   Is it safe for you to go home? Y Y Y Y       ADL Screening:  :     ADL Assessment 11/7/2017   Feeding yourself No Help Needed   Getting from bed to chair No Help Needed   Getting dressed No Help Needed   Bathing or showering No Help Needed   Walk across the room (includes cane/walker) No Help Needed   Using the telphone No Help Needed   Taking your medications No Help Needed   Preparing meals No Help Needed   Managing money (expenses/bills) No Help Needed   Moderately strenuous housework (laundry) No Help Needed   Shopping for personal items (toiletries/medicines) No Help Needed   Shopping for groceries No Help Needed   Driving No Help Needed   Climbing a flight of stairs No Help Needed   Getting to places beyond walking distances No Help Needed         Medication reconciliation up to date and corrected with patient at this time.

## 2019-07-17 NOTE — PROGRESS NOTES
HPI  Ms. Matthew Ruelas is a 80y.o. year old female, she is seen today for left leg edema. Weight is down 3# since last visit 6 days ago. Says left leg edema goes up and down. No longer weeping or leaking. Has been taking lasix 20mg daily - hasn't noticed difference in UO after taking it. No chest pain or sob. Chief Complaint   Patient presents with    Swelling     Room 2A        Prior to Admission medications    Medication Sig Start Date End Date Taking? Authorizing Provider   furosemide (LASIX) 20 mg tablet Take 1 Tab by mouth daily as needed (swelling). 7/1/19  Yes Zoraida Jett MD   pantoprazole (PROTONIX) 40 mg tablet Take 1 Tab by mouth daily. 6/18/19  Yes Zoraida Jett MD   pravastatin (PRAVACHOL) 20 mg tablet TAKE 1 TABLET EVERY DAY  (  DOSE  LOWERED) 5/21/19  Yes Zoraida Jett MD   meclizine (ANTIVERT) 25 mg tablet Take 1 Tab by mouth three (3) times daily as needed for Dizziness. 5/18/19  Yes Dominic Reyes MD   Cholecalciferol, Vitamin D3, (VITAMIN D3) 2,000 unit cap capsule Take 2,000 Units by mouth daily. 11/7/17  Yes Zoraida Jett MD   ACETAMINOPHEN (TYLENOL PO) Take  by mouth as needed. Yes Provider, Historical   VIT C/VIT E/LUTEIN/MIN/OMEGA-3 (OCUVITE PO) Take  by mouth daily. Yes Provider, Historical         Allergies   Allergen Reactions    Lipitor [Atorvastatin] Myalgia    Pravastatin Myalgia    Zetia [Ezetimibe] Other (comments)     itching         REVIEW OF SYSTEMS:  Per HPI    PHYSICAL EXAM:  Visit Vitals  /74 (BP 1 Location: Left arm, BP Patient Position: Sitting)   Pulse 71   Temp 97.8 °F (36.6 °C) (Oral)   Resp 14   Ht 5' 2\" (1.575 m)   Wt 104 lb 9.6 oz (47.4 kg)   SpO2 97%   BMI 19.13 kg/m²     Constitutional: Appears well-developed and well-nourished. No distress. HENT:   Head: Normocephalic and atraumatic. Eyes: No scleral icterus. Cardiovascular: Normal S1/S2, regular rhythm. No murmurs, rubs, or gallops.   Pulmonary/Chest: Effort normal and breath sounds normal. No respiratory distress. No wheezes, rhonchi, or rales. Ext: trace edema, 2+ edema lower 1/2 left leg. Neurological: Alert. Psychiatric: Normal mood and affect. Behavior is normal.     Lab Results   Component Value Date/Time    Sodium 141 06/11/2019 03:02 AM    Potassium 3.5 06/11/2019 03:02 AM    Chloride 112 (H) 06/11/2019 03:02 AM    CO2 24 06/11/2019 03:02 AM    Anion gap 5 06/11/2019 03:02 AM    Glucose 89 06/11/2019 03:02 AM    BUN 10 06/11/2019 03:02 AM    Creatinine 0.73 06/11/2019 03:02 AM    BUN/Creatinine ratio 14 06/11/2019 03:02 AM    GFR est AA >60 06/11/2019 03:02 AM    GFR est non-AA >60 06/11/2019 03:02 AM    Calcium 7.4 (L) 06/11/2019 03:02 AM    Bilirubin, total 0.6 06/08/2019 05:52 PM    AST (SGOT) 15 06/08/2019 05:52 PM    Alk. phosphatase 55 06/08/2019 05:52 PM    Protein, total 7.1 06/08/2019 05:52 PM    Albumin 3.6 06/08/2019 05:52 PM    Globulin 3.5 06/08/2019 05:52 PM    A-G Ratio 1.0 (L) 06/08/2019 05:52 PM    ALT (SGPT) 17 06/08/2019 05:52 PM     No results found for: HBA1C, HGBE8, LIC3STLZ, VVG5EMDJ   Lab Results   Component Value Date/Time    Cholesterol, total 172 05/01/2019 08:43 AM    HDL Cholesterol 78 05/01/2019 08:43 AM    LDL, calculated 81 05/01/2019 08:43 AM    VLDL, calculated 13 05/01/2019 08:43 AM    Triglyceride 64 05/01/2019 08:43 AM    CHOL/HDL Ratio 2.6 10/12/2010 08:57 AM          ASSESSMENT/PLAN  Diagnoses and all orders for this visit:    1. Localized edema    doppler neg for DVT - suspect venous insufficiency - will increase lasix temporarily and advised compression stockings with zipper - patient and her daughter advised how to measure  Increase furosemide 40mg daily for 3 days then resume 20mg daily. There are no preventive care reminders to display for this patient. Follow-up and Dispositions    · Return in about 1 month (around 8/14/2019) for edema. Reviewed plan of care.  Patient has provided input and agrees with goals. The nurse provided the patient and/or family with advanced directive information if needed and encouraged the patient to provide a copy to the office when available.

## 2019-08-19 ENCOUNTER — OFFICE VISIT (OUTPATIENT)
Dept: INTERNAL MEDICINE CLINIC | Facility: CLINIC | Age: 84
End: 2019-08-19

## 2019-08-19 VITALS
HEART RATE: 84 BPM | HEIGHT: 62 IN | RESPIRATION RATE: 14 BRPM | WEIGHT: 105 LBS | OXYGEN SATURATION: 95 % | BODY MASS INDEX: 19.32 KG/M2 | DIASTOLIC BLOOD PRESSURE: 68 MMHG | TEMPERATURE: 98.7 F | SYSTOLIC BLOOD PRESSURE: 124 MMHG

## 2019-08-19 DIAGNOSIS — K21.9 GASTROESOPHAGEAL REFLUX DISEASE WITHOUT ESOPHAGITIS: Primary | ICD-10-CM

## 2019-08-19 DIAGNOSIS — R60.0 LOCALIZED EDEMA: ICD-10-CM

## 2019-08-19 DIAGNOSIS — Z63.6 CAREGIVER STRESS: ICD-10-CM

## 2019-08-19 SDOH — SOCIAL STABILITY - SOCIAL INSECURITY: DEPENDENT RELATIVE NEEDING CARE AT HOME: Z63.6

## 2019-08-19 NOTE — PROGRESS NOTES
Everton Gilbert    Chief Complaint   Patient presents with    Stress     Room 2A// NON fasting            Reviewed record In preparation for visit and have obtained necessary documentation    1. Have you been to the ER, urgent care clinic since your last visit? Hospitalized since your last visit? NO  2. Have you seen or consulted any other health care providers outside of the [address] [place] [address] since your last visit? Include any pap smears or colon screening. NO    Patient has advance directive on file    Provider advised of reason for visit and vitals    There are no preventive care reminders to display for this patient. Wt Readings from Last 3 Encounters:   08/19/19 105 lb (47.6 kg)   07/17/19 104 lb 9.6 oz (47.4 kg)   07/11/19 107 lb 8 oz (48.8 kg)     Temp Readings from Last 3 Encounters:   08/19/19 98.7 °F (37.1 °C) (Oral)   07/17/19 97.8 °F (36.6 °C) (Oral)   07/11/19 97.8 °F (36.6 °C) (Oral)     BP Readings from Last 3 Encounters:   08/19/19 124/68   07/17/19 130/74   07/11/19 136/65     Pulse Readings from Last 3 Encounters:   08/19/19 84   07/17/19 71   07/11/19 69         Learning Assessment:  :     Learning Assessment 6/6/2014   PRIMARY LEARNER Patient   HIGHEST LEVEL OF EDUCATION - PRIMARY LEARNER  DID NOT GRADUATE HIGH SCHOOL   BARRIERS PRIMARY LEARNER NONE   CO-LEARNER CAREGIVER No   PRIMARY LANGUAGE ENGLISH   LEARNER PREFERENCE PRIMARY DEMONSTRATION   ANSWERED BY patient   RELATIONSHIP SELF       Depression Screening:  :     3 most recent PHQ Screens 5/8/2019   Little interest or pleasure in doing things Not at all   Feeling down, depressed, irritable, or hopeless Not at all   Total Score PHQ 2 0       Fall Risk Assessment:  :     Fall Risk Assessment, last 12 mths 5/8/2019   Able to walk? Yes   Fall in past 12 months? No       Abuse Screening:  :     Abuse Screening Questionnaire 5/8/2019 11/7/2017 11/10/2016 6/6/2014   Do you ever feel afraid of your partner?  N N N N   Are you in a relationship with someone who physically or mentally threatens you? N N N N   Is it safe for you to go home? Y Y Y Y       ADL Screening:  :     ADL Assessment 11/7/2017   Feeding yourself No Help Needed   Getting from bed to chair No Help Needed   Getting dressed No Help Needed   Bathing or showering No Help Needed   Walk across the room (includes cane/walker) No Help Needed   Using the telphone No Help Needed   Taking your medications No Help Needed   Preparing meals No Help Needed   Managing money (expenses/bills) No Help Needed   Moderately strenuous housework (laundry) No Help Needed   Shopping for personal items (toiletries/medicines) No Help Needed   Shopping for groceries No Help Needed   Driving No Help Needed   Climbing a flight of stairs No Help Needed   Getting to places beyond walking distances No Help Needed           Medication reconciliation up to date and corrected with patient at this time.

## 2020-01-17 DIAGNOSIS — E78.00 HYPERCHOLESTEROLEMIA: ICD-10-CM

## 2020-01-17 NOTE — TELEPHONE ENCOUNTER
Pt called to request a refill of   Requested Prescriptions     Pending Prescriptions Disp Refills    pravastatin (PRAVACHOL) 20 mg tablet 30 Tab 4     Sig: TAKE 1 TABLET EVERY DAY  (  DOSE  LOWERED)     Be called into the [address] on file.

## 2020-01-19 RX ORDER — PRAVASTATIN SODIUM 20 MG/1
TABLET ORAL
Qty: 30 TAB | Refills: 4 | Status: SHIPPED | OUTPATIENT
Start: 2020-01-19 | End: 2020-01-23 | Stop reason: SDUPTHER

## 2020-01-23 ENCOUNTER — OFFICE VISIT (OUTPATIENT)
Dept: INTERNAL MEDICINE CLINIC | Facility: CLINIC | Age: 85
End: 2020-01-23

## 2020-01-23 VITALS
HEIGHT: 62 IN | RESPIRATION RATE: 16 BRPM | BODY MASS INDEX: 20.02 KG/M2 | TEMPERATURE: 98 F | DIASTOLIC BLOOD PRESSURE: 74 MMHG | WEIGHT: 108.8 LBS | SYSTOLIC BLOOD PRESSURE: 138 MMHG | HEART RATE: 72 BPM | OXYGEN SATURATION: 98 %

## 2020-01-23 DIAGNOSIS — E78.00 HYPERCHOLESTEROLEMIA: ICD-10-CM

## 2020-01-23 DIAGNOSIS — R07.81 RIB PAIN ON LEFT SIDE: Primary | ICD-10-CM

## 2020-01-23 RX ORDER — PRAVASTATIN SODIUM 20 MG/1
TABLET ORAL
Qty: 90 TAB | Refills: 3 | Status: SHIPPED | OUTPATIENT
Start: 2020-01-23 | End: 2020-04-21 | Stop reason: SDUPTHER

## 2020-01-23 NOTE — PATIENT INSTRUCTIONS
Monitor your skin for any rash. Below is a picture of shingles to look out for-it will not always look just like this so if you have any rash let our office know. Please contact our office if your symptoms worsen or do not improve. Please call 911 or go directly to the Emergency Department if you develop shortness of breath, chest pain, difficulty breathing or worsening of your symptoms. Musculoskeletal Chest Pain: Care Instructions  Your Care Instructions    Chest pain is not always a sign that something is wrong with your heart or that you have another serious problem. The doctor thinks your chest pain is caused by strained muscles or ligaments, inflamed chest cartilage, or another problem in your chest, rather than by your heart. You may need more tests to find the cause of your chest pain. Follow-up care is a key part of your treatment and safety. Be sure to make and go to all appointments, and call your doctor if you are having problems. It's also a good idea to know your test results and keep a list of the medicines you take. How can you care for yourself at home? · Take pain medicines exactly as directed. ? If the doctor gave you a prescription medicine for pain, take it as prescribed. ? If you are not taking a prescription pain medicine, ask your doctor if you can take an over-the-counter medicine. · Rest and protect the sore area. · Stop, change, or take a break from any activity that may be causing your pain or soreness. · Put ice or a cold pack on the sore area for 10 to 20 minutes at a time. Try to do this every 1 to 2 hours for the next 3 days (when you are awake) or until the swelling goes down. Put a thin cloth between the ice and your skin. · After 2 or 3 days, apply a heating pad set on low or a warm cloth to the area that hurts. Some doctors suggest that you go back and forth between hot and cold. · Do not wrap or tape your ribs for support.  This may cause you to take smaller breaths, which could increase your risk of lung problems. · Mentholated creams such as Bengay or Icy Hot may soothe sore muscles. Follow the instructions on the package. · Follow your doctor's instructions for exercising. · Gentle stretching and massage may help you get better faster. Stretch slowly to the point just before pain begins, and hold the stretch for at least 15 to 30 seconds. Do this 3 or 4 times a day. Stretch just after you have applied heat. · As your pain gets better, slowly return to your normal activities. Any increased pain may be a sign that you need to rest a while longer. When should you call for help? Call 911 anytime you think you may need emergency care. For example, call if:    · You have chest pain or pressure. This may occur with:  ? Sweating. ? Shortness of breath. ? Nausea or vomiting. ? Pain that spreads from the chest to the neck, jaw, or one or both shoulders or arms. ? Dizziness or lightheadedness. ? A fast or uneven pulse. After calling 911, chew 1 adult-strength aspirin. Wait for an ambulance. Do not try to drive yourself.     · You have sudden chest pain and shortness of breath, or you cough up blood.    Call your doctor now or seek immediate medical care if:    · You have any trouble breathing.     · Your chest pain gets worse.     · Your chest pain occurs consistently with exercise and is relieved by rest.    Watch closely for changes in your health, and be sure to contact your doctor if:    · Your chest pain does not get better after 1 week. Where can you learn more? Go to http://francisco javier-nadiya.info/. Enter V293 in the search box to learn more about \"Musculoskeletal Chest Pain: Care Instructions. \"  Current as of: June 26, 2019  Content Version: 12.2  © 9904-4860 [employer]. Care instructions adapted under license by [employer] (which disclaims liability or warranty for this information).  If you have questions about a medical condition or this instruction, always ask your healthcare professional. [place] [address] any warranty or liability for your use of this information.

## 2020-01-23 NOTE — PROGRESS NOTES
ESPERANZA Garduno is a 80y.o. year old female patient of Chet Gruber MD who presents with c/o side pain. Pt has history of has Hypercholesterolemia, Hx-TIA (transient ischemic attack), History of colonoscopy, Chest pain, Presbyacusia, DJD (degenerative joint disease) of hip, DJD (degenerative joint disease), ankle and foot, Macular degeneration, Vitamin D deficiency, Pathological fracture of vertebra due to osteoporosis with routine healing, Caregiver stress, and Wears hearing aid on their problem list..    Pt c/o shooting pain under her left armpit/on left side that started last night at 7:00pm. Kept her awake last night- was sore if would lay on that side, took tylenol which helped. Today has no pain but area is very sore to the touch. Describes pain that happened yesterday as sharp,stabbing, tingling- pain comes on quick and goes away quick. Denies SOB or wheezing. Denies chest pain, pressure or palpitations. Has had mild cough that is dry. No recent travel. Denies fevers or chills. Denies nausea, vomiting, diarrhea. Denies heatburn, indigestion, or belching. Denies fall or injury. Has not noticed any changes in her skin. Pt was admitted in June for n/v/abd pain. EGD: 06/10/2019  Findings:    -Normal esophagus; biopsied obtained at gastroesophageal junction to exclude inflammation  -Normal stomach mucosa; biopsied to exclude infalmmation  -Normal duodenum     Unremarkable RUQ ultrasound.      CT abd/pelvis with contrast:  Trace pleural effusions and bibasilar atelectasis. 2. Trace pelvic free fluid. 3. No definite bowel obstruction with a borderline dilated fluid-filled small  bowel loop in the left lower quadrant, nonspecific.              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 0XD    Caregiver stress Z63.6    Wears hearing aid Z97.4     Past Medical History:   Diagnosis Date    Arthritis     GERD (gastroesophageal reflux disease)     Hx-TIA (transient ischemic attack) 2006    Hypercholesterolemia     Osteoporosis     Other ill-defined conditions(799.89)     high cholesterol    S/P colonoscopy 2001     Past Surgical History:   Procedure Laterality Date    HX ORTHOPAEDIC  2010    left hip replacement    IN COLONOSCOPY FLX DX W/COLLJ SPEC WHEN PFRMD  5/25/2012         STRESS TEST CARDIAC  2007    UPPER GI ENDOSCOPY,BIOPSY  6/10/2019          Social History     Socioeconomic History    Marital status:      Spouse name: Not on file    Number of children: Not on file    Years of education: Not on file    Highest education level: Not on file   Tobacco Use    Smoking status: Never Smoker    Smokeless tobacco: Never Used   Substance and Sexual Activity    Alcohol use: No    Drug use: No    Sexual activity: Not Currently     Family History   Problem Relation Age of Onset    Diabetes Mother     Hypertension Mother     Stroke Mother      Allergies   Allergen Reactions    Lipitor [Atorvastatin] Myalgia    Pravastatin Myalgia    Zetia [Ezetimibe] Other (comments)     itching       MEDICATIONS  Current Outpatient Medications   Medication Sig    pravastatin (PRAVACHOL) 20 mg tablet TAKE 1 TABLET EVERY DAY    Cholecalciferol, Vitamin D3, (VITAMIN D3) 2,000 unit cap capsule Take 2,000 Units by mouth daily.  ACETAMINOPHEN (TYLENOL PO) Take  by mouth as needed.  pantoprazole (PROTONIX) 40 mg tablet Take 1 Tab by mouth daily. No current facility-administered medications for this visit.         REVIEW OF SYSTEMS  Per HPI        Visit Vitals  /74 (BP 1 Location: Left arm, BP Patient Position: Sitting)   Pulse 72   Temp 98 °F (36.7 °C) (Oral)   Resp 16   Ht 5' 2\" (1.575 m)   Wt 108 lb 12.8 oz (49.4 kg)   SpO2 98%   BMI 19.90 kg/m²         General: Well-developed, well-nourished. In no distress. A&O x 3. Head: Normocephalic, atraumatic. Eyes: Conjunctiva clear. Neck: Supple, symmetrical, trachea midline, no lymphadenopathy, no carotid bruits, no JVD, thyroid: not enlarged, symmetric, no tenderness/mass/nodules. Lungs: Clear to auscultation bilaterally. No crackles or wheezes. No use of accessory muscles. Speaks in full sentences without SOB. Chest Wall: No deformity. TTP over left rib cage approx ribs 7-9  Heart: RRR, normal S1 and S2, no murmur, click, rub, or gallop. Back: Symmetric. Abdomen: Soft, non-distended, bowel sounds normal. No tenderness. No masses. No hepatosplenomegaly. Skin: No rashes or lesions. Neurovasc: No edema appreciated. Dorsalis pedis pulses are 2+ on the right side, and 2+ on the left side. Posterior tibial pulses are 2+ on the right side, and 2+ on the left side. Musculoskeletal: Gait normal. ROM normal at left shoulder. Psychiatric: Normal mood and affect. Behavior is normal.       No results found for any visits on 01/23/20. ASSESSMENT and PLAN  Diagnoses and all orders for this visit:    1. Rib pain on left side  -     XR CHEST PA LAT; Future  Pain sensation suggestive of shingles thought pt does not have any rash- pt to contact office should she develop any rash  Will obtain CXR to r/o rib or lung acute process  May take tylenol for pain if needed    2. Hypercholesterolemia  -     pravastatin (PRAVACHOL) 20 mg tablet; TAKE 1 TABLET EVERY DAY            Patient Instructions     Monitor your skin for any rash. Below is a picture of shingles to look out for-it will not always look just like this so if you have any rash let our office know.           Please contact our office if your symptoms worsen or do not improve. Please call 911 or go directly to the Emergency Department if you develop shortness of breath, chest pain, difficulty breathing or worsening of your symptoms. Musculoskeletal Chest Pain: Care Instructions  Your Care Instructions    Chest pain is not always a sign that something is wrong with your heart or that you have another serious problem. The doctor thinks your chest pain is caused by strained muscles or ligaments, inflamed chest cartilage, or another problem in your chest, rather than by your heart. You may need more tests to find the cause of your chest pain. Follow-up care is a key part of your treatment and safety. Be sure to make and go to all appointments, and call your doctor if you are having problems. It's also a good idea to know your test results and keep a list of the medicines you take. How can you care for yourself at home? · Take pain medicines exactly as directed. ? If the doctor gave you a prescription medicine for pain, take it as prescribed. ? If you are not taking a prescription pain medicine, ask your doctor if you can take an over-the-counter medicine. · Rest and protect the sore area. · Stop, change, or take a break from any activity that may be causing your pain or soreness. · Put ice or a cold pack on the sore area for 10 to 20 minutes at a time. Try to do this every 1 to 2 hours for the next 3 days (when you are awake) or until the swelling goes down. Put a thin cloth between the ice and your skin. · After 2 or 3 days, apply a heating pad set on low or a warm cloth to the area that hurts. Some doctors suggest that you go back and forth between hot and cold. · Do not wrap or tape your ribs for support. This may cause you to take smaller breaths, which could increase your risk of lung problems. · Mentholated creams such as Bengay or Icy Hot may soothe sore muscles. Follow the instructions on the package.   · Follow your doctor's instructions for exercising. · Gentle stretching and massage may help you get better faster. Stretch slowly to the point just before pain begins, and hold the stretch for at least 15 to 30 seconds. Do this 3 or 4 times a day. Stretch just after you have applied heat. · As your pain gets better, slowly return to your normal activities. Any increased pain may be a sign that you need to rest a while longer. When should you call for help? Call 911 anytime you think you may need emergency care. For example, call if:    · You have chest pain or pressure. This may occur with:  ? Sweating. ? Shortness of breath. ? Nausea or vomiting. ? Pain that spreads from the chest to the neck, jaw, or one or both shoulders or arms. ? Dizziness or lightheadedness. ? A fast or uneven pulse. After calling 911, chew 1 adult-strength aspirin. Wait for an ambulance. Do not try to drive yourself.     · You have sudden chest pain and shortness of breath, or you cough up blood.    Call your doctor now or seek immediate medical care if:    · You have any trouble breathing.     · Your chest pain gets worse.     · Your chest pain occurs consistently with exercise and is relieved by rest.    Watch closely for changes in your health, and be sure to contact your doctor if:    · Your chest pain does not get better after 1 week. Where can you learn more? Go to http://francisco javier-nadiya.info/. Enter V293 in the search box to learn more about \"Musculoskeletal Chest Pain: Care Instructions. \"  Current as of: June 26, 2019  Content Version: 12.2  © 0065-5463 [employer]. Care instructions adapted under license by [employer] (which disclaims liability or warranty for this information). If you have questions about a medical condition or this instruction, always ask your healthcare professional. [address] any warranty or liability for your use of this information.           Please keep your follow-up appointment with Nba Hancock MD.         Health Maintenance Due   Topic Date Due    Shingrix Vaccine Age 49> (1 of 2) [birth year]/1982       I have discussed the diagnosis with the patient and the intended plan as seen in the above orders. Patient is in agreement. The patient has received an after-visit summary and questions were answered concerning future plans. I have discussed medication side effects and warnings with the patient as well. Warning signs for the above conditions were discussed including when to call our office or go to the emergency room. The nurse provided the patient and/or family with advanced directive information if needed and encouraged the patient to provide a copy to the office when available.

## 2020-01-23 NOTE — PROGRESS NOTES
Yordan Pedraza  Identified pt with two pt identifiers(name and ). Chief Complaint   Patient presents with    Side Pain     Room 4B // Started yesterday afternoon // Took Tylenol // Today just sore       Reviewed record In preparation for visit and have obtained necessary documentation. 1. Have you been to the ER, urgent care clinic or hospitalized since your last visit? No     2. Have you seen or consulted any other health care providers outside of the [address] [place] [address] since your last visit? Include any pap smears or colon screening. No    Vitals reviewed with provider.     Health Maintenance reviewed:     Health Maintenance Due   Topic    Shingrix Vaccine Age 50> (1 of 2)          Wt Readings from Last 3 Encounters:   20 108 lb 12.8 oz (49.4 kg)   19 105 lb (47.6 kg)   19 104 lb 9.6 oz (47.4 kg)        Temp Readings from Last 3 Encounters:   20 98 °F (36.7 °C) (Oral)   19 98.7 °F (37.1 °C) (Oral)   19 97.8 °F (36.6 °C) (Oral)        BP Readings from Last 3 Encounters:   20 138/74   19 124/68   19 130/74        Pulse Readings from Last 3 Encounters:   20 72   19 84   19 71        Vitals:    20 1505   BP: 138/74   Pulse: 72   Resp: 16   Temp: 98 °F (36.7 °C)   TempSrc: Oral   SpO2: 98%   Weight: 108 lb 12.8 oz (49.4 kg)   Height: 5' 2\" (1.575 m)   PainSc:   4          Learning Assessment:   :       Learning Assessment 2014   PRIMARY LEARNER Patient   HIGHEST LEVEL OF EDUCATION - PRIMARY LEARNER  DID NOT GRADUATE HIGH SCHOOL   BARRIERS PRIMARY LEARNER NONE   CO-LEARNER CAREGIVER No   PRIMARY LANGUAGE ENGLISH   LEARNER PREFERENCE PRIMARY DEMONSTRATION   ANSWERED BY patient   RELATIONSHIP SELF        Depression Screening:   :       3 most recent PHQ Screens 2020   Little interest or pleasure in doing things Not at all   Feeling down, depressed, irritable, or hopeless Not at all   Total Score PHQ 2 0        Fall Risk Assessment:   :       Fall Risk Assessment, last 12 mths 1/23/2020   Able to walk? Yes   Fall in past 12 months? No        Abuse Screening:   :       Abuse Screening Questionnaire 1/23/2020 5/8/2019 11/7/2017 11/10/2016 6/6/2014   Do you ever feel afraid of your partner? N N N N N   Are you in a relationship with someone who physically or mentally threatens you? N N N N N   Is it safe for you to go home?  Y Y Y Y Y        ADL Screening:   :       ADL Assessment 11/7/2017   Feeding yourself No Help Needed   Getting from bed to chair No Help Needed   Getting dressed No Help Needed   Bathing or showering No Help Needed   Walk across the room (includes cane/walker) No Help Needed   Using the telphone No Help Needed   Taking your medications No Help Needed   Preparing meals No Help Needed   Managing money (expenses/bills) No Help Needed   Moderately strenuous housework (laundry) No Help Needed   Shopping for personal items (toiletries/medicines) No Help Needed   Shopping for groceries No Help Needed   Driving No Help Needed   Climbing a flight of stairs No Help Needed   Getting to places beyond walking distances No Help Needed

## 2020-01-24 ENCOUNTER — OFFICE VISIT (OUTPATIENT)
Dept: INTERNAL MEDICINE CLINIC | Facility: CLINIC | Age: 85
End: 2020-01-24

## 2020-01-24 VITALS
RESPIRATION RATE: 14 BRPM | DIASTOLIC BLOOD PRESSURE: 78 MMHG | WEIGHT: 107.8 LBS | TEMPERATURE: 98.4 F | HEART RATE: 85 BPM | HEIGHT: 62 IN | OXYGEN SATURATION: 98 % | SYSTOLIC BLOOD PRESSURE: 132 MMHG | BODY MASS INDEX: 19.84 KG/M2

## 2020-01-24 DIAGNOSIS — M54.6 THORACIC SPINE PAIN: Primary | ICD-10-CM

## 2020-01-24 DIAGNOSIS — R07.9 CHEST PAIN, UNSPECIFIED TYPE: ICD-10-CM

## 2020-01-24 DIAGNOSIS — M79.2 NEURALGIA: ICD-10-CM

## 2020-01-24 RX ORDER — VALACYCLOVIR HYDROCHLORIDE 1 G/1
1000 TABLET, FILM COATED ORAL 3 TIMES DAILY
Qty: 21 TAB | Refills: 0 | Status: SHIPPED | OUTPATIENT
Start: 2020-01-24 | End: 2020-01-31

## 2020-01-24 NOTE — PATIENT INSTRUCTIONS
Tylenol 500mg - 1000mg every 6 hours as needed for pain. Do not take valacyclovir unless you see a rash.

## 2020-01-24 NOTE — PROGRESS NOTES
HPI  Ms. Derek Henry is a 80y.o. year old female, she is seen today for  Pain on left flank yesterday, now under left breast as well. Sensitive to light touch including clothes touching area. Pain is sharp, fleeting. Happening every few minutes. No f/c, is feeling more tired. Pain started 2 days ago. Took tylenol last night which helped her go to sleep but woke. No n/v/abd pain. No known injury including no heavy lifting, extra housework. Has chronic back pain, unchanged. Has more frequent urination, no dysuria. No blood in urine. No pain anywhere else. Also notes itching in area of pain. Chief Complaint   Patient presents with    Pain (Chronic)     Room 2B// [facility] by [facility] yesterday        Prior to Admission medications    Medication Sig Start Date End Date Taking? Authorizing Provider   valACYclovir (VALTREX) 1 gram tablet Take 1 Tab by mouth three (3) times daily for 7 days. 1/24/20 1/31/20 Yes Yasmin Betancourt MD   pravastatin (PRAVACHOL) 20 mg tablet TAKE 1 TABLET EVERY DAY 1/23/20  Yes Meghana Lai NP   pantoprazole (PROTONIX) 40 mg tablet Take 1 Tab by mouth daily. 6/18/19  Yes Yasmin Betancourt MD   Cholecalciferol, Vitamin D3, (VITAMIN D3) 2,000 unit cap capsule Take 2,000 Units by mouth daily. 11/7/17  Yes Yasmin Betancourt MD   ACETAMINOPHEN (TYLENOL PO) Take  by mouth as needed. Yes Provider, Historical         Allergies   Allergen Reactions    Lipitor [Atorvastatin] Myalgia    Pravastatin Myalgia    Zetia [Ezetimibe] Other (comments)     itching         REVIEW OF SYSTEMS:  Per HPI    PHYSICAL EXAM:  Visit Vitals  /78 (BP 1 Location: Left arm, BP Patient Position: Sitting)   Pulse 85   Temp 98.4 °F (36.9 °C) (Oral)   Resp 14   Ht 5' 2\" (1.575 m)   Wt 107 lb 12.8 oz (48.9 kg)   SpO2 98%   BMI 19.72 kg/m²     Constitutional: Appears well-developed and well-nourished. No distress. Skin: no rash  HENT:   Head: Normocephalic and atraumatic.    Eyes: No scleral icterus. Cardiovascular: Normal S1/S2, regular rhythm. No murmurs, rubs, or gallops. Pulmonary/Chest: Effort normal and breath sounds normal. No respiratory distress. No wheezes, rhonchi, or rales. Abdomen: Soft, NT/ND, +BS, no rebound or guarding, no masses, no HSM appreciated. Back: +scoliosis and kyphosis, +mild tenderness on palpation mid thoracic spine  Ext: No edema. Neurological: Alert. Strength 5/5 b/l UE  Psychiatric: Normal mood and affect. Behavior is normal.     Lab Results   Component Value Date/Time    Sodium 141 06/11/2019 03:02 AM    Potassium 3.5 06/11/2019 03:02 AM    Chloride 112 (H) 06/11/2019 03:02 AM    CO2 24 06/11/2019 03:02 AM    Anion gap 5 06/11/2019 03:02 AM    Glucose 89 06/11/2019 03:02 AM    BUN 10 06/11/2019 03:02 AM    Creatinine 0.73 06/11/2019 03:02 AM    BUN/Creatinine ratio 14 06/11/2019 03:02 AM    GFR est AA >60 06/11/2019 03:02 AM    GFR est non-AA >60 06/11/2019 03:02 AM    Calcium 7.4 (L) 06/11/2019 03:02 AM    Bilirubin, total 0.6 06/08/2019 05:52 PM    AST (SGOT) 15 06/08/2019 05:52 PM    Alk. phosphatase 55 06/08/2019 05:52 PM    Protein, total 7.1 06/08/2019 05:52 PM    Albumin 3.6 06/08/2019 05:52 PM    Globulin 3.5 06/08/2019 05:52 PM    A-G Ratio 1.0 (L) 06/08/2019 05:52 PM    ALT (SGPT) 17 06/08/2019 05:52 PM     No results found for: HBA1C, HGBE8, KGX2LJEI, CZK8LAUT   Lab Results   Component Value Date/Time    Cholesterol, total 172 05/01/2019 08:43 AM    HDL Cholesterol 78 05/01/2019 08:43 AM    LDL, calculated 81 05/01/2019 08:43 AM    VLDL, calculated 13 05/01/2019 08:43 AM    Triglyceride 64 05/01/2019 08:43 AM    CHOL/HDL Ratio 2.6 10/12/2010 08:57 AM          ASSESSMENT/PLAN  Diagnoses and all orders for this visit:    1. Thoracic spine pain  -     XR SPINE THORAC 3 V; Future    2. Chest pain, unspecified type  -     AMB POC EKG ROUTINE W/ 12 LEADS, INTER & REP    3. Neuralgia    Other orders  -     valACYclovir (VALTREX) 1 gram tablet;  Take 1 Tab by mouth three (3) times daily for 7 days. EKG NSR. Do not suspect ischemia as cause of pain. Suspect either shingles and rash hasn't manifest yet or referred pain from thoracic spine. Plan:     Tylenol 500mg - 1000mg every 6 hours as needed for pain. Do not take valacyclovir unless you see a rash. Get films when xray available. There are no preventive care reminders to display for this patient. Reviewed plan of care. Patient has provided input and agrees with goals. The nurse provided the patient and/or family with advanced directive information if needed and encouraged the patient to provide a copy to the office when available.

## 2020-01-24 NOTE — PROGRESS NOTES
Prsica Bragg  Identified pt with two pt identifiers(name and ). Chief Complaint   Patient presents with    Pain (Chronic)     Room 2B// seen by [facility] yesterday       1. Have you been to the ER, urgent care clinic since your last visit? Hospitalized since your last visit? NO    2. Have you seen or consulted any other health care providers outside of the [address] [place] [address] since your last visit? Include any pap smears or colon screening. NO      Provider notified of reason for visit, vitals and flowsheets obtained on patients. Reviewed record In preparation for visit, huddled with provider and have obtained necessary documentation      Health Maintenance Due   Topic    Shingrix Vaccine Age 50> (1 of 2)       Wt Readings from Last 3 Encounters:   20 108 lb 12.8 oz (49.4 kg)   19 105 lb (47.6 kg)   19 104 lb 9.6 oz (47.4 kg)     Temp Readings from Last 3 Encounters:   20 98 °F (36.7 °C) (Oral)   19 98.7 °F (37.1 °C) (Oral)   19 97.8 °F (36.6 °C) (Oral)     BP Readings from Last 3 Encounters:   20 138/74   19 124/68   19 130/74     Pulse Readings from Last 3 Encounters:   20 72   19 84   19 71     There were no vitals filed for this visit. Learning Assessment:  :     Learning Assessment 2014   PRIMARY LEARNER Patient   HIGHEST LEVEL OF EDUCATION - PRIMARY LEARNER  DID NOT GRADUATE HIGH SCHOOL   BARRIERS PRIMARY LEARNER NONE   CO-LEARNER CAREGIVER No   PRIMARY LANGUAGE ENGLISH   LEARNER PREFERENCE PRIMARY DEMONSTRATION   ANSWERED BY patient   RELATIONSHIP SELF       Depression Screening:  :     3 most recent PHQ Screens 2020   Little interest or pleasure in doing things Not at all   Feeling down, depressed, irritable, or hopeless Not at all   Total Score PHQ 2 0       Fall Risk Assessment:  :     Fall Risk Assessment, last 12 mths 2020   Able to walk? Yes   Fall in past 12 months?  No       Abuse Screening:  : Abuse Screening Questionnaire 1/23/2020 5/8/2019 11/7/2017 11/10/2016 6/6/2014   Do you ever feel afraid of your partner? N N N N N   Are you in a relationship with someone who physically or mentally threatens you? N N N N N   Is it safe for you to go home? Y Y Y Y Y       ADL Screening:  :     ADL Assessment 11/7/2017   Feeding yourself No Help Needed   Getting from bed to chair No Help Needed   Getting dressed No Help Needed   Bathing or showering No Help Needed   Walk across the room (includes cane/walker) No Help Needed   Using the telphone No Help Needed   Taking your medications No Help Needed   Preparing meals No Help Needed   Managing money (expenses/bills) No Help Needed   Moderately strenuous housework (laundry) No Help Needed   Shopping for personal items (toiletries/medicines) No Help Needed   Shopping for groceries No Help Needed   Driving No Help Needed   Climbing a flight of stairs No Help Needed   Getting to places beyond walking distances No Help Needed         Medication reconciliation up to date and corrected with patient at this time.

## 2020-02-17 ENCOUNTER — OFFICE VISIT (OUTPATIENT)
Dept: INTERNAL MEDICINE CLINIC | Facility: CLINIC | Age: 85
End: 2020-02-17

## 2020-02-17 VITALS
TEMPERATURE: 98 F | HEIGHT: 62 IN | DIASTOLIC BLOOD PRESSURE: 75 MMHG | RESPIRATION RATE: 14 BRPM | HEART RATE: 73 BPM | OXYGEN SATURATION: 98 % | SYSTOLIC BLOOD PRESSURE: 135 MMHG | WEIGHT: 109 LBS | BODY MASS INDEX: 20.06 KG/M2

## 2020-02-17 DIAGNOSIS — K21.9 GASTROESOPHAGEAL REFLUX DISEASE, ESOPHAGITIS PRESENCE NOT SPECIFIED: Primary | ICD-10-CM

## 2020-02-17 NOTE — PROGRESS NOTES
HPI  Ms. Jerome Nieves is a 80y.o. year old female, she is seen today for follow up GERD. No heartburn, indigestion, n/v, abdominal pain, no brbpr or melena. No weight loss. Since last visit had shingles left chest/flank and pain has resolved. Chief Complaint   Patient presents with    GERD     Room 2A//         Prior to Admission medications    Medication Sig Start Date End Date Taking? Authorizing Provider   pravastatin (PRAVACHOL) 20 mg tablet TAKE 1 TABLET EVERY DAY 1/23/20  Yes Little Santos NP   pantoprazole (PROTONIX) 40 mg tablet Take 1 Tab by mouth daily. 6/18/19  Yes Angel Garcia MD   Cholecalciferol, Vitamin D3, (VITAMIN D3) 2,000 unit cap capsule Take 2,000 Units by mouth daily. 11/7/17  Yes Angel Garcia MD   ACETAMINOPHEN (TYLENOL PO) Take  by mouth as needed. Yes Provider, Historical         Allergies   Allergen Reactions    Lipitor [Atorvastatin] Myalgia    Pravastatin Myalgia    Zetia [Ezetimibe] Other (comments)     itching         REVIEW OF SYSTEMS:  Per HPI    PHYSICAL EXAM:  Visit Vitals  /75 (BP 1 Location: Left arm, BP Patient Position: Sitting)   Pulse 73   Temp 98 °F (36.7 °C) (Oral)   Resp 14   Ht 5' 2\" (1.575 m)   Wt 109 lb (49.4 kg)   SpO2 98%   BMI 19.94 kg/m²     Constitutional: Appears well-developed and well-nourished. No distress. HENT:   Head: Normocephalic and atraumatic. Eyes: No scleral icterus. Cardiovascular: Normal S1/S2, regular rhythm. No murmurs, rubs, or gallops. Pulmonary/Chest: Effort normal and breath sounds normal. No respiratory distress. No wheezes, rhonchi, or rales. Abdomen: Soft, NT/ND, +BS, no rebound or guarding, no masses, no HSM appreciated. Ext: No edema. Neurological: Alert. Psychiatric: Normal mood and affect.  Behavior is normal.     Lab Results   Component Value Date/Time    Sodium 141 06/11/2019 03:02 AM    Potassium 3.5 06/11/2019 03:02 AM    Chloride 112 (H) 06/11/2019 03:02 AM    CO2 24 06/11/2019 03:02 AM    Anion gap 5 06/11/2019 03:02 AM    Glucose 89 06/11/2019 03:02 AM    BUN 10 06/11/2019 03:02 AM    Creatinine 0.73 06/11/2019 03:02 AM    BUN/Creatinine ratio 14 06/11/2019 03:02 AM    GFR est AA >60 06/11/2019 03:02 AM    GFR est non-AA >60 06/11/2019 03:02 AM    Calcium 7.4 (L) 06/11/2019 03:02 AM    Bilirubin, total 0.6 06/08/2019 05:52 PM    AST (SGOT) 15 06/08/2019 05:52 PM    Alk. phosphatase 55 06/08/2019 05:52 PM    Protein, total 7.1 06/08/2019 05:52 PM    Albumin 3.6 06/08/2019 05:52 PM    Globulin 3.5 06/08/2019 05:52 PM    A-G Ratio 1.0 (L) 06/08/2019 05:52 PM    ALT (SGPT) 17 06/08/2019 05:52 PM     No results found for: HBA1C, HGBE8, CVA0VECR, SOA6CSOS   Lab Results   Component Value Date/Time    Cholesterol, total 172 05/01/2019 08:43 AM    HDL Cholesterol 78 05/01/2019 08:43 AM    LDL, calculated 81 05/01/2019 08:43 AM    VLDL, calculated 13 05/01/2019 08:43 AM    Triglyceride 64 05/01/2019 08:43 AM    CHOL/HDL Ratio 2.6 10/12/2010 08:57 AM          ASSESSMENT/PLAN  Diagnoses and all orders for this visit:    1. Gastroesophageal reflux disease, esophagitis presence not specified      Well controlled, continue pantoprazole    There are no preventive care reminders to display for this patient. Follow-up and Dispositions    · Return in about 6 months (around 8/17/2020) for chol, AWV. Reviewed plan of care. Patient has provided input and agrees with goals. The nurse provided the patient and/or family with advanced directive information if needed and encouraged the patient to provide a copy to the office when available.

## 2020-02-17 NOTE — PROGRESS NOTES
Patrice Melchor  Identified pt with two pt identifiers(name and ). Chief Complaint   Patient presents with    GERD     Room . Have you been to the ER, urgent care clinic since your last visit? Hospitalized since your last visit? NO    2. Have you seen or consulted any other health care providers outside of the [address] [place] [address] since your last visit? Include any pap smears or colon screening. NO      Provider notified of reason for visit, vitals and flowsheets obtained on patients. Reviewed record In preparation for visit, huddled with provider and have obtained necessary documentation      There are no preventive care reminders to display for this patient. Wt Readings from Last 3 Encounters:   20 109 lb (49.4 kg)   20 107 lb 12.8 oz (48.9 kg)   20 108 lb 12.8 oz (49.4 kg)     Temp Readings from Last 3 Encounters:   20 98.4 °F (36.9 °C) (Oral)   20 98 °F (36.7 °C) (Oral)   19 98.7 °F (37.1 °C) (Oral)     BP Readings from Last 3 Encounters:   20 132/78   20 138/74   19 124/68     Pulse Readings from Last 3 Encounters:   20 85   20 72   19 84     Vitals:    20 1317   Resp: 14   Weight: 109 lb (49.4 kg)   Height: 5' 2\" (1.575 m)   PainSc:   0 - No pain         Learning Assessment:  :     Learning Assessment 2014   PRIMARY LEARNER Patient   HIGHEST LEVEL OF EDUCATION - PRIMARY LEARNER  DID NOT GRADUATE HIGH SCHOOL   BARRIERS PRIMARY LEARNER NONE   CO-LEARNER CAREGIVER No   PRIMARY LANGUAGE ENGLISH   LEARNER PREFERENCE PRIMARY DEMONSTRATION   ANSWERED BY patient   RELATIONSHIP SELF       Depression Screening:  :     3 most recent PHQ Screens 2020   Little interest or pleasure in doing things Not at all   Feeling down, depressed, irritable, or hopeless Not at all   Total Score PHQ 2 0       Fall Risk Assessment:  :     Fall Risk Assessment, last 12 mths 2020   Able to walk?  Yes   Fall in past 15 months? No       Abuse Screening:  :     Abuse Screening Questionnaire 1/23/2020 5/8/2019 11/7/2017 11/10/2016 6/6/2014   Do you ever feel afraid of your partner? N N N N N   Are you in a relationship with someone who physically or mentally threatens you? N N N N N   Is it safe for you to go home? Y Y Y Y Y       ADL Screening:  :     ADL Assessment 11/7/2017   Feeding yourself No Help Needed   Getting from bed to chair No Help Needed   Getting dressed No Help Needed   Bathing or showering No Help Needed   Walk across the room (includes cane/walker) No Help Needed   Using the telphone No Help Needed   Taking your medications No Help Needed   Preparing meals No Help Needed   Managing money (expenses/bills) No Help Needed   Moderately strenuous housework (laundry) No Help Needed   Shopping for personal items (toiletries/medicines) No Help Needed   Shopping for groceries No Help Needed   Driving No Help Needed   Climbing a flight of stairs No Help Needed   Getting to places beyond walking distances No Help Needed         Medication reconciliation up to date and corrected with patient at this time.

## 2020-04-21 ENCOUNTER — TELEPHONE (OUTPATIENT)
Dept: INTERNAL MEDICINE CLINIC | Facility: CLINIC | Age: 85
End: 2020-04-21

## 2020-04-21 DIAGNOSIS — K21.9 GASTROESOPHAGEAL REFLUX DISEASE WITHOUT ESOPHAGITIS: ICD-10-CM

## 2020-04-21 DIAGNOSIS — D64.9 ANEMIA, UNSPECIFIED TYPE: ICD-10-CM

## 2020-04-21 DIAGNOSIS — E78.00 HYPERCHOLESTEROLEMIA: Primary | ICD-10-CM

## 2020-04-21 DIAGNOSIS — E78.00 HYPERCHOLESTEROLEMIA: ICD-10-CM

## 2020-04-21 RX ORDER — PRAVASTATIN SODIUM 20 MG/1
TABLET ORAL
Qty: 90 TAB | Refills: 3 | Status: SHIPPED | OUTPATIENT
Start: 2020-04-21 | End: 2020-07-22 | Stop reason: SDUPTHER

## 2020-04-21 RX ORDER — PANTOPRAZOLE SODIUM 40 MG/1
40 TABLET, DELAYED RELEASE ORAL DAILY
Qty: 90 TAB | Refills: 3 | Status: SHIPPED | OUTPATIENT
Start: 2020-04-21 | End: 2020-07-22 | Stop reason: SDUPTHER

## 2020-04-21 NOTE — TELEPHONE ENCOUNTER
Pt's daughter, Chantal Sunshine, called to request a refill of her mother's \"two medications\". She was unable to name either medication and requested a call back to clarify before refills are sent into [facility] on file.      Please give Chantal Sunshine a call back at [phone]

## 2020-04-21 NOTE — TELEPHONE ENCOUNTER
Verified patients name and date of birth. Spoke with Roula Woods )(on [facility]) and scheduled future routine appt for patient on 7/22/20. Roula Woods wants to know if patient should go to [facility] for labs prior to visit or if patient needs to be seen sooner than 7/22/20. Please advise and Roula Woods would like paperwork for blood work be mailed. Roula Woods advised a message can be left on her voice mail.

## 2020-04-21 NOTE — TELEPHONE ENCOUNTER
REFILL     PCP: Erick Minor MD     Last appt: 2/17/2020     Future Appointments   Date Time Provider Floyd Haywood   8/17/2020 10:30 AM Erick Minor MD [facility]          Requested Prescriptions     Pending Prescriptions Disp Refills    pravastatin (PRAVACHOL) 20 mg tablet 90 Tab 3     Sig: TAKE 1 TABLET EVERY DAY    pantoprazole (PROTONIX) 40 mg tablet 90 Tab 3     Sig: Take 1 Tab by mouth daily.

## 2020-06-08 ENCOUNTER — TELEPHONE (OUTPATIENT)
Dept: INTERNAL MEDICINE CLINIC | Facility: CLINIC | Age: 85
End: 2020-06-08

## 2020-06-08 DIAGNOSIS — M79.672 FOOT PAIN, LEFT: Primary | ICD-10-CM

## 2020-06-08 NOTE — TELEPHONE ENCOUNTER
Pt called and stated that she would like a referral to the foot doctor. Dr. Claudette Watkins in [place] pt has an appt Thursday. His number is [phone] pt did not know the fax number.  Pt stated that she has been having pain in her foot and it is not turning a dark red purple color

## 2020-07-17 LAB
ALBUMIN SERPL-MCNC: 4.7 G/DL (ref 3.6–4.6)
ALBUMIN/GLOB SERPL: 2 {RATIO} (ref 1.2–2.2)
ALP SERPL-CCNC: 46 IU/L (ref 39–117)
ALT SERPL-CCNC: 11 IU/L (ref 0–32)
AST SERPL-CCNC: 21 IU/L (ref 0–40)
BASOPHILS # BLD AUTO: 0 X10E3/UL (ref 0–0.2)
BASOPHILS NFR BLD AUTO: 1 %
BILIRUB SERPL-MCNC: 0.5 MG/DL (ref 0–1.2)
BUN SERPL-MCNC: 18 MG/DL (ref 8–27)
BUN/CREAT SERPL: 19 (ref 12–28)
CALCIUM SERPL-MCNC: 9.3 MG/DL (ref 8.7–10.3)
CHLORIDE SERPL-SCNC: 101 MMOL/L (ref 96–106)
CHOLEST SERPL-MCNC: 191 MG/DL (ref 100–199)
CO2 SERPL-SCNC: 25 MMOL/L (ref 20–29)
CREAT SERPL-MCNC: 0.93 MG/DL (ref 0.57–1)
EOSINOPHIL # BLD AUTO: 0.1 X10E3/UL (ref 0–0.4)
EOSINOPHIL NFR BLD AUTO: 2 %
ERYTHROCYTE [DISTWIDTH] IN BLOOD BY AUTOMATED COUNT: 12.5 % (ref 11.7–15.4)
GLOBULIN SER CALC-MCNC: 2.3 G/DL (ref 1.5–4.5)
GLUCOSE SERPL-MCNC: 98 MG/DL (ref 65–99)
HCT VFR BLD AUTO: 38.3 % (ref 34–46.6)
HDLC SERPL-MCNC: 85 MG/DL
HGB BLD-MCNC: 12.9 G/DL (ref 11.1–15.9)
IMM GRANULOCYTES # BLD AUTO: 0 X10E3/UL (ref 0–0.1)
IMM GRANULOCYTES NFR BLD AUTO: 0 %
LDLC SERPL CALC-MCNC: 91 MG/DL (ref 0–99)
LYMPHOCYTES # BLD AUTO: 1.5 X10E3/UL (ref 0.7–3.1)
LYMPHOCYTES NFR BLD AUTO: 28 %
MCH RBC QN AUTO: 30.1 PG (ref 26.6–33)
MCHC RBC AUTO-ENTMCNC: 33.7 G/DL (ref 31.5–35.7)
MCV RBC AUTO: 89 FL (ref 79–97)
MONOCYTES # BLD AUTO: 0.5 X10E3/UL (ref 0.1–0.9)
MONOCYTES NFR BLD AUTO: 9 %
NEUTROPHILS # BLD AUTO: 3.1 X10E3/UL (ref 1.4–7)
NEUTROPHILS NFR BLD AUTO: 60 %
PLATELET # BLD AUTO: 170 X10E3/UL (ref 150–450)
POTASSIUM SERPL-SCNC: 4.8 MMOL/L (ref 3.5–5.2)
PROT SERPL-MCNC: 7 G/DL (ref 6–8.5)
RBC # BLD AUTO: 4.29 X10E6/UL (ref 3.77–5.28)
SODIUM SERPL-SCNC: 142 MMOL/L (ref 134–144)
TRIGL SERPL-MCNC: 74 MG/DL (ref 0–149)
VLDLC SERPL CALC-MCNC: 15 MG/DL (ref 5–40)
WBC # BLD AUTO: 5.2 X10E3/UL (ref 3.4–10.8)

## 2020-07-22 ENCOUNTER — OFFICE VISIT (OUTPATIENT)
Dept: INTERNAL MEDICINE CLINIC | Facility: CLINIC | Age: 85
End: 2020-07-22

## 2020-07-22 VITALS
TEMPERATURE: 99 F | BODY MASS INDEX: 18.74 KG/M2 | HEART RATE: 78 BPM | DIASTOLIC BLOOD PRESSURE: 69 MMHG | RESPIRATION RATE: 14 BRPM | WEIGHT: 101.8 LBS | OXYGEN SATURATION: 98 % | SYSTOLIC BLOOD PRESSURE: 127 MMHG | HEIGHT: 62 IN

## 2020-07-22 DIAGNOSIS — Z00.00 MEDICARE ANNUAL WELLNESS VISIT, SUBSEQUENT: Primary | ICD-10-CM

## 2020-07-22 DIAGNOSIS — Z23 ENCOUNTER FOR IMMUNIZATION: ICD-10-CM

## 2020-07-22 DIAGNOSIS — K21.9 GASTROESOPHAGEAL REFLUX DISEASE WITHOUT ESOPHAGITIS: ICD-10-CM

## 2020-07-22 DIAGNOSIS — T14.8XXA ABRASION: ICD-10-CM

## 2020-07-22 DIAGNOSIS — E78.00 HYPERCHOLESTEROLEMIA: ICD-10-CM

## 2020-07-22 DIAGNOSIS — T30.0 BURN: ICD-10-CM

## 2020-07-22 DIAGNOSIS — E55.9 VITAMIN D DEFICIENCY: ICD-10-CM

## 2020-07-22 RX ORDER — PANTOPRAZOLE SODIUM 40 MG/1
40 TABLET, DELAYED RELEASE ORAL DAILY
Qty: 90 TAB | Refills: 3 | Status: SHIPPED | OUTPATIENT
Start: 2020-07-22 | End: 2021-05-04 | Stop reason: SDUPTHER

## 2020-07-22 RX ORDER — PRAVASTATIN SODIUM 20 MG/1
TABLET ORAL
Qty: 90 TAB | Refills: 3 | Status: SHIPPED | OUTPATIENT
Start: 2020-07-22 | End: 2021-05-04 | Stop reason: SDUPTHER

## 2020-07-22 NOTE — PROGRESS NOTES
HPI  Ms. Adriana Julian is a 80y.o. year old female, she is seen today for follow up high cholesterol, GERD. Tripped over a stump of bush rushing inside to answer the phone - about 5 days ago. Had scratches on her arms, no other injury. Was able to get up on her own. No heartburn or indigestion. No chest pain or sob. Has lost weight over stress with her disabled son. He is normally in a  program, also has helpers to assist him getting dressed and bathed during the week and to get him out of home. He just started going back and she is feeling better about this. Went over labs - all okay. Also burned her arm with grease popping out of pan about a week ago. Chief Complaint   Patient presents with    Cholesterol Problem     Room 2A //     Vitamin D Deficiency        Prior to Admission medications    Medication Sig Start Date End Date Taking? Authorizing Provider   pantoprazole (PROTONIX) 40 mg tablet Take 1 Tab by mouth daily. 7/22/20  Yes Lisa Plata MD   pravastatin (PRAVACHOL) 20 mg tablet TAKE 1 TABLET EVERY DAY 7/22/20  Yes Lisa Plata MD   Cholecalciferol, Vitamin D3, (VITAMIN D3) 2,000 unit cap capsule Take 2,000 Units by mouth daily. 11/7/17  Yes Lisa Plata MD   ACETAMINOPHEN (TYLENOL PO) Take  by mouth as needed. Yes Provider, Historical   pravastatin (PRAVACHOL) 20 mg tablet TAKE 1 TABLET EVERY DAY 4/21/20 7/22/20  Lisa Plata MD   pantoprazole (PROTONIX) 40 mg tablet Take 1 Tab by mouth daily.  4/21/20 7/22/20  Lisa Plata MD         Allergies   Allergen Reactions    Lipitor [Atorvastatin] Myalgia    Pravastatin Myalgia    Zetia [Ezetimibe] Other (comments)     itching         REVIEW OF SYSTEMS:  Per HPI    PHYSICAL EXAM:  Visit Vitals  /69 (BP 1 Location: Right arm, BP Patient Position: Sitting)   Pulse 78   Temp 99 °F (37.2 °C) (Oral)   Resp 14   Ht 5' 2\" (1.575 m)   Wt 101 lb 12.8 oz (46.2 kg)   SpO2 98%   BMI 18.62 kg/m² Constitutional: Appears well-developed and well-nourished. No distress. HENT:   Head: Normocephalic and atraumatic. Eyes: No scleral icterus. Neck: no lad, no tm, supple   Cardiovascular: Normal S1/S2, regular rhythm. No murmurs, rubs, or gallops. Pulmonary/Chest: Effort normal and breath sounds normal. No respiratory distress. No wheezes, rhonchi, or rales. Ext: No edema. Skin: laceration left lower leg approx 1cm with small amount fresh blood, mildly tender; +skin tear right elbow without discharge; right wrist with healing burn - peeling skin on edges but dry and no heat, redness, tenderness  Neurological: Alert. Psychiatric: Normal mood and affect. Behavior is normal.     Lab Results   Component Value Date/Time    Sodium 142 07/16/2020 09:58 AM    Potassium 4.8 07/16/2020 09:58 AM    Chloride 101 07/16/2020 09:58 AM    CO2 25 07/16/2020 09:58 AM    Anion gap 5 06/11/2019 03:02 AM    Glucose 98 07/16/2020 09:58 AM    BUN 18 07/16/2020 09:58 AM    Creatinine 0.93 07/16/2020 09:58 AM    BUN/Creatinine ratio 19 07/16/2020 09:58 AM    GFR est AA 63 07/16/2020 09:58 AM    GFR est non-AA 55 (L) 07/16/2020 09:58 AM    Calcium 9.3 07/16/2020 09:58 AM    Bilirubin, total 0.5 07/16/2020 09:58 AM    Alk. phosphatase 46 07/16/2020 09:58 AM    Protein, total 7.0 07/16/2020 09:58 AM    Albumin 4.7 (H) 07/16/2020 09:58 AM    Globulin 3.5 06/08/2019 05:52 PM    A-G Ratio 2.0 07/16/2020 09:58 AM    ALT (SGPT) 11 07/16/2020 09:58 AM     No results found for: HBA1C, HGBE8, SGM4MLGM, XOY2UWNW   Lab Results   Component Value Date/Time    Cholesterol, total 191 07/16/2020 09:58 AM    HDL Cholesterol 85 07/16/2020 09:58 AM    LDL, calculated 91 07/16/2020 09:58 AM    VLDL, calculated 15 07/16/2020 09:58 AM    Triglyceride 74 07/16/2020 09:58 AM    CHOL/HDL Ratio 2.6 10/12/2010 08:57 AM          ASSESSMENT/PLAN  Diagnoses and all orders for this visit:    1. Medicare annual wellness visit, subsequent    2. Gastroesophageal reflux disease without esophagitis  -     pantoprazole (PROTONIX) 40 mg tablet; Take 1 Tab by mouth daily. Stable - continue current medications   3. Hypercholesterolemia  -     pravastatin (PRAVACHOL) 20 mg tablet; TAKE 1 TABLET EVERY DAY  -     METABOLIC PANEL, COMPREHENSIVE; Future  -     LIPID PANEL; Future  Lipids at goal - continue current medications     4. Burn  -     [place] IMMUNIZ ADMIN,1 SINGLE/COMB VAC/TOXOID  Healing - keep clean and dry  5. Abrasion  Keep clean with soap and water, pat dry, apply antibiotic ointment  6. Encounter for immunization  -     TETANUS, DIPHTHERIA TOXOIDS AND ACELLULAR PERTUSSIS VACCINE (TDAP), IN INDIVIDS. >=7, IM    7. Vitamin D deficiency  -     VITAMIN D, 25 HYDROXY; Future          Health Maintenance Due   Topic Date Due    GLAUCOMA SCREENING Q2Y  10/01/2018    Medicare Yearly Exam  05/08/2020        Follow-up and Dispositions    · Return in about 6 months (around 1/22/2021) for chol, labs prior. Reviewed plan of care. Patient has provided input and agrees with goals. The nurse provided the patient and/or family with advanced directive information if needed and encouraged the patient to provide a copy to the office when available. This is the Subsequent Medicare Annual Wellness Exam, performed 12 months or more after the Initial AWV or the last Subsequent AWV    I have reviewed the patient's medical history in detail and updated the computerized patient record.      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08XD    Caregiver stress Z63.6    Wears hearing aid Z97.4     Past Medical History:   Diagnosis Date    Arthritis     GERD (gastroesophageal reflux disease)     Hx-TIA (transient ischemic attack) 2006    Hypercholesterolemia     Osteoporosis     Other ill-defined conditions(799.89)     high cholesterol    S/P colonoscopy 2001      Past Surgical History:   Procedure Laterality Date    HX ORTHOPAEDIC  2010    left hip replacement    [place] COLONOSCOPY FLX DX W/COLLJ SPEC WHEN PFRMD  5/25/2012         STRESS TEST CARDIAC  2007    UPPER GI ENDOSCOPY,BIOPSY  6/10/2019          Current Outpatient Medications   Medication Sig Dispense Refill    pantoprazole (PROTONIX) 40 mg tablet Take 1 Tab by mouth daily. 90 Tab 3    pravastatin (PRAVACHOL) 20 mg tablet TAKE 1 TABLET EVERY DAY 90 Tab 3    Cholecalciferol, Vitamin D3, (VITAMIN D3) 2,000 unit cap capsule Take 2,000 Units by mouth daily. 90 Cap 4    ACETAMINOPHEN (TYLENOL PO) Take  by mouth as needed. Allergies   Allergen Reactions    Lipitor [Atorvastatin] Myalgia    Pravastatin Myalgia    Zetia [Ezetimibe] Other (comments)     itching       Family History   Problem Relation Age of Onset    Diabetes Mother     Hypertension Mother     Stroke Mother      Social History     Tobacco Use    Smoking status: Never Smoker    Smokeless tobacco: Never Used   Substance Use Topics    Alcohol use: No       Depression Risk Factor Screening:     3 most recent PHQ Screens 1/23/2020   Little interest or pleasure in doing things Not at all   Feeling down, depressed, irritable, or hopeless Not at all   Total Score PHQ 2 0       Alcohol Risk Factor Screening:   Do you average 1 drink per night or more than 7 drinks a week:  No    On any one occasion in the past three months have you have had more than 3 drinks containing alcohol:  No      Functional Ability and Level of Safety:   Hearing: Hearing is good.  with some hearing loss     Activities of Daily Living: The home contains: handrails and grab bars  Patient does total self care     Ambulation: with mild difficulty     Fall Risk:  Fall Risk Assessment, last 12 mths 7/22/2020   Able to walk? Yes   Fall in past 12 months? Yes   Fall with injury? No   Number of falls in past 12 months 1   Fall Risk Score 1     Abuse Screen:  Patient is not abused       Cognitive Screening   Has your family/caregiver stated any concerns about your memory: no     Cognitive Screening: mild forgetfullness    Patient Care Team   Patient Care Team:  Yazan Carpenter MD as PCP - General (Internal Medicine)  Yazan Carpenter MD as PCP - [facility] Provider    Assessment/Plan   Education and counseling provided:  Are appropriate based on today's review and evaluation    Diagnoses and all orders for this visit:    1. Medicare annual wellness visit, subsequent    2. Gastroesophageal reflux disease without esophagitis  -     pantoprazole (PROTONIX) 40 mg tablet; Take 1 Tab by mouth daily. 3. Hypercholesterolemia  -     pravastatin (PRAVACHOL) 20 mg tablet; TAKE 1 TABLET EVERY DAY  -     METABOLIC PANEL, COMPREHENSIVE; Future  -     LIPID PANEL; Future    4. Burn  -     [place] IMMUNIZ ADMIN,1 SINGLE/COMB VAC/TOXOID    5. Abrasion    6. Encounter for immunization  -     TETANUS, DIPHTHERIA TOXOIDS AND ACELLULAR PERTUSSIS VACCINE (TDAP), IN INDIVIDS. >=7, IM    7.  Vitamin D deficiency  -     VITAMIN D, 25 HYDROXY; Future        Health Maintenance Due   Topic Date Due    GLAUCOMA SCREENING Q2Y  10/01/2018    Medicare Yearly Exam  05/08/2020

## 2020-07-22 NOTE — PATIENT INSTRUCTIONS
Medicare Wellness Visit, Female     The best way to live healthy is to have a lifestyle where you eat a well-balanced diet, exercise regularly, limit alcohol use, and quit all forms of tobacco/nicotine, if applicable. Regular preventive services are another way to keep healthy. Preventive services (vaccines, screening tests, monitoring & exams) can help personalize your care plan, which helps you manage your own care. Screening tests can find health problems at the earliest stages, when they are easiest to treat. [facility] follows the current, evidence-based guidelines published by the [facility] Emiliano Connor ([facility]) when recommending preventive services for our patients. Because we follow these guidelines, sometimes recommendations change over time as research supports it. (For example, mammograms used to be recommended annually. Even though Medicare will still pay for an annual mammogram, the newer guidelines recommend a mammogram every two years for women of average risk). Of course, you and your doctor may decide to screen more often for some diseases, based on your risk and your co-morbidities (chronic disease you are already diagnosed with). Preventive services for you include:  - Medicare offers their members a free annual wellness visit, which is time for you and your primary care provider to discuss and plan for your preventive service needs. Take advantage of this benefit every year!  -All adults over the age of 72 should receive the recommended pneumonia vaccines. Current USPSTF guidelines recommend a series of two vaccines for the best pneumonia protection.   -All adults should have a flu vaccine yearly and a tetanus vaccine every 10 years.   -All adults age 48 and older should receive the shingles vaccines (series of two vaccines).       -All adults age 38-68 who are overweight should have a diabetes screening test once every three years.   -All adults born between [birth year] and 1965 should be screened once for Hepatitis C.  -Other screening tests and preventive services for persons with diabetes include: an eye exam to screen for diabetic retinopathy, a kidney function test, a foot exam, and stricter control over your cholesterol.   -Cardiovascular screening for adults with routine risk involves an electrocardiogram (ECG) at intervals determined by your doctor.   -Colorectal cancer screenings should be done for adults age 54-65 with no increased risk factors for colorectal cancer. There are a number of acceptable methods of screening for this type of cancer. Each test has its own benefits and drawbacks. Discuss with your doctor what is most appropriate for you during your annual wellness visit. The different tests include: colonoscopy (considered the best screening method), a fecal occult blood test, a fecal DNA test, and sigmoidoscopy.    -A bone mass density test is recommended when a woman turns 65 to screen for osteoporosis. This test is only recommended one time, as a screening. Some providers will use this same test as a disease monitoring tool if you already have osteoporosis. -Breast cancer screenings are recommended every other year for women of normal risk, age 54-69.  -Cervical cancer screenings for women over age 72 are only recommended with certain risk factors. Here is a list of your current Health Maintenance items (your personalized list of preventive services) with a due date:  Health Maintenance Due   Topic Date Due    Glaucoma Screening   10/01/2018    Annual Well Visit  05/08/2020         Vaccine Information Statement    Tdap (Tetanus, Diphtheria, Pertussis) Vaccine: What you need to know     Many Vaccine Information Statements are available in [place] and other languages. See www.immunize.org/vis  Hojas de información sobre vacunas están disponibles en español y en muchos otros idiomas. Visite www.immunize.org/vis    1.  Why get vaccinated? Tdap vaccine can prevent tetanus, diphtheria, and pertussis. Diphtheria and pertussis spread from person to person. Tetanus enters the body through cuts or wounds.  TETANUS (T) causes painful stiffening of the muscles. Tetanus can lead to serious health problems, including being unable to open the mouth, having trouble swallowing and breathing, or death.  DIPHTHERIA (D) can lead to difficulty breathing, heart failure, paralysis, or death.  PERTUSSIS (aP), also known as whooping cough, can cause uncontrollable, violent coughing which makes it hard to breathe, eat, or drink. Pertussis can be extremely serious in babies and young children, causing pneumonia, convulsions, brain damage, or death. In teens and adults, it can cause weight loss, loss of bladder control, passing out, and rib fractures from severe coughing. 2. Tdap vaccine     Tdap is only for children 7 years and older, adolescents, and adults. Adolescents should receive a single dose of Tdap, preferably at age 6 or 15 years. Pregnant women should get a dose of Tdap during every pregnancy, to protect the  from pertussis. Infants are most at risk for severe, life-threatening complications from pertussis. Adults who have never received Tdap should get a dose of Tdap. Also, adults should receive a booster dose every 10 years, or earlier in the case of a severe and dirty wound or burn. Booster doses can be either Tdap or Td (a different vaccine that protects against tetanus and diphtheria but not pertussis). Tdap may be given at the same time as other vaccines. 3. Talk with your health care provider    Tell your vaccine provider if the person getting the vaccine:   Has had an allergic reaction after a previous dose of any vaccine that protects against tetanus, diphtheria, or pertussis, or has any severe, life-threatening allergies.     Has had a coma, decreased level of consciousness, or prolonged seizures within 7 days after a previous dose of any pertussis vaccine (DTP, DTaP, or Tdap).  Has seizures or another nervous system problem.  Has ever had Guillain-Barré Syndrome (also called GBS).  Has had severe pain or swelling after a previous dose of any vaccine that protects against tetanus or diphtheria. In some cases, your health care provider may decide to postpone Tdap vaccination to a future visit. People with minor illnesses, such as a cold, may be vaccinated. People who are moderately or severely ill should usually wait until they recover before getting Tdap vaccine. Your health care provider can give you more information. 4. Risks of a vaccine reaction     Pain, redness, or swelling where the shot was given, mild fever, headache, feeling tired, and nausea, vomiting, diarrhea, or stomachache sometimes happen after Tdap vaccine. People sometimes faint after medical procedures, including vaccination. Tell your provider if you feel dizzy or have vision changes or ringing in the ears. As with any medicine, there is a very remote chance of a vaccine causing a severe allergic reaction, other serious injury, or death. 5. What if there is a serious problem? An allergic reaction could occur after the vaccinated person leaves the clinic. If you see signs of a severe allergic reaction (hives, swelling of the face and throat, difficulty breathing, a fast heartbeat, dizziness, or weakness), call 9-1-1 and get the person to the nearest hospital.    For other signs that concern you, call your health care provider. Adverse reactions should be reported to the Vaccine Adverse Event Reporting System (VAERS). Your health care provider will usually file this report, or you can do it yourself. Visit the VAERS website at www.vaers. hhs.gov or call 9-481.703.4331. VAERS is only for reporting reactions, and VAERS staff do not give medical advice.     6. The National Vaccine Injury Compensation Program    The [place] Medical Injury Compensation Program (VICP) is a federal program that was created to compensate people who may have been injured by certain vaccines. Visit the VICP website at www.[facility].gov/vaccinecompensation or call 1-103.767.2520 to learn about the program and about filing a claim. There is a time limit to file a claim for compensation. 7. How can I learn more?  Ask your health care provider.  Call your local or state health department.  Contact the Centers for Disease Control and Prevention (CDC):  - Call 1-763.818.2401 (1-800-CDC-INFO) or  - Visit CDCs website at www.cdc.gov/vaccines    Vaccine Information Statement (Interim)  Tdap (Tetanus, Diphtheria, Pertussis) Vaccine  04/01/2020  [address]iela [id number]   Department of Health and Human Services  Centers for Disease Control and Prevention    Office Use Only

## 2020-07-22 NOTE — PROGRESS NOTES
Dario Olmedo  Identified pt with two pt identifiers(name and ). Chief Complaint   Patient presents with    Cholesterol Problem     Room 2A //     Vitamin D Deficiency       Reviewed record In preparation for visit and have obtained necessary documentation. 1. Have you been to the ER, urgent care clinic or hospitalized since your last visit? No     2. Have you seen or consulted any other health care providers outside of the [address] [place] [address] since your last visit? Include any pap smears or colon screening. No    Patient has an advance directive. Vitals reviewed with provider. Health Maintenance reviewed: After verbal order read back of , patient received TDAP  in left arm. Boostrix 0.5ml [facility] 16720-108-14 lot [id number] exp 2022. Patient tolerated procedure without complaints and received VIS.      Health Maintenance Due   Topic    GLAUCOMA SCREENING Q2Y     Medicare Yearly Exam           Wt Readings from Last 3 Encounters:   20 101 lb 12.8 oz (46.2 kg)   20 109 lb (49.4 kg)   20 107 lb 12.8 oz (48.9 kg)        Temp Readings from Last 3 Encounters:   20 99 °F (37.2 °C) (Oral)   20 98 °F (36.7 °C) (Oral)   20 98.4 °F (36.9 °C) (Oral)        BP Readings from Last 3 Encounters:   20 127/69   20 135/75   20 132/78        Pulse Readings from Last 3 Encounters:   20 78   20 73   20 85        Vitals:    20 1501   BP: 127/69   Pulse: 78   Resp: 14   Temp: 99 °F (37.2 °C)   TempSrc: Oral   SpO2: 98%   Weight: 101 lb 12.8 oz (46.2 kg)   Height: 5' 2\" (1.575 m)   PainSc:   0 - No pain          Learning Assessment:   :       Learning Assessment 2014   PRIMARY LEARNER Patient   HIGHEST LEVEL OF EDUCATION - PRIMARY LEARNER  DID NOT GRADUATE HIGH SCHOOL   BARRIERS PRIMARY LEARNER NONE   CO-LEARNER CAREGIVER No   PRIMARY LANGUAGE ENGLISH   LEARNER PREFERENCE PRIMARY DEMONSTRATION   ANSWERED BY patient RELATIONSHIP SELF        Depression Screening:   :       3 most recent PHQ Screens 1/23/2020   Little interest or pleasure in doing things Not at all   Feeling down, depressed, irritable, or hopeless Not at all   Total Score PHQ 2 0        Fall Risk Assessment:   :       Fall Risk Assessment, last 12 mths 7/22/2020   Able to walk? Yes   Fall in past 12 months? Yes   Fall with injury? No   Number of falls in past 12 months 1   Fall Risk Score 1        Abuse Screening:   :       Abuse Screening Questionnaire 1/23/2020 5/8/2019 11/7/2017 11/10/2016 6/6/2014   Do you ever feel afraid of your partner? N N N N N   Are you in a relationship with someone who physically or mentally threatens you? N N N N N   Is it safe for you to go home?  Y Y Y Y Y        ADL Screening:   :       ADL Assessment 11/7/2017   Feeding yourself No Help Needed   Getting from bed to chair No Help Needed   Getting dressed No Help Needed   Bathing or showering No Help Needed   Walk across the room (includes cane/walker) No Help Needed   Using the telphone No Help Needed   Taking your medications No Help Needed   Preparing meals No Help Needed   Managing money (expenses/bills) No Help Needed   Moderately strenuous housework (laundry) No Help Needed   Shopping for personal items (toiletries/medicines) No Help Needed   Shopping for groceries No Help Needed   Driving No Help Needed   Climbing a flight of stairs No Help Needed   Getting to places beyond walking distances No Help Needed

## 2020-08-10 ENCOUNTER — TELEPHONE (OUTPATIENT)
Dept: INTERNAL MEDICINE CLINIC | Age: 85
End: 2020-08-10

## 2020-08-10 NOTE — TELEPHONE ENCOUNTER
Pt's daughter, Mirta Smith, called and requested a call back from the nurse to discuss a wound on her mother's left leg that Dr. Norma Anderson checked on at her last visit. Other wounds appear to be healing, this one seems to be staying the same, leg is slightly swollen. Please give Mirta Smith a call back at [phone] to discuss potential treatment/appt needed.

## 2020-08-10 NOTE — TELEPHONE ENCOUNTER
Spoke with [employer](on [employer]). Patient has cut about 1-1.5 inches across on left leg. She has swelling in that leg that goes to ankle. Cut is half way down leg. She has oozing and redness 3-4 inches below cut and 1 inch above cut. Per daughter patient will refused  [facility] as suggested. No in person visits available today and patients daughter cannot be with patient to assist with virtual visit. Please advise.

## 2020-08-11 ENCOUNTER — OFFICE VISIT (OUTPATIENT)
Dept: INTERNAL MEDICINE CLINIC | Age: 85
End: 2020-08-11
Payer: MEDICARE

## 2020-08-11 VITALS
WEIGHT: 101.8 LBS | HEIGHT: 62 IN | TEMPERATURE: 97.8 F | HEART RATE: 72 BPM | SYSTOLIC BLOOD PRESSURE: 122 MMHG | DIASTOLIC BLOOD PRESSURE: 70 MMHG | BODY MASS INDEX: 18.74 KG/M2 | OXYGEN SATURATION: 98 % | RESPIRATION RATE: 18 BRPM

## 2020-08-11 DIAGNOSIS — L97.929: Primary | ICD-10-CM

## 2020-08-11 DIAGNOSIS — L03.116 CELLULITIS OF LEFT LOWER LEG: ICD-10-CM

## 2020-08-11 PROCEDURE — G8420 CALC BMI NORM PARAMETERS: HCPCS | Performed by: INTERNAL MEDICINE

## 2020-08-11 PROCEDURE — 3288F FALL RISK ASSESSMENT DOCD: CPT | Performed by: INTERNAL MEDICINE

## 2020-08-11 PROCEDURE — G8427 DOCREV CUR MEDS BY ELIG CLIN: HCPCS | Performed by: INTERNAL MEDICINE

## 2020-08-11 PROCEDURE — G8536 NO DOC ELDER MAL SCRN: HCPCS | Performed by: INTERNAL MEDICINE

## 2020-08-11 PROCEDURE — 1100F PTFALLS ASSESS-DOCD GE2>/YR: CPT | Performed by: INTERNAL MEDICINE

## 2020-08-11 PROCEDURE — 1090F PRES/ABSN URINE INCON ASSESS: CPT | Performed by: INTERNAL MEDICINE

## 2020-08-11 PROCEDURE — 99213 OFFICE O/P EST LOW 20 MIN: CPT | Performed by: INTERNAL MEDICINE

## 2020-08-11 PROCEDURE — G8432 DEP SCR NOT DOC, RNG: HCPCS | Performed by: INTERNAL MEDICINE

## 2020-08-11 RX ORDER — CEPHALEXIN 250 MG/1
250 CAPSULE ORAL 4 TIMES DAILY
Qty: 28 CAP | Refills: 0 | Status: SHIPPED | OUTPATIENT
Start: 2020-08-11 | End: 2020-08-11 | Stop reason: ALTCHOICE

## 2020-08-11 RX ORDER — CEPHALEXIN 250 MG/1
250 CAPSULE ORAL 4 TIMES DAILY
Qty: 28 CAP | Refills: 0 | Status: SHIPPED | OUTPATIENT
Start: 2020-08-11 | End: 2020-08-18

## 2020-08-11 NOTE — PROGRESS NOTES
Crescencio Dunbar is a 80 y.o. female  Chief Complaint   Patient presents with    Leg Pain     possible leg infection     Health Maintenance Due   Topic Date Due    GLAUCOMA SCREENING Q2Y  10/01/2018    Influenza Age 5 to Adult  08/01/2020     Visit Vitals  /70 (BP 1 Location: Left arm, BP Patient Position: Sitting)   Pulse 72   Temp 97.8 °F (36.6 °C) (Oral)   Resp 18   Ht 5' 2\" (1.575 m)   Wt 101 lb 12.8 oz (46.2 kg)   SpO2 98%   BMI 18.62 kg/m²     1. Have you been to the ER, urgent care clinic since your last visit? Hospitalized since your last visit? No     2. Have you seen or consulted any other health care providers outside of the [facility] since your last visit? Include any pap smears or colon screening.   No     Room #:  C

## 2020-08-11 NOTE — PROGRESS NOTES
CC:   Chief Complaint   Patient presents with    Leg Pain     possible leg infection       HISTORY OF PRESENT ILLNESS  Carlitos Henderson is a 80 y.o. female. Patient complains of possible infected area on her left lower leg. Started after she accidentally fell about 1 month ago, had scratches and bruises that all healed except for one area at left lower leg. No discharge but tender. Initially had 5/10 pain at area with walking but now no pain. Putting neosporin and a bandage on it. Has been slowly getting better. Denies fevers or chills. 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 0XD    Caregiver stress Z63.6    Wears hearing aid Z97.4     Past Medical History:   Diagnosis Date    Arthritis     GERD (gastroesophageal reflux disease)     Hx-TIA (transient ischemic attack) 2006    Hypercholesterolemia     Osteoporosis     Other ill-defined conditions(799.89)     high cholesterol    S/P colonoscopy 2001     Allergies   Allergen Reactions    Lipitor [Atorvastatin] Myalgia    Pravastatin Myalgia    Zetia [Ezetimibe] Other (comments)     itching       Current Outpatient Medications   Medication Sig Dispense Refill    pantoprazole (PROTONIX) 40 mg tablet Take 1 Tab by mouth daily. 90 Tab 3    pravastatin (PRAVACHOL) 20 mg tablet TAKE 1 TABLET EVERY DAY 90 Tab 3    Cholecalciferol, Vitamin D3, (VITAMIN D3) 2,000 unit cap capsule Take 2,000 Units by mouth daily. 90 Cap 4    ACETAMINOPHEN (TYLENOL PO) Take  by mouth as needed.            PHYSICAL EXAM  Visit Vitals  /70 (BP 1 Location: Left arm, BP Patient Position: Sitting)   Pulse 72   Temp 97.8 °F (36.6 °C) (Oral)   Resp 18   Ht 5' 2\" (1.575 m)   Wt 101 lb 12.8 oz (46.2 kg)   SpO2 98%   BMI 18.62 kg/m²       General: Elderly, no distress. Ambulates with a cane. HEENT:  Head normocephalic/atraumatic, no scleral icterus  Extremities: At anterior left lower leg just above ankle, 1 cm ulcer with granulation tissue surrounded by 2 cm area of erythema with mild warmth and tenderness. No discharge from ulcer. Neurological: Alert and oriented. ASSESSMENT AND PLAN    ICD-10-CM ICD-9-CM    1. Traumatic ulcer of left lower leg, unspecified ulcer stage ([facility])  L97.929 707.19 cephALEXin (KEFLEX) 250 mg capsule      DISCONTINUED: cephALEXin (KEFLEX) 250 mg capsule   2. Cellulitis of left lower leg  L03.116 682.6 cephALEXin (KEFLEX) 250 mg capsule      DISCONTINUED: cephALEXin (KEFLEX) 250 mg capsule     Diagnoses and all orders for this visit:    1. Traumatic ulcer of left lower leg, unspecified ulcer stage ([facility] [address]  Keep area clean and dry. Okay to use Neosporin cream.  Do not use hydrogen peroxide on it because it can delay healing (she has not been using this). -     cephALEXin (KEFLEX) 250 mg capsule; Take 1 Cap by mouth four (4) times daily for 7 days. 2. Cellulitis of left lower leg  -     cephALEXin (KEFLEX) 250 mg capsule; Take 1 Cap by mouth four (4) times daily for 7 days. Follow-up and Dispositions    · Return if symptoms worsen or fail to improve, for Scheduled appointment with Dr. Uziel Cordon on 1/18/2021. I have discussed the diagnosis with the patient and the intended plan as seen in the above orders. Patient is in agreement. The patient has received an after-visit summary and questions were answered concerning future plans. I have discussed medication side effects and warnings with the patient as well.

## 2020-08-11 NOTE — PATIENT INSTRUCTIONS

## 2020-08-24 ENCOUNTER — TELEPHONE (OUTPATIENT)
Dept: INTERNAL MEDICINE CLINIC | Age: 85
End: 2020-08-24

## 2020-08-24 ENCOUNTER — OFFICE VISIT (OUTPATIENT)
Dept: INTERNAL MEDICINE CLINIC | Age: 85
End: 2020-08-24
Payer: MEDICARE

## 2020-08-24 VITALS
SYSTOLIC BLOOD PRESSURE: 108 MMHG | DIASTOLIC BLOOD PRESSURE: 62 MMHG | BODY MASS INDEX: 18.77 KG/M2 | RESPIRATION RATE: 16 BRPM | HEART RATE: 76 BPM | TEMPERATURE: 98.3 F | OXYGEN SATURATION: 97 % | WEIGHT: 102 LBS | HEIGHT: 62 IN

## 2020-08-24 DIAGNOSIS — L03.116 CELLULITIS OF LEFT LOWER EXTREMITY: ICD-10-CM

## 2020-08-24 DIAGNOSIS — L97.929 ULCER OF LEFT LOWER EXTREMITY, UNSPECIFIED ULCER STAGE (HCC): Primary | ICD-10-CM

## 2020-08-24 DIAGNOSIS — R60.0 LOCALIZED EDEMA: ICD-10-CM

## 2020-08-24 PROCEDURE — G8536 NO DOC ELDER MAL SCRN: HCPCS | Performed by: INTERNAL MEDICINE

## 2020-08-24 PROCEDURE — G8432 DEP SCR NOT DOC, RNG: HCPCS | Performed by: INTERNAL MEDICINE

## 2020-08-24 PROCEDURE — 1090F PRES/ABSN URINE INCON ASSESS: CPT | Performed by: INTERNAL MEDICINE

## 2020-08-24 PROCEDURE — G8427 DOCREV CUR MEDS BY ELIG CLIN: HCPCS | Performed by: INTERNAL MEDICINE

## 2020-08-24 PROCEDURE — 99214 OFFICE O/P EST MOD 30 MIN: CPT | Performed by: INTERNAL MEDICINE

## 2020-08-24 PROCEDURE — 3288F FALL RISK ASSESSMENT DOCD: CPT | Performed by: INTERNAL MEDICINE

## 2020-08-24 PROCEDURE — G8420 CALC BMI NORM PARAMETERS: HCPCS | Performed by: INTERNAL MEDICINE

## 2020-08-24 PROCEDURE — 1100F PTFALLS ASSESS-DOCD GE2>/YR: CPT | Performed by: INTERNAL MEDICINE

## 2020-08-24 RX ORDER — FUROSEMIDE 20 MG/1
20 TABLET ORAL
Qty: 10 TAB | Refills: 0 | Status: SHIPPED | OUTPATIENT
Start: 2020-08-24 | End: 2020-09-02 | Stop reason: SDUPTHER

## 2020-08-24 NOTE — PROGRESS NOTES
HPI  Ms. Vasiliy Riley is a 80y.o. year old female, she is seen today for left leg cellulitis. Donnamaria Cramp about 2 mos ago. All wounds from fall have healed except left leg. Has been wearing compression stocking but swelling in foot got worse with it. Seen on 8/11 by Dr. Nikko Gardiner who prescribed keflex 250mg qid x 7 days for ulcer and cellulitis and still hasn't healed. Pain in the evening with some discharge - yellowish - no f/c. Has been putting neosporin on area. Area has been swollen more than usual but goes up and down. Chief Complaint   Patient presents with    Skin Infection     Room 2A // Left Foot // Same fall from last visit // Every other spot has healed except for the one on the foot // Took the antibiotics // Some swelling // Pain at night         Prior to Admission medications    Medication Sig Start Date End Date Taking? Authorizing Provider   vit A/vit C/vit E/zinc/copper (PRESERVISION AREDS PO) Take  by mouth. Yes Provider, Historical   furosemide (LASIX) 20 mg tablet Take 1 Tab by mouth daily as needed (swelling). 8/24/20  Yes Mulugeta Diaz MD   pantoprazole (PROTONIX) 40 mg tablet Take 1 Tab by mouth daily. 7/22/20  Yes Mulugeta Diaz MD   pravastatin (PRAVACHOL) 20 mg tablet TAKE 1 TABLET EVERY DAY 7/22/20  Yes Mulugeta Diaz MD   Cholecalciferol, Vitamin D3, (VITAMIN D3) 2,000 unit cap capsule Take 2,000 Units by mouth daily. 11/7/17  Yes Mulugeta Diaz MD   ACETAMINOPHEN (TYLENOL PO) Take  by mouth as needed.    Yes Provider, Historical         Allergies   Allergen Reactions    Lipitor [Atorvastatin] Myalgia    Pravastatin Myalgia    Zetia [Ezetimibe] Other (comments)     itching         REVIEW OF SYSTEMS:  Per HPI    PHYSICAL EXAM:  Visit Vitals  /62 (BP 1 Location: Left arm, BP Patient Position: Sitting)   Pulse 76   Temp 98.3 °F (36.8 °C) (Oral)   Resp 16   Ht 5' 2\" (1.575 m)   Wt 102 lb (46.3 kg)   SpO2 97%   BMI 18.66 kg/m²     Constitutional: Appears well-developed and well-nourished. No distress. HENT:   Head: Normocephalic and atraumatic. Eyes: No scleral icterus. Cardiovascular: Normal S1/S2, regular rhythm. No murmurs, rubs, or gallops. Pulmonary/Chest: Effort normal and breath sounds normal. No respiratory distress. No wheezes, rhonchi, or rales. Ext: 1+ left lower leg edema with 1+ pedal pulse. Skin: approx 1x0.5cm ulcer left lower leg above ankle anteriorly with tenderness and erythema, scant yellow discharge on bandage  Neurological: Alert. Psychiatric: Normal mood and affect. Behavior is normal.     Lab Results   Component Value Date/Time    Sodium 142 07/16/2020 09:58 AM    Potassium 4.8 07/16/2020 09:58 AM    Chloride 101 07/16/2020 09:58 AM    CO2 25 07/16/2020 09:58 AM    Anion gap 5 06/11/2019 03:02 AM    Glucose 98 07/16/2020 09:58 AM    BUN 18 07/16/2020 09:58 AM    Creatinine 0.93 07/16/2020 09:58 AM    BUN/Creatinine ratio 19 07/16/2020 09:58 AM    GFR est AA 63 07/16/2020 09:58 AM    GFR est non-AA 55 (L) 07/16/2020 09:58 AM    Calcium 9.3 07/16/2020 09:58 AM    Bilirubin, total 0.5 07/16/2020 09:58 AM    Alk. phosphatase 46 07/16/2020 09:58 AM    Protein, total 7.0 07/16/2020 09:58 AM    Albumin 4.7 (H) 07/16/2020 09:58 AM    Globulin 3.5 06/08/2019 05:52 PM    A-G Ratio 2.0 07/16/2020 09:58 AM    ALT (SGPT) 11 07/16/2020 09:58 AM     No results found for: HBA1C, HGBE8, QXC2JDWK, ESU4TMNV   Lab Results   Component Value Date/Time    Cholesterol, total 191 07/16/2020 09:58 AM    HDL Cholesterol 85 07/16/2020 09:58 AM    LDL, calculated 91 07/16/2020 09:58 AM    VLDL, calculated 15 07/16/2020 09:58 AM    Triglyceride 74 07/16/2020 09:58 AM    CHOL/HDL Ratio 2.6 10/12/2010 08:57 AM          ASSESSMENT/PLAN  Diagnoses and all orders for this visit:    1. Ulcer of left lower extremity, unspecified ulcer stage ([facility] [address]    2. Localized edema  -     furosemide (LASIX) 20 mg tablet; Take 1 Tab by mouth daily as needed (swelling).     3. Cellulitis of left lower extremity    will take second 7 day course of keflex she has at home  Elevate legs, add lasix 20mg daily  If not better at follow up refer to wound care clinic      Health Maintenance Due   Topic Date Due    GLAUCOMA SCREENING Q2Y  10/01/2018        Follow-up and Dispositions    · Return in about 1 week (around 8/31/2020) for ulcer - in person. Reviewed plan of care. Patient has provided input and agrees with goals. The nurse provided the patient and/or family with advanced directive information if needed and encouraged the patient to provide a copy to the office when available.

## 2020-08-24 NOTE — PROGRESS NOTES
Douglas Hagan  Identified pt with two pt identifiers(name and ). Chief Complaint   Patient presents with    Skin Infection     Room 2A // Left Foot // Same fall from last visit // Every other spot has healed except for the one on the foot // Took the antibiotics // Some swelling // Pain at night        Reviewed record In preparation for visit and have obtained necessary documentation. 1. Have you been to the ER, urgent care clinic or hospitalized since your last visit? No     2. Have you seen or consulted any other health care providers outside of the [address] [place] [address] since your last visit? Include any pap smears or colon screening. No    Patient does not have an advance directive. Vitals reviewed with provider.     Health Maintenance reviewed:     Health Maintenance Due   Topic    GLAUCOMA SCREENING Q2Y           Wt Readings from Last 3 Encounters:   20 102 lb (46.3 kg)   20 101 lb 12.8 oz (46.2 kg)   20 101 lb 12.8 oz (46.2 kg)        Temp Readings from Last 3 Encounters:   20 98.3 °F (36.8 °C) (Oral)   20 97.8 °F (36.6 °C) (Oral)   20 99 °F (37.2 °C) (Oral)        BP Readings from Last 3 Encounters:   20 108/62   20 122/70   20 127/69        Pulse Readings from Last 3 Encounters:   20 76   20 72   20 78        Vitals:    20 0959   BP: 108/62   Pulse: 76   Resp: 16   Temp: 98.3 °F (36.8 °C)   TempSrc: Oral   SpO2: 97%   Weight: 102 lb (46.3 kg)   Height: 5' 2\" (1.575 m)   PainSc:   0 - No pain          Learning Assessment:   :       Learning Assessment 2014   PRIMARY LEARNER Patient   HIGHEST LEVEL OF EDUCATION - PRIMARY LEARNER  DID NOT GRADUATE HIGH SCHOOL   BARRIERS PRIMARY LEARNER NONE   CO-LEARNER CAREGIVER No   PRIMARY LANGUAGE ENGLISH   LEARNER PREFERENCE PRIMARY DEMONSTRATION   ANSWERED BY patient   RELATIONSHIP SELF        Depression Screening:   :       3 most recent PHQ Screens 2020   Little interest or pleasure in doing things Not at all   Feeling down, depressed, irritable, or hopeless Not at all   Total Score PHQ 2 0        Fall Risk Assessment:   :       Fall Risk Assessment, last 12 mths 8/11/2020   Able to walk? Yes   Fall in past 12 months? Yes   Fall with injury? Yes   Number of falls in past 12 months 1   Fall Risk Score 2        Abuse Screening:   :       Abuse Screening Questionnaire 1/23/2020 5/8/2019 11/7/2017 11/10/2016 6/6/2014   Do you ever feel afraid of your partner? N N N N N   Are you in a relationship with someone who physically or mentally threatens you? N N N N N   Is it safe for you to go home?  Y Y Y Y Y        ADL Screening:   :       ADL Assessment 11/7/2017   Feeding yourself No Help Needed   Getting from bed to chair No Help Needed   Getting dressed No Help Needed   Bathing or showering No Help Needed   Walk across the room (includes cane/walker) No Help Needed   Using the telphone No Help Needed   Taking your medications No Help Needed   Preparing meals No Help Needed   Managing money (expenses/bills) No Help Needed   Moderately strenuous housework (laundry) No Help Needed   Shopping for personal items (toiletries/medicines) No Help Needed   Shopping for groceries No Help Needed   Driving No Help Needed   Climbing a flight of stairs No Help Needed   Getting to places beyond walking distances No Help Needed

## 2020-08-24 NOTE — TELEPHONE ENCOUNTER
Pt's granddaughter, Leland Rudd, called and scheduled an appt for Wednesday regarding the pt's possible leg infection. Caller would like a call back to discuss what they can do in the meantime, declined to visit urgent care, requesting antibiotics. Please return call at [phone].

## 2020-08-31 ENCOUNTER — TELEPHONE (OUTPATIENT)
Dept: INTERNAL MEDICINE CLINIC | Age: 85
End: 2020-08-31

## 2020-08-31 DIAGNOSIS — L97.929: Primary | ICD-10-CM

## 2020-08-31 NOTE — TELEPHONE ENCOUNTER
Pt daughter Rigo Campbell called and would like for the nurse to please call her about the pt appt Wednesday.

## 2020-08-31 NOTE — TELEPHONE ENCOUNTER
The patient's daughter Arch Brian, on [facility]) called and verified the patient by name and date of birth. She stated the patient had a fall about a month and a half ago and the leg is not getting any better. It looks terrible and does not know what to do. The patient cannot put her compression socks on because of the patient and now the patient's feet is swelling. The patient is now complaining about ankle pain and not being able to put pressure on it. The patient has been through 2 round of antibiotics and the wound is still not healing. The daughter wants to know what to do. The patient is scheduled for an appointment on Wednesday. Alyx's number is: [phone].

## 2020-08-31 NOTE — TELEPHONE ENCOUNTER
I called Denisse Hartley and verified the patient by name and date of birth. I informed her on the message from Dr. Darien Arango. I informed her that I would call her tomorrow once I get the information for the wound care. She stated understanding and wanted to know what to do about the swelling. I informed her that Dr. Darien Arango did not mention anything about the swelling and I would ask and give them a call back tomorrow. She stated understanding and had no further questions.

## 2020-09-01 NOTE — TELEPHONE ENCOUNTER
Denisse Hartley called back and verified the patient by name and date of birth. I informed Cruzito Martinez on the message from Dr. Darien Arango. I also informed her on the wound care appointment information. The patient is scheduled for Wednesday, September 16 at 9:30am at the [id number] [place] [address] location. I informed her that I would call and see if I could find another wound care facility and one that comes to the home. [facility] to give me a call back since that is closer to the location Cruzito Martinez requested.

## 2020-09-01 NOTE — TELEPHONE ENCOUNTER
Try [facility] wound clinic first, for edema may increase furosemide to 40mg daily.

## 2020-09-01 NOTE — TELEPHONE ENCOUNTER
I called Eusebiamax Frank and verified the patient by name and date of birth. I informed her that I am waiting to hear back from [facility] wound care facility and that they could try dispatch health. I informed them on the number and they are going to see what the patient would like to do first. The patient has an appointment schedule with Dr. Catia Levi for tomorrow at 1pm. I also informed her that I was going to contact someone within the wound care facility to see if the appointment can be moved to a sooner date.

## 2020-09-02 ENCOUNTER — OFFICE VISIT (OUTPATIENT)
Dept: INTERNAL MEDICINE CLINIC | Age: 85
End: 2020-09-02
Payer: MEDICARE

## 2020-09-02 VITALS
BODY MASS INDEX: 18.29 KG/M2 | HEIGHT: 62 IN | RESPIRATION RATE: 16 BRPM | SYSTOLIC BLOOD PRESSURE: 125 MMHG | HEART RATE: 83 BPM | WEIGHT: 99.4 LBS | DIASTOLIC BLOOD PRESSURE: 71 MMHG | TEMPERATURE: 98.7 F | OXYGEN SATURATION: 96 %

## 2020-09-02 DIAGNOSIS — R60.0 LOCALIZED EDEMA: ICD-10-CM

## 2020-09-02 DIAGNOSIS — L97.921 TRAUMATIC ULCER OF LEFT LOWER LEG, LIMITED TO BREAKDOWN OF SKIN (HCC): ICD-10-CM

## 2020-09-02 DIAGNOSIS — M79.605 LEFT LEG PAIN: Primary | ICD-10-CM

## 2020-09-02 PROCEDURE — G8427 DOCREV CUR MEDS BY ELIG CLIN: HCPCS | Performed by: INTERNAL MEDICINE

## 2020-09-02 PROCEDURE — G8536 NO DOC ELDER MAL SCRN: HCPCS | Performed by: INTERNAL MEDICINE

## 2020-09-02 PROCEDURE — 99213 OFFICE O/P EST LOW 20 MIN: CPT | Performed by: INTERNAL MEDICINE

## 2020-09-02 PROCEDURE — G8510 SCR DEP NEG, NO PLAN REQD: HCPCS | Performed by: INTERNAL MEDICINE

## 2020-09-02 PROCEDURE — G8419 CALC BMI OUT NRM PARAM NOF/U: HCPCS | Performed by: INTERNAL MEDICINE

## 2020-09-02 PROCEDURE — 3288F FALL RISK ASSESSMENT DOCD: CPT | Performed by: INTERNAL MEDICINE

## 2020-09-02 PROCEDURE — 1090F PRES/ABSN URINE INCON ASSESS: CPT | Performed by: INTERNAL MEDICINE

## 2020-09-02 PROCEDURE — 1100F PTFALLS ASSESS-DOCD GE2>/YR: CPT | Performed by: INTERNAL MEDICINE

## 2020-09-02 NOTE — PATIENT INSTRUCTIONS
Wound Care: After Your Visit Your Care Instructions Taking good care of your wound at home will help it heal quickly and reduce your chance of infection. The doctor has checked you carefully, but problems can develop later. If you notice any problems or new symptoms, get medical treatment right away. Follow-up care is a key part of your treatment and safety. Be sure to make and go to all appointments, and call your doctor if you are having problems. It's also a good idea to know your test results and keep a list of the medicines you take. How can you care for yourself at home? · Clean the area with soap and water 2 times a day unless your doctor gives you different instructions. Don't use hydrogen peroxide or alcohol, which can slow healing. ¨ You may cover the wound with a thin layer of antibiotic ointment, such as bacitracin, and a nonstick bandage. ¨ Apply more ointment and replace the bandage as needed. · Take pain medicines exactly as directed. Some pain is normal with a wound, but do not ignore pain that is getting worse instead of better. You could have an infection. ¨ If the doctor gave you a prescription medicine for pain, take it as prescribed. ¨ If you are not taking a prescription pain medicine, ask your doctor if you can take an over-the-counter medicine. · Your doctor may have closed your wound with stitches (sutures), staples, or skin glue. ¨ If you have stitches, your doctor may remove them after several days to 2 weeks. Or you may have stitches that dissolve on their own. ¨ If you have staples, your doctor may remove them after 7 to 10 days. ¨ If your wound was closed with skin glue, the glue will wear off in a few days to 2 weeks. When should you call for help? Call your doctor now or seek immediate medical care if: 
· You have signs of infection, such as: 
¨ Increased pain, swelling, warmth, or redness near the wound. ¨ Red streaks leading from the wound. ¨ Pus draining from the wound. ¨ A fever. · You bleed so much from your incision that you soak one or more bandages over 2 to 4 hours. Watch closely for changes in your health, and be sure to contact your doctor if: · The wound is not getting better each day. Where can you learn more? Go to [employer] Enter A766 in the search box to learn more about \"Wound Care: After Your Visit. \"  
© 0698-3834 [employer], [employer]. Care instructions adapted under license by [facility] (which disclaims liability or warranty for this information). This care instruction is for use with your licensed healthcare professional. If you have questions about a medical condition or this instruction, always ask your healthcare professional. [address] any warranty or liability for your use of this information. Content Version: 76.6.951367; Last Revised: April 23, 2012

## 2020-09-02 NOTE — PROGRESS NOTES
Chief Complaint   Patient presents with    Ulcer     1 week f/u for wound on leg     1. Have you been to the ER, urgent care clinic since your last visit? Hospitalized since your last visit? No    2. Have you seen or consulted any other health care providers outside of the [facility] since your last visit? Include any pap smears or colon screening.  No    Visit Vitals  /71 (BP 1 Location: Right arm, BP Patient Position: Sitting)   Pulse 83   Temp 98.7 °F (37.1 °C) (Oral)   Resp 16   Ht 5' 2\" (1.575 m)   Wt 99 lb 6.4 oz (45.1 kg)   SpO2 96%   BMI 18.18 kg/m²

## 2020-09-02 NOTE — PROGRESS NOTES
HPI  Ms. Carlitos Henderson is a 80y.o. year old female, she is seen today for follow up left leg ulcer. Today is a bit better, some days worse than others. Has taken keflex x 14 days, wound present since falling weeks ago. Has had pain on left lower lateral leg last few days. She increased lasix to 40mg daily yesterday at my instruction as swelling was worse. She isn't really cleaning area much. Applies antibiotic ointment and bandaid - last changed last night. No f/c, no n/v.   Chief Complaint   Patient presents with    Ulcer     1 week f/u for wound on leg        Prior to Admission medications    Medication Sig Start Date End Date Taking? Authorizing Provider   vit A/vit C/vit E/zinc/copper (PRESERVISION AREDS PO) Take  by mouth. Yes Provider, Historical   pantoprazole (PROTONIX) 40 mg tablet Take 1 Tab by mouth daily. 7/22/20  Yes Judi Hay MD   pravastatin (PRAVACHOL) 20 mg tablet TAKE 1 TABLET EVERY DAY 7/22/20  Yes Judi Hay MD   Cholecalciferol, Vitamin D3, (VITAMIN D3) 2,000 unit cap capsule Take 2,000 Units by mouth daily. 11/7/17  Yes Judi Hay MD   ACETAMINOPHEN (TYLENOL PO) Take  by mouth as needed. Yes Provider, Historical   furosemide (LASIX) 40 mg tablet Take 1 Tab by mouth daily as needed (swelling). 9/3/20   Judi Hay MD         Allergies   Allergen Reactions    Lipitor [Atorvastatin] Myalgia    Pravastatin Myalgia    Zetia [Ezetimibe] Other (comments)     itching         REVIEW OF SYSTEMS:  Per HPI    PHYSICAL EXAM:  Visit Vitals  /71 (BP 1 Location: Right arm, BP Patient Position: Sitting)   Pulse 83   Temp 98.7 °F (37.1 °C) (Oral)   Resp 16   Ht 5' 2\" (1.575 m)   Wt 99 lb 6.4 oz (45.1 kg)   SpO2 96%   BMI 18.18 kg/m²     Constitutional: Appears well-developed and well-nourished. No distress. HENT:   Head: Normocephalic and atraumatic. Eyes: No scleral icterus.    Ext: trace to 1+ edema left ankle with approx 2cm shallow ulcer with slough in center, +tenderness above ulcer with erythematous skin surrounding. Neurological: Alert. Psychiatric: Normal mood and affect. Behavior is normal.     Lab Results   Component Value Date/Time    Sodium 142 07/16/2020 09:58 AM    Potassium 4.8 07/16/2020 09:58 AM    Chloride 101 07/16/2020 09:58 AM    CO2 25 07/16/2020 09:58 AM    Anion gap 5 06/11/2019 03:02 AM    Glucose 98 07/16/2020 09:58 AM    BUN 18 07/16/2020 09:58 AM    Creatinine 0.93 07/16/2020 09:58 AM    BUN/Creatinine ratio 19 07/16/2020 09:58 AM    GFR est AA 63 07/16/2020 09:58 AM    GFR est non-AA 55 (L) 07/16/2020 09:58 AM    Calcium 9.3 07/16/2020 09:58 AM    Bilirubin, total 0.5 07/16/2020 09:58 AM    Alk. phosphatase 46 07/16/2020 09:58 AM    Protein, total 7.0 07/16/2020 09:58 AM    Albumin 4.7 (H) 07/16/2020 09:58 AM    Globulin 3.5 06/08/2019 05:52 PM    A-G Ratio 2.0 07/16/2020 09:58 AM    ALT (SGPT) 11 07/16/2020 09:58 AM     No results found for: HBA1C, HGBE8, KYG1CDFT, BQD5SCQR   Lab Results   Component Value Date/Time    Cholesterol, total 191 07/16/2020 09:58 AM    HDL Cholesterol 85 07/16/2020 09:58 AM    LDL, calculated 91 07/16/2020 09:58 AM    VLDL, calculated 15 07/16/2020 09:58 AM    Triglyceride 74 07/16/2020 09:58 AM    CHOL/HDL Ratio 2.6 10/12/2010 08:57 AM          ASSESSMENT/PLAN  Diagnoses and all orders for this visit:    1. Left leg pain  -     XR TIB/FIB LT; Future    2. Traumatic ulcer of left lower leg, limited to breakdown of skin ([address]      Less likely any fracture but daughter is asking for xray order in case pain doesn't improve   Wound cleaned in office, appears improved  She as given instructions to clean bid with soap and water and leave open to air if not draining  Will follow up in wound clinic for difficult to heal ulcer    Health Maintenance Due   Topic Date Due    GLAUCOMA SCREENING Q2Y  10/01/2018    Flu Vaccine (1) 09/01/2020               Reviewed plan of care.  Patient has provided input and agrees with goals.     The nurse provided the patient and/or family with advanced directive information if needed and encouraged the patient to provide a copy to the office when available.

## 2020-09-02 NOTE — TELEPHONE ENCOUNTER
The patient came in today for an appointment with Dr. Hayley Fowler. The wound was cleaned and bandaged. Our  was able to get an appointment with the [facility] for 09/09. The patient and her family were made aware of this and had not further questions.

## 2020-09-03 RX ORDER — FUROSEMIDE 40 MG/1
40 TABLET ORAL
Qty: 30 TAB | Refills: 1 | Status: SHIPPED | OUTPATIENT
Start: 2020-09-03 | End: 2022-09-14

## 2020-09-08 ENCOUNTER — TELEPHONE (OUTPATIENT)
Dept: WOUND CARE | Age: 85
End: 2020-09-08

## 2020-09-08 NOTE — TELEPHONE ENCOUNTER
Attempted to call patient for COVID prescreen prior to appointment on 9/9/20. No answer. No voicemail set up.

## 2020-09-09 ENCOUNTER — HOSPITAL ENCOUNTER (OUTPATIENT)
Dept: WOUND CARE | Age: 85
Discharge: HOME | End: 2020-09-09
Payer: MEDICARE

## 2020-09-09 VITALS
SYSTOLIC BLOOD PRESSURE: 128 MMHG | RESPIRATION RATE: 18 BRPM | HEART RATE: 71 BPM | DIASTOLIC BLOOD PRESSURE: 79 MMHG | TEMPERATURE: 97.7 F

## 2020-09-09 PROCEDURE — 99212 OFFICE O/P EST SF 10 MIN: CPT

## 2020-09-09 PROCEDURE — 11042 DBRDMT SUBQ TIS 1ST 20SQCM/<: CPT

## 2020-09-09 NOTE — WOUND CARE
Jerel Bennett DPM - Clary Heath DPM         
                    
                            Podiatric Surgery - [facility] - H&P Assessment/Plan: 
 
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 - Wound stagnant with surrounding edema. 
- Wound debrided as noted in the Procedure Note to healthy granular bleeding margins. 
- Dressing consisting of aquacell DSD applied. 
- Compression achieved with double tubigrip - F/U 1 week. Subjective: 
Pt complains of wound to left anterior leg. States she feel on concrete went to her PCP who gave her 2 wks of Keflex. She has been wrapping it with neosporin and DSD daily. 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 Negative for fever, chills, nausea, vomiting, chest pain, shortness of breath. ROS: 
Consitutional: no weight loss, night sweats, fatigue / malaise / lethargy. Musculoskeletal: no joint / extremity pain, misalignment, stiffness, decreased ROM, crepitus. Integument: left anterior leg wound, No pruritis, rashes, lesions. Psychiatric: No depression, anxiety, paranoia History: 
Left ankle wound Allergies Allergen Reactions  Lipitor [Atorvastatin] Myalgia  Pravastatin Myalgia  Zetia [Ezetimibe] Other (comments)  
  itching Family History Problem Relation Age of Onset  Diabetes Mother  Hypertension Mother  Stroke Mother Past Medical History:  
Diagnosis Date  Arthritis  GERD (gastroesophageal reflux disease)  Hx-TIA (transient ischemic attack) 2006  Hypercholesterolemia  Osteoporosis  Other ill-defined conditions(059.89)   
 high cholesterol  S/P colonoscopy 2001 Past Surgical History: Procedure Laterality Date  HX ORTHOPAEDIC  2010  
 left hip replacement  MA COLONOSCOPY FLX DX W/COLLJ SPEC WHEN PFRMD  5/25/2012  STRESS TEST CARDIAC  2007  UPPER GI ENDOSCOPY,BIOPSY  6/10/2019 Social History Tobacco Use  Smoking status: Never Smoker  Smokeless tobacco: Never Used Substance Use Topics  Alcohol use: No  
   
Social History Substance and Sexual Activity Alcohol Use No  
 
Social History Substance and Sexual Activity Drug Use No  
  
Social History Tobacco Use Smoking Status Never Smoker Smokeless Tobacco Never Used Current Outpatient Medications Medication Sig  furosemide (LASIX) 40 mg tablet Take 1 Tab by mouth daily as needed (swelling).  vit A/vit C/vit E/zinc/copper (PRESERVISION AREDS PO) Take  by mouth.  pantoprazole (PROTONIX) 40 mg tablet Take 1 Tab by mouth daily.  pravastatin (PRAVACHOL) 20 mg tablet TAKE 1 TABLET EVERY DAY  Cholecalciferol, Vitamin D3, (VITAMIN D3) 2,000 unit cap capsule Take 2,000 Units by mouth daily.  ACETAMINOPHEN (TYLENOL PO) Take  by mouth as needed. No current facility-administered medications for this encounter. Objective: 
Visit Vitals /79 (BP 1 Location: Left arm, BP Patient Position: Sitting) Pulse 71 Temp 97.7 °F (36.5 °C) Resp 18 Vascular: B/L LE 
DP 2/4; PT 2/4 
capillary fill time brisk, pitting edema is present LLE, skin temperature is cool, varicosities are present B/L. Dermatological: 
 
Wound: 1 Location: left anterior medial leg Measurements: per RN note Margins: intact Drainage: serous Odor: mild Wound base: 100% granular Lymphangitic streaking? No. 
Undermining? No. 
Sinus tracts? No. 
Exposed bone? No. 
Subcutaneous crepitation on palpation? No. 
 
Nails are thickened, elongated, discolored, painful to palpation with subungual debris. There are extensive skin cracks at both heels. Skin is dry and scaly, exhibits hemosiderin deposition. There is no maceration of the interspaces of the feet b/l. Neurological: DTR are present, protective sensation per 5.07 Maskell Oscar monofilament is intact, patient is AAOx3, mood is normal. Epicritic sensation is intact. Orthopedic: B/L LE are symmetric, ROM of ankle, STJ, 1st MTPJ is limited, MMT 5 out of 5 for B/L LE. No amputation. Constitutional: Pt is a well developed, elderly thin female. Lymphatics: negative tenderness to palpation of neck/axillary/inguinal nodes. Procedure Note: 
Excisional debridement through level offat Location / Ulcer: left leg ulcer Indication: to remove non-viable tissue from wound bed. Consent in chart. Anesthesia: lidocaine gel 4% Instrument: brittany Residual necrosis: none Bleeding: minimal 
Hemostasis: pressure Pre-Procedure Pain: 0 Post-Procedure Pain: 0 Area debrided < 20 cm sq. Pre-Debridement measurements: see nursing notes Post-Debridement measurements: see nursing notes This is part of a series of staged procedures in an attempt at limb salvage.

## 2020-09-09 NOTE — DISCHARGE INSTRUCTIONS
Discharge Instructions for  [address], [address] [place] [address] Avenue  Telephone: [phone]    NAME:  Na Grialdo:  1/7/1932  MEDICAL RECORD NUMBER:  [id number]  DATE:  9/9/2020    Wound Cleansing:   Do not scrub or use excessive force. Cleanse wound prior to applying a clean dressing with:    [x] Cleanse wound with Mild Soap & Water      Topical Treatments:  Do not apply lotions, creams, or ointments to wound bed unless directed. [x] Apply moisturizing lotion to skin surrounding the wound prior to dressing change. Dressings:           Wound Location left lower leg       Clean wound with mild soap and water, dry, moisturize leg around wound with lotion or vaseline, apply aquacel ag to wound bed, cover with 4x4 gauze and roll gauze, secure with tape. Apply 2 layers of size E tubigrip to leg over dressing. Change every other day. Return to clinic in 2 weeks for followup. [x] Elevate leg(s) above the level of the heart when sitting. [x] Avoid prolonged standing in one place. Dietary:  [x] Diet as tolerated:   [x] Increase Protein:    Activity:  [x] Activity as tolerated:               Return Appointment:  [] Wound and dressing supply provider:   [] ECF or Home Healthcare:  [] Wound Assessment: [] Physician or NP scheduled for Wound Assessment:   [x] Return Appointment: With Dr. Vielka Jackson  in  [facility])  [] Ordered tests:      Electronically signed on 9/9/2020 at 2:39 PM     [address] [place] [address] Information: Should you experience any significant changes in your wound(s) or have questions about your wound care, please contact the [facility] at [address] [place] [address] 8:00 am - 4:30. If you need help with your wound outside these hours and cannot wait until we are again available, contact your PCP or go to the hospital emergency room.      PLEASE NOTE: IF YOU ARE UNABLE TO OBTAIN WOUND SUPPLIES, CONTINUE TO USE THE SUPPLIES YOU HAVE AVAILABLE UNTIL YOU ARE ABLE TO REACH US. IT IS MOST IMPORTANT TO KEEP THE WOUND COVERED AT ALL TIMES.       Physician Signature:_______________________    Date: ___________ Time:  ____________

## 2020-09-09 NOTE — WOUND CARE
09/09/20 1420 LLE Peripheral Vascular Capillary Refill Less than/equal to 3 seconds Color Pink Temperature Warm Sensation Present Pedal Pulse Palpable Circumference of Calf (cm) 30.5 cm Circumference of Ankle (cm) 22 cm  
RLE Peripheral Vascular Capillary Refill Less than/equal to 3 seconds Color Appropriate for race Temperature Cool Sensation Present Pedal Pulse Palpable Circumference of Calf (cm) 27.5 cm Circumference of Ankle (cm) 20.5 cm Ankle-Brachial Index Right Arm Systolic Pressure 752 mmHg Left Arm Systolic Pressure 864 Right Ankle Systolic Pressure: Posterior Tibial (PT) 134 mmHg Right Ankle Systolic Pressure: Dorsalis Pedis (DP) 144 mmHg Left Ankle Systolic Pressure: Posterior Tibial (PT) 140 mmHg Left Ankle Systolic Pressure: Dorsalis Pedis (DP) 142 mmHg Right VIV 1.12 mmHg Left VIV 1.11 mmHg Overall VIV (Lower VIV) 1.11 mmHg Wound Leg lower Left; Anterior #1 Date First Assessed/Time First Assessed: 09/09/20 1420   Present on Hospital Admission: Yes  Wound Approximate Age at First Assessment (Weeks): 6 weeks  Primary Wound Type: Venous Ulcer  Location: Leg lower  Wound Location Orientation: Left; Anterior  . .. Dressing Status Removed Dressing Type Adhesive wound dressing (Band-Aid) Wound Length (cm) 1.1 cm Wound Width (cm) 0.6 cm Wound Depth (cm) 0.1 cm Wound Surface Area (cm^2) 0.66 cm^2 Wound Volume (cm^3) 0.07 cm^3 Condition of [facility] Condition of [place] Rolled/curled Assessment Pink;Edema Drainage Amount Moderate Drainage Color Serous Wound Odor None Mady-wound Assessment Purple;Edema Cleansing and Cleansing Agents  Normal saline Visit Vitals /79 (BP 1 Location: Left arm, BP Patient Position: Sitting) Pulse 71 Temp 97.7 °F (36.5 °C) Resp 18

## 2020-09-09 NOTE — WOUND CARE
09/09/20 1455 Wound Leg lower Left; Anterior #1 Date First Assessed/Time First Assessed: 09/09/20 1420   Present on Hospital Admission: Yes  Wound Approximate Age at First Assessment (Weeks): 6 weeks  Primary Wound Type: Venous Ulcer  Location: Leg lower  Wound Location Orientation: Left; Anterior  . .. Dressing Changed Changed/New Dressing Type Applied Alginate;Silver products;Gauze wrap (cielo) (double tubi) Discharge Condition: Stable Pain: 0 Ambulatory Status: Walking and [place] Discharge Destination: Home Transportation: Car Accompanied by: Self Discharge instructions reviewed with Patient and copy or written instructions have been provided. All questions/concerns have been addressed at this time.

## 2020-09-18 ENCOUNTER — HOSPITAL ENCOUNTER (OUTPATIENT)
Dept: GENERAL RADIOLOGY | Age: 85
Discharge: HOME | End: 2020-09-18
Payer: MEDICARE

## 2020-09-18 DIAGNOSIS — M79.605 LEFT LEG PAIN: ICD-10-CM

## 2020-09-18 PROCEDURE — 73590 X-RAY EXAM OF LOWER LEG: CPT

## 2020-09-22 ENCOUNTER — TELEPHONE (OUTPATIENT)
Dept: WOUND CARE | Age: 85
End: 2020-09-22

## 2020-09-22 NOTE — TELEPHONE ENCOUNTER
Called patient for COVID prescreen prior to appointment on 9/23/2020. No Answer. Voicemail left with COVID prescreen questions, appointment time, facemask reminder, and call back number.

## 2020-09-23 ENCOUNTER — HOSPITAL ENCOUNTER (OUTPATIENT)
Dept: WOUND CARE | Age: 85
Discharge: HOME | End: 2020-09-23
Payer: MEDICARE

## 2020-09-23 VITALS — TEMPERATURE: 97.9 F | HEART RATE: 83 BPM | SYSTOLIC BLOOD PRESSURE: 118 MMHG | DIASTOLIC BLOOD PRESSURE: 75 MMHG

## 2020-09-23 PROCEDURE — 11042 DBRDMT SUBQ TIS 1ST 20SQCM/<: CPT

## 2020-09-23 NOTE — DISCHARGE INSTRUCTIONS
Discharge Instructions for  [address], [address] [place] [address] Avenue  Telephone: [phone]    NAME:  Kathy Schaeffer:  1/7/1932  MEDICAL RECORD NUMBER:  [id number]  DATE:  9/23/2020    Wound Cleansing:   Do not scrub or use excessive force. Cleanse wound prior to applying a clean dressing with:    [x] Cleanse wound with Mild Soap & Water      Topical Treatments:  Do not apply lotions, creams, or ointments to wound bed unless directed. [x] Apply moisturizing lotion to skin surrounding the wound prior to dressing change. Dressings:           Wound Location left lower leg       Clean wound with mild soap and water, dry, moisturize leg around wound with lotion or vaseline, apply eda ag to wound bed, cover with 4x4 gauze and roll gauze, secure with tape. Apply 2 layers of size E tubigrip to leg over dressing. Change every other day . Return to clinic in 2 weeks for followup. [x] Elevate leg(s) above the level of the heart when sitting. [x] Avoid prolonged standing in one place. Dietary:  [x] Diet as tolerated:   [x] Increase Protein:    Activity:  [x] Activity as tolerated:               Return Appointment:  [] Wound and dressing supply provider:   [] ECF or Home Healthcare:  [] Wound Assessment: [] Physician or NP scheduled for Wound Assessment:   [x] Return Appointment: With Dr. Mu Conroy  in  2 [facility])  [] Ordered tests:      Electronically signed on 9/23/2020 at 2:19 PM     [address] Information: Should you experience any significant changes in your wound(s) or have questions about your wound care, please contact the [address] [place] [address] at [address] [place] [address] 8:00 am - 4:30. If you need help with your wound outside these hours and cannot wait until we are again available, contact your PCP or go to the hospital emergency room.      PLEASE NOTE: IF YOU ARE UNABLE TO OBTAIN WOUND SUPPLIES, CONTINUE TO USE THE SUPPLIES YOU HAVE AVAILABLE UNTIL YOU ARE ABLE TO REACH US. IT IS MOST IMPORTANT TO KEEP THE WOUND COVERED AT ALL TIMES.       Physician Signature:_______________________    Date: ___________ Time:  ____________

## 2020-09-23 NOTE — WOUND CARE
09/23/20 1354   Anesthetic   Anesthetic 4% Lidocaine Cream   Peripheral Vascular   Peripheral Vascular (WDL) WDL   LLE Peripheral Vascular    Capillary Refill Less than/equal to 3 seconds   Color Pink   Temperature Warm   Sensation Present   Pedal Pulse Palpable   Circumference of Calf (cm) 32 cm   Circumference of Ankle (cm) 21.9 cm   Musculoskeletal   Musculoskeletal (WDL) WDL   Foot Assessment   Foot Assessment WDL   Toe Nail Assessment   Toe Nail Assessment WDL   Wound Leg lower Left; Anterior #1   Date First Assessed/Time First Assessed: 09/09/20 1420   Present on Hospital Admission: Yes  Wound Approximate Age at First Assessment (Weeks): 6 weeks  Primary Wound Type: Venous Ulcer  Location: Leg lower  Wound Location Orientation: Left; Anterior  . ..    Dressing Status Removed   Dressing Type Aquacel;Gauze;Gauze wrap (cielo)   Wound Length (cm) 1.3 cm   Wound Width (cm) 0.6 cm   Wound Depth (cm) 0.1 cm   Wound Surface Area (cm^2) 0.78 cm^2   Wound Volume (cm^3) 0.08 cm^3   Change in Wound Size % -18.18   Condition of Base Eschar   Drainage Amount Scant   Drainage Color Serous   Wound Odor None   Mady-wound Assessment Hyperpigmented   Cleansing and Cleansing Agents  Normal saline     Visit Vitals  /75 (BP 1 Location: Left arm, BP Patient Position: At rest)   Pulse 83   Temp 97.9 °F (36.6 °C)

## 2020-09-23 NOTE — WOUND CARE
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 - Wound improved in depth  - Wound debrided as noted in the Procedure Note to healthy granular bleeding margins.  - Dressing consisting of endform l DSD applied.  - Compression achieved with double tubigrip  - F/U 1 week. Subjective:  Pt comes in follow up to complains of wound to left anterior leg. States she believes the wound is not improving. Negative for fever, chills, nausea, vomiting, chest pain, shortness of breath. HPI:  States she feel on concrete went to her PCP who gave her 2 wks of Keflex. She has been wrapping it with neosporin and DSD daily. 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 ROS:  Consitutional: no weight loss, night sweats, fatigue / malaise / lethargy. Musculoskeletal: no joint / extremity pain, misalignment, stiffness, decreased ROM, crepitus. Integument: left anterior leg wound, No pruritis, rashes, lesions.   Psychiatric: No depression, anxiety, paranoia      History:  Left ankle wound  Allergies   Allergen Reactions    Lipitor [Atorvastatin] Myalgia    Pravastatin Myalgia    Zetia [Ezetimibe] Other (comments)     itching     Family History   Problem Relation Age of Onset    Diabetes Mother     Hypertension Mother     Stroke Mother       Past Medical History:   Diagnosis Date    Arthritis     GERD (gastroesophageal reflux disease)     Hx-TIA (transient ischemic attack) 2006    Hypercholesterolemia     Osteoporosis     Other ill-defined conditions(439.89)     high cholesterol    S/P colonoscopy 2001     Past Surgical History:   Procedure Laterality Date    HX ORTHOPAEDIC  2010    left hip replacement    [facility] COLONOSCOPY FLX DX W/COLLJ SPEC WHEN PFRMD  5/25/2012         STRESS TEST CARDIAC  2007    UPPER GI ENDOSCOPY,BIOPSY  6/10/2019          Social History     Tobacco Use    Smoking status: Never Smoker    Smokeless tobacco: Never Used   Substance Use Topics    Alcohol use: No       Social History     Substance and Sexual Activity   Alcohol Use No     Social History     Substance and Sexual Activity   Drug Use No      Social History     Tobacco Use   Smoking Status Never Smoker   Smokeless Tobacco Never Used     Current Outpatient Medications   Medication Sig    vit A/vit C/vit E/zinc/copper (PRESERVISION AREDS PO) Take  by mouth.  pantoprazole (PROTONIX) 40 mg tablet Take 1 Tab by mouth daily.  pravastatin (PRAVACHOL) 20 mg tablet TAKE 1 TABLET EVERY DAY    Cholecalciferol, Vitamin D3, (VITAMIN D3) 2,000 unit cap capsule Take 2,000 Units by mouth daily.  ACETAMINOPHEN (TYLENOL PO) Take  by mouth as needed.  furosemide (LASIX) 40 mg tablet Take 1 Tab by mouth daily as needed (swelling). No current facility-administered medications for this encounter. Objective:  Visit Vitals  /75 (BP 1 Location: Left arm, BP Patient Position: At rest)   Pulse 83   Temp 97.9 °F (36.6 °C)       Vascular:  B/L LE  DP 2/4; PT 2/4  capillary fill time brisk, pitting edema is present LLE, skin temperature is cool, varicosities are present B/L. Dermatological:    Wound: 1  Location: left anterior medial leg   Measurements: per RN note  Margins: intact  Drainage: serous  Odor: mild  Wound base: 80% granular 20% fibrotic  Lymphangitic streaking? No.  Undermining? No.  Sinus tracts? No.  Exposed bone? No.  Subcutaneous crepitation on palpation? No.    Nails are thickened, elongated, discolored, painful to palpation with subungual debris.   There are extensive skin cracks at both heels. Skin is dry and scaly, exhibits hemosiderin deposition. There is no maceration of the interspaces of the feet b/l. Neurological:  DTR are present, protective sensation per 5.07 [place] Oscar monofilament is intact, patient is AAOx3, mood is normal. Epicritic sensation is intact. Orthopedic:  B/L LE are symmetric, ROM of ankle, STJ, 1st MTPJ is limited, MMT 5 out of 5 for B/L LE. No amputation. Constitutional: Pt is a well developed, elderly thin female. Lymphatics: negative tenderness to palpation of neck/axillary/inguinal nodes. Procedure Note:  Excisional debridement through level of fat  Location / Ulcer: left leg ulcer  Indication: to remove non-viable tissue from wound bed. Consent in chart. Anesthesia: lidocaine gel 4%  Instrument: brittany  Residual necrosis: none  Bleeding: minimal  Hemostasis: pressure  Pre-Procedure Pain: 0  Post-Procedure Pain: 0  Area debrided < 20 cm sq. Pre-Debridement measurements: see nursing notes  Post-Debridement measurements: see nursing notes  This is part of a series of staged procedures in an attempt at limb salvage.

## 2020-10-06 ENCOUNTER — TELEPHONE (OUTPATIENT)
Dept: WOUND CARE | Age: 85
End: 2020-10-06

## 2020-10-06 NOTE — TELEPHONE ENCOUNTER
Patient Appointment Reminder and [phone] Screening     Have you or anyone in your house hold been tested for or have had confirmed positive Covid-19 test or suspected of or have you been around anyone that has had confirmed or suspected Covid-19? No     Does Patient have fever and/or lower respiratory symptoms? (shortness of breath, difficulty breathing, cough) If yes continue with travel screening questions and cancel patient appointment, and refer to to PCP or ED. No     Referred to PCP or to the closest ED and notified ED of pending patient arrival:  No   Open travel questions and document patient responses. If No stop here and give patient reminder time for appointment and ask them please limit to having only 1 person accompany to appointment if necessary, no children allowed at visits. Remind all patients and caretakers they must wear a mask when entering into the wound clinic. Reminder Appointment time given: Yes        ns and document patient responses. Not COVID-19. Proceed with scheduling  appointment    1. Provider instructs patient to travel directly to inpatient ED. 2. Provider to notify local ED of patients pending arrival.  3. Notify local health department and infection prevention immediately. 4. Obtain exposure list to include staff, others in waiting room and transport staff and  submit to infection prevention. Communication to the patient:   Due to your recent history and reported symptoms, we ask that you go to an emergency  department for further evaluation.  You should wear a facemask when you are in the same room with other people and  when you visit a health care provider. If you cannot wear a facemask, other practical  means of protection can be used, such as a wash cloth.  Cover your mouth and nose with a tissue when you cough or sneeze, or you can  cough or sneeze into your sleeve.  Throw used tissues away, and immediately wash  your hands with soap and water for at least 20 seconds.  Wash your hands often and thoroughly with soap and water for at least 20 seconds. You can use an alcohol-based hand , if soap and water are not available.

## 2020-10-07 ENCOUNTER — HOSPITAL ENCOUNTER (OUTPATIENT)
Dept: WOUND CARE | Age: 85
Discharge: HOME | End: 2020-10-07
Payer: MEDICARE

## 2020-10-07 VITALS
TEMPERATURE: 97.3 F | RESPIRATION RATE: 18 BRPM | SYSTOLIC BLOOD PRESSURE: 144 MMHG | HEART RATE: 73 BPM | DIASTOLIC BLOOD PRESSURE: 65 MMHG

## 2020-10-07 PROCEDURE — 11042 DBRDMT SUBQ TIS 1ST 20SQCM/<: CPT

## 2020-10-07 NOTE — WOUND CARE
10/07/20 1349 Anesthetic Anesthetic 4% Lidocaine Cream  
Peripheral Vascular Peripheral Vascular (WDL) X  
LLE Peripheral Vascular Capillary Refill Greater than 3 seconds Color Appropriate for race Temperature Warm Sensation Present Pedal Pulse Palpable Circumference of Calf (cm) 30 cm Circumference of Ankle (cm) 20 cm Musculoskeletal  
Musculoskeletal (WDL) WDL Foot Assessment Foot Assessment WDL Wound Leg lower Left; Anterior #1 Date First Assessed/Time First Assessed: 09/09/20 1420   Present on Hospital Admission: Yes  Wound Approximate Age at First Assessment (Weeks): 6 weeks  Primary Wound Type: Venous Ulcer  Location: Leg lower  Wound Location Orientation: Left; Anterior  . .. Dressing Status Removed Dressing Type Gauze wrap (cielo);Dry dressing Wound Length (cm) 0.8 cm Wound Width (cm) 0.3 cm Wound Depth (cm) 0.1 cm Wound Surface Area (cm^2) 0.24 cm^2 Wound Volume (cm^3) 0.02 cm^3 Change in Wound Size % 63.64 Condition of Base Epithelializing Condition of Edges Closed Drainage Amount Scant Drainage Color Serous Wound Odor None Mady-wound Assessment Hyperpigmented Cleansing and Cleansing Agents  Normal saline Pain 1 Pain Scale 1 Numeric (0 - 10) Pain Intensity 1 6 Pain Location 1 Leg Pain Orientation 1 Left Pain Description 1 Sore Visit Vitals BP (!) 144/65 (BP 1 Location: Left arm, BP Patient Position: At rest) Pulse 73 Temp 97.3 °F (36.3 °C) Resp 18

## 2020-10-07 NOTE — DISCHARGE INSTRUCTIONS
Discharge Instructions for  [address], [address] [place] [address] Avenue  Telephone: [phone]    NAME:  Mayela Milton:  1/7/1932  MEDICAL RECORD NUMBER:  [id number]  DATE:  10/07/2020    Wound Cleansing:   Do not scrub or use excessive force. Cleanse wound prior to applying a clean dressing with:    [x] Cleanse wound with Mild Soap & Water      Topical Treatments:  Do not apply lotions, creams, or ointments to wound bed unless directed. [x] Apply moisturizing lotion to skin surrounding the wound prior to dressing change. Dressings:           Wound Location left lower leg       Clean wound with mild soap and water, dry, moisturize leg around wound with lotion or vaseline, apply eda ag to wound bed, cover with 4x4 gauze and roll gauze, secure with tape. Apply 2 layers of size E tubigrip to leg over dressing. Change every other day . Return to clinic in 2 weeks for followup. [x] Elevate leg(s) above the level of the heart when sitting. [x] Avoid prolonged standing in one place. Dietary:  [x] Diet as tolerated:   [x] Increase Protein:    Activity:  [x] Activity as tolerated:               Return Appointment:  [] Wound and dressing supply provider:   [] ECF or Home Healthcare:  [] Wound Assessment: [] Physician or NP scheduled for Wound Assessment:   [x] Return Appointment: With Dr. Jonetta Schaumann  in  [facility])  [] Ordered tests:      Electronically signed on 10/07/2020 at 2:38 PM     [address] Information: Should you experience any significant changes in your wound(s) or have questions about your wound care, please contact the [facility] at [address] [place] [address] 8:00 am - 4:30. If you need help with your wound outside these hours and cannot wait until we are again available, contact your PCP or go to the hospital emergency room.      PLEASE NOTE: IF YOU ARE UNABLE TO OBTAIN WOUND SUPPLIES, CONTINUE TO USE THE SUPPLIES YOU HAVE AVAILABLE UNTIL YOU ARE ABLE TO REACH US. IT IS MOST IMPORTANT TO KEEP THE WOUND COVERED AT ALL TIMES.       Physician Signature:_______________________    Date: ___________ Time:  ____________

## 2020-10-07 NOTE — WOUND CARE
Lisa Pedraza DPM - Jarad Patino DPM         
                    
                            Podiatric Surgery - [address] [facility] [address] [facility] [address] - Follow up Assessment/Plan: 
 
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 - Wound improved in depth - Wound debrided as noted in the Procedure Note to healthy granular bleeding margins. 
- Dressing consisting of eda DSD applied. Educated pt on use of eda, pt able to change dressings herself - Compression achieved with double tubigrip - F/U 2 weeks. Subjective: 
Pt comes in follow up to complains of wound to left anterior leg. States she believes the wound is improving. Negative for fever, chills, nausea, vomiting, chest pain, shortness of breath. HPI: 
States she feel on concrete went to her PCP who gave her 2 wks of Keflex. She has been wrapping it with neosporin and DSD daily. 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 ROS: 
Consitutional: no weight loss, night sweats, fatigue / malaise / lethargy. Musculoskeletal: no joint / extremity pain, misalignment, stiffness, decreased ROM, crepitus. Integument: left anterior leg wound, No pruritis, rashes, lesions. Psychiatric: No depression, anxiety, paranoia History: 
Left ankle wound Allergies Allergen Reactions  Lipitor [Atorvastatin] Myalgia  Pravastatin Myalgia  Zetia [Ezetimibe] Other (comments)  
  itching Family History Problem Relation Age of Onset  Diabetes Mother  Hypertension Mother  Stroke Mother Past Medical History:  
Diagnosis Date  Arthritis  GERD (gastroesophageal reflux disease)  Hx-TIA (transient ischemic attack) 2006  Hypercholesterolemia  Osteoporosis  Other ill-defined conditions(799.89)   
 high cholesterol  S/P colonoscopy 2001 Past Surgical History:  
Procedure Laterality Date  HX ORTHOPAEDIC  2010  
 left hip replacement  OR COLONOSCOPY FLX DX W/COLLJ SPEC WHEN PFRMD  5/25/2012  STRESS TEST CARDIAC  2007  UPPER GI ENDOSCOPY,BIOPSY  6/10/2019 Social History Tobacco Use  Smoking status: Never Smoker  Smokeless tobacco: Never Used Substance Use Topics  Alcohol use: No  
   
Social History Substance and Sexual Activity Alcohol Use No  
 
Social History Substance and Sexual Activity Drug Use No  
  
Social History Tobacco Use Smoking Status Never Smoker Smokeless Tobacco Never Used Current Outpatient Medications Medication Sig  furosemide (LASIX) 40 mg tablet Take 1 Tab by mouth daily as needed (swelling).  vit A/vit C/vit E/zinc/copper (PRESERVISION AREDS PO) Take  by mouth.  pantoprazole (PROTONIX) 40 mg tablet Take 1 Tab by mouth daily.  pravastatin (PRAVACHOL) 20 mg tablet TAKE 1 TABLET EVERY DAY  Cholecalciferol, Vitamin D3, (VITAMIN D3) 2,000 unit cap capsule Take 2,000 Units by mouth daily.  ACETAMINOPHEN (TYLENOL PO) Take  by mouth as needed. No current facility-administered medications for this encounter. Objective: 
Visit Vitals BP (!) 144/65 (BP 1 Location: Left arm, BP Patient Position: At rest) Pulse 73 Temp 97.3 °F (36.3 °C) Resp 18 Vascular: B/L LE 
DP 2/4; PT 2/4 
capillary fill time brisk, pitting edema is present LLE, skin temperature is cool, varicosities are present B/L. Dermatological: 
 
Wound: 1 Location: left anterior medial leg Measurements: per RN note Margins: intact Drainage: serous Odor: mild Wound base: 100% granular Lymphangitic streaking? No. 
Undermining? No. 
Sinus tracts? No. 
Exposed bone? No. 
Subcutaneous crepitation on palpation?  No. 
 
 Skin is dry and scaly, exhibits hemosiderin deposition. There is no maceration of the interspaces of the feet b/l. Neurological: DTR are present, protective sensation per 5.07 [place] Oscar monofilament is intact, patient is AAOx3, mood is normal. Epicritic sensation is intact. Orthopedic: B/L LE are symmetric, ROM of ankle, STJ, 1st MTPJ is limited, MMT 5 out of 5 for B/L LE. No amputation. Constitutional: Pt is a well developed, elderly thin female. Lymphatics: negative tenderness to palpation of neck/axillary/inguinal nodes. Procedure Note: 
Excisional debridement through level of fat Location / Ulcer: left leg ulcer Indication: to remove non-viable tissue from wound bed. Consent in chart. Anesthesia: lidocaine gel 4% Instrument: brittany Residual necrosis: none Bleeding: minimal 
Hemostasis: pressure Pre-Procedure Pain: 0 Post-Procedure Pain: 0 Area debrided < 20 cm sq. Pre-Debridement measurements: see nursing notes Post-Debridement measurements: see nursing notes This is part of a series of staged procedures in an attempt at limb salvage.

## 2020-10-20 ENCOUNTER — TELEPHONE (OUTPATIENT)
Dept: WOUND CARE | Age: 85
End: 2020-10-20

## 2020-10-20 NOTE — TELEPHONE ENCOUNTER
Attempted to call patient for COVID prescreen prior to appointment on 10/21/2020. No answer. Voicemail left with reminder of COVID prescreen questions, facemask requirement, appointment time, and call back number.

## 2020-10-21 ENCOUNTER — HOSPITAL ENCOUNTER (OUTPATIENT)
Dept: WOUND CARE | Age: 85
Discharge: HOME | End: 2020-10-21
Payer: MEDICARE

## 2020-10-21 VITALS
HEART RATE: 69 BPM | RESPIRATION RATE: 18 BRPM | SYSTOLIC BLOOD PRESSURE: 146 MMHG | DIASTOLIC BLOOD PRESSURE: 74 MMHG | TEMPERATURE: 97.9 F

## 2020-10-21 PROCEDURE — 99211 OFF/OP EST MAY X REQ PHY/QHP: CPT

## 2020-10-21 NOTE — WOUND CARE
10/21/20 1415 Anesthetic Anesthetic 4% Lidocaine Cream  
LLE Peripheral Vascular Capillary Refill Greater than 3 seconds Color Appropriate for race Temperature Cool Sensation Present Pedal Pulse Palpable Circumference of Calf (cm) 32 cm Circumference of Ankle (cm) 20 cm Wound Leg lower Left; Anterior #1 Date First Assessed/Time First Assessed: 09/09/20 1420   Present on Hospital Admission: Yes  Wound Approximate Age at First Assessment (Weeks): 6 weeks  Primary Wound Type: Venous Ulcer  Location: Leg lower  Wound Location Orientation: Left; Anterior  . .. Dressing Status Removed Dressing Type Dry dressing;Collagens/cell matrix Wound Length (cm) 0.4 cm Wound Width (cm) 0.2 cm Wound Depth (cm) 0.1 cm Wound Surface Area (cm^2) 0.08 cm^2 Wound Volume (cm^3) 0.01 cm^3 Change in Wound Size % 87.88 Condition of Base Eschar  
Condition of Edges Closed Drainage Amount None Wound Odor None Mady-wound Assessment Pink Cleansing and Cleansing Agents  Normal saline Pain 1 Pain Scale 1 Numeric (0 - 10) Pain Intensity 1 3 Pain Location 1 Leg Pain Orientation 1 Left Pain Description 1 Sore Visit Vitals BP (!) 146/74 (BP 1 Location: Left arm, BP Patient Position: At rest) Pulse 69 Temp 97.9 °F (36.6 °C) Resp 18

## 2020-10-21 NOTE — WOUND CARE
Matilda Villaseñor, ISHAN - Chito Reyes [facility], DPM         
                    
                            Podiatric Surgery - [address] - Follow up Assessment/Plan: 
 
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 - Wound is epithelialized - Pt to moisturize and wear tubigrips or compression stockings - Compression achieved with double tubigrip - F/U 2 weeks to check area Subjective: 
Pt comes in follow up to complains of wound to left anterior leg. States she believes the wound is improving. Negative for fever, chills, nausea, vomiting, chest pain, shortness of breath. HPI: 
States she feel on concrete went to her PCP who gave her 2 wks of Keflex. She has been wrapping it with neosporin and DSD daily. 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 ROS: 
Consitutional: no weight loss, night sweats, fatigue / malaise / lethargy. Musculoskeletal: no joint / extremity pain, misalignment, stiffness, decreased ROM, crepitus. Integument: left anterior leg wound, No pruritis, rashes, lesions. Psychiatric: No depression, anxiety, paranoia History: 
Left ankle wound Allergies Allergen Reactions  Lipitor [Atorvastatin] Myalgia  Pravastatin Myalgia  Zetia [Ezetimibe] Other (comments)  
  itching Family History Problem Relation Age of Onset  Diabetes Mother  Hypertension Mother  Stroke Mother Past Medical History:  
Diagnosis Date  Arthritis  GERD (gastroesophageal reflux disease)  Hx-TIA (transient ischemic attack) 2006  Hypercholesterolemia  Osteoporosis  Other ill-defined conditions(739.89)   
 high cholesterol  S/P colonoscopy 2001 Past Surgical History:  
Procedure Laterality Date  HX ORTHOPAEDIC  2010  
 left hip replacement  SC COLONOSCOPY FLX DX W/COLLJ SPEC WHEN PFRMD  5/25/2012  STRESS TEST CARDIAC  2007  UPPER GI ENDOSCOPY,BIOPSY  6/10/2019 Social History Tobacco Use  Smoking status: Never Smoker  Smokeless tobacco: Never Used Substance Use Topics  Alcohol use: No  
   
Social History Substance and Sexual Activity Alcohol Use No  
 
Social History Substance and Sexual Activity Drug Use No  
  
Social History Tobacco Use Smoking Status Never Smoker Smokeless Tobacco Never Used Current Outpatient Medications Medication Sig  furosemide (LASIX) 40 mg tablet Take 1 Tab by mouth daily as needed (swelling).  vit A/vit C/vit E/zinc/copper (PRESERVISION AREDS PO) Take  by mouth.  pantoprazole (PROTONIX) 40 mg tablet Take 1 Tab by mouth daily.  pravastatin (PRAVACHOL) 20 mg tablet TAKE 1 TABLET EVERY DAY  Cholecalciferol, Vitamin D3, (VITAMIN D3) 2,000 unit cap capsule Take 2,000 Units by mouth daily.  ACETAMINOPHEN (TYLENOL PO) Take  by mouth as needed. No current facility-administered medications for this encounter. Objective: There were no vitals taken for this visit. Vascular: B/L LE 
DP 2/4; PT 2/4 
capillary fill time brisk, pitting edema is present LLE, skin temperature is cool, varicosities are present B/L. Dermatological: 
 
Wound: 1 Location: left anterior medial leg Measurements: per RN note Margins: intact Drainage: serous Odor: mild Wound base: 100% granular Lymphangitic streaking? No. 
Undermining? No. 
Sinus tracts? No. 
Exposed bone? No. 
Subcutaneous crepitation on palpation? No. 
 
Skin is dry and scaly, exhibits hemosiderin deposition. There is no maceration of the interspaces of the feet b/l. Neurological: DTR are present, protective sensation per 5.07 Charlynn Bumpers monofilament is intact, patient is AAOx3, mood is normal. Epicritic sensation is intact. Orthopedic: B/L LE are symmetric, ROM of ankle, STJ, 1st MTPJ is limited, MMT 5 out of 5 for B/L LE. No amputation. Constitutional: Pt is a well developed, elderly thin female. Lymphatics: negative tenderness to palpation of neck/axillary/inguinal nodes.

## 2020-10-21 NOTE — DISCHARGE INSTRUCTIONS
Discharge Instructions for  [address], [address] [place] [address] Avenue  Telephone: [phone]    NAME:  Joli Nyhan  YOB: 1932  MEDICAL RECORD NUMBER:  [id number]  DATE:  10/21/2020    Moisturize legs daily, wear compression stockings daily. Elevate legs when seated. Return to clinic in 2 weeks for final check of wound. Activity:  [x] Activity as tolerated:               Return Appointment:  [] Wound and dressing supply provider:   [] ECF or Home Healthcare:  [] Wound Assessment: [] Physician or NP scheduled for Wound Assessment:   [x] Return Appointment: With Dr. Home Ruiz  in  [facility])  [] Ordered tests:      Electronically signed on 10/21/2020 at 2:32 PM     [address] [facility] Information: Should you experience any significant changes in your wound(s) or have questions about your wound care, please contact the [facility] [place] Main at [address] [place] [address] 8:00 am - 4:30. If you need help with your wound outside these hours and cannot wait until we are again available, contact your PCP or go to the hospital emergency room. PLEASE NOTE: IF YOU ARE UNABLE TO OBTAIN WOUND SUPPLIES, CONTINUE TO USE THE SUPPLIES YOU HAVE AVAILABLE UNTIL YOU ARE ABLE TO REACH US. IT IS MOST IMPORTANT TO KEEP THE WOUND COVERED AT ALL TIMES.       Physician Signature:_______________________    Date: ___________ Time:  ____________

## 2020-11-03 ENCOUNTER — TELEPHONE (OUTPATIENT)
Dept: WOUND CARE | Age: 85
End: 2020-11-03

## 2020-11-03 NOTE — TELEPHONE ENCOUNTER
Patient Appointment Reminder and [phone] Screening     Have you or anyone in your house hold been tested for or have had confirmed positive Covid-19 test or suspected of or have you been around anyone that has had confirmed or suspected Covid-19? No     Does Patient have fever and/or lower respiratory symptoms? (shortness of breath, difficulty breathing, cough) If yes continue with travel screening questions and cancel patient appointment, and refer to to PCP or ED. No     Referred to PCP or to the closest ED and notified ED of pending patient arrival:  No   Open travel questions and document patient responses. If No stop here and give patient reminder time for appointment and ask them please limit to having only 1 person accompany to appointment if necessary, no children allowed at visits. Remind all patients and caretakers they must wear a mask when entering into the wound clinic. Reminder Appointment time given:  Yes

## 2020-11-04 ENCOUNTER — HOSPITAL ENCOUNTER (OUTPATIENT)
Dept: WOUND CARE | Age: 85
Discharge: HOME | End: 2020-11-04
Payer: MEDICARE

## 2020-11-04 VITALS
TEMPERATURE: 98.1 F | DIASTOLIC BLOOD PRESSURE: 78 MMHG | OXYGEN SATURATION: 97 % | HEART RATE: 80 BPM | RESPIRATION RATE: 18 BRPM | SYSTOLIC BLOOD PRESSURE: 131 MMHG

## 2020-11-04 PROCEDURE — 99211 OFF/OP EST MAY X REQ PHY/QHP: CPT

## 2020-11-04 NOTE — WOUND CARE
Daysi Sherman DPM - Lucy Murray DPM         
                    
                            Podiatric Surgery - [address] - Follow up Assessment/Plan: 
 
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 - Wound is epithelialized - Pt to moisturize and wear tubigrips or compression stockings - Compression achieved with double tubigrip - F/U prn Subjective: 
Pt comes in follow up to complains of wound to left anterior leg. States she believes the wound is healed. Negative for fever, chills, nausea, vomiting, chest pain, shortness of breath. HPI: 
States she feel on concrete went to her PCP who gave her 2 wks of Keflex. She has been wrapping it with neosporin and DSD daily. 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 ROS: 
Consitutional: no weight loss, night sweats, fatigue / malaise / lethargy. Musculoskeletal: no joint / extremity pain, misalignment, stiffness, decreased ROM, crepitus. Integument: left anterior leg wound, No pruritis, rashes, lesions. Psychiatric: No depression, anxiety, paranoia History: 
Left ankle wound Allergies Allergen Reactions  Lipitor [Atorvastatin] Myalgia  Pravastatin Myalgia  Zetia [Ezetimibe] Other (comments)  
  itching Family History Problem Relation Age of Onset  Diabetes Mother  Hypertension Mother  Stroke Mother Past Medical History:  
Diagnosis Date  Arthritis  GERD (gastroesophageal reflux disease)  Hx-TIA (transient ischemic attack) 2006  Hypercholesterolemia  Osteoporosis  Other ill-defined conditions(799.89)   
 high cholesterol  S/P colonoscopy 2001 Past Surgical History: Procedure Laterality Date  HX ORTHOPAEDIC  2010  
 left hip replacement  [place] COLONOSCOPY FLX DX W/COLLJ SPEC WHEN PFRMD  5/25/2012  STRESS TEST CARDIAC  2007  UPPER GI ENDOSCOPY,BIOPSY  6/10/2019 Social History Tobacco Use  Smoking status: Never Smoker  Smokeless tobacco: Never Used Substance Use Topics  Alcohol use: No  
   
Social History Substance and Sexual Activity Alcohol Use No  
 
Social History Substance and Sexual Activity Drug Use No  
  
Social History Tobacco Use Smoking Status Never Smoker Smokeless Tobacco Never Used Current Outpatient Medications Medication Sig  furosemide (LASIX) 40 mg tablet Take 1 Tab by mouth daily as needed (swelling).  vit A/vit C/vit E/zinc/copper (PRESERVISION AREDS PO) Take  by mouth.  pantoprazole (PROTONIX) 40 mg tablet Take 1 Tab by mouth daily.  pravastatin (PRAVACHOL) 20 mg tablet TAKE 1 TABLET EVERY DAY  Cholecalciferol, Vitamin D3, (VITAMIN D3) 2,000 unit cap capsule Take 2,000 Units by mouth daily.  ACETAMINOPHEN (TYLENOL PO) Take  by mouth as needed. No current facility-administered medications for this encounter. Objective: 
Visit Vitals /78 (BP 1 Location: Left arm, BP Patient Position: Sitting) Pulse 80 Temp 98.1 °F (36.7 °C) Resp 18 SpO2 97% Vascular: B/L LE 
DP 2/4; PT 2/4 
capillary fill time brisk, pitting edema is present LLE, skin temperature is cool, varicosities are present B/L. Dermatological: 
 
Wound: 1 Location: left anterior medial leg Measurements: per RN note Margins: intact Drainage: none Odor: mild Wound base: epithelialized Lymphangitic streaking? No. 
Undermining? No. 
Sinus tracts? No. 
Exposed bone? No. 
Subcutaneous crepitation on palpation? No. 
 
Skin is dry and scaly, exhibits hemosiderin deposition. There is no maceration of the interspaces of the feet b/l. Neurological: DTR are present, protective sensation per 5.07 Ray Oscar monofilament is intact, patient is AAOx3, mood is normal. Epicritic sensation is intact. Orthopedic: B/L LE are symmetric, ROM of ankle, STJ, 1st MTPJ is limited, MMT 5 out of 5 for B/L LE. No amputation. Constitutional: Pt is a well developed, elderly thin female. Lymphatics: negative tenderness to palpation of neck/axillary/inguinal nodes.

## 2020-11-04 NOTE — DISCHARGE INSTRUCTIONS
Discharge Instructions for  [address], [facility] [address]  Telephone: [phone]    NAME:  Sesar Cotto  YOB: 1932  MEDICAL RECORD NUMBER:  [id number]  DATE:  11/4/2020    Moisturize legs daily, wear your compression stockings daily, may take off at night when in bed. Please elevate your legs throughout the day. May purchase a lighter compression stocking 10-15mmhg to wear daily. Return Appointment:  [] Wound and dressing supply provider:   [] ECF or Home Healthcare:  [] Wound Assessment: [] Physician or NP scheduled for Wound Assessment:   [x] Return Appointment: as needed  [] Ordered tests:      Electronically signed on 11/4/2020 at 2:27 PM     [address] [facility] Information: Should you experience any significant changes in your wound(s) or have questions about your wound care, please contact the [facility] Main at [address] [place] [address] 8:00 am - 4:30. If you need help with your wound outside these hours and cannot wait until we are again available, contact your PCP or go to the hospital emergency room. PLEASE NOTE: IF YOU ARE UNABLE TO OBTAIN WOUND SUPPLIES, CONTINUE TO USE THE SUPPLIES YOU HAVE AVAILABLE UNTIL YOU ARE ABLE TO REACH US. IT IS MOST IMPORTANT TO KEEP THE WOUND COVERED AT ALL TIMES.       Physician Signature:_______________________    Date: ___________ Time:  ____________

## 2020-11-04 NOTE — WOUND CARE
11/04/20 1413 LLE Peripheral Vascular Capillary Refill Greater than 3 seconds Color Appropriate for race Temperature Warm Sensation Present Pedal Pulse Palpable Circumference of Calf (cm) 29 cm Circumference of Ankle (cm) 20 cm Wound Leg lower Left; Anterior #1 Date First Assessed/Time First Assessed: 09/09/20 1420   Present on Hospital Admission: Yes  Wound Approximate Age at First Assessment (Weeks): 6 weeks  Primary Wound Type: Venous Ulcer  Location: Leg lower  Wound Location Orientation: Left; Anterior  . .. Wound Image Dressing Status Other (Comment) (removed) Cleansed Cleansed with saline Wound Length (cm) 0.1 cm Wound Width (cm) 0.1 cm Wound Depth (cm) 0.1 cm Wound Surface Area (cm^2) 0.01 cm^2 Change in Wound Size % 98.48 Wound Volume (cm^3) 0 cm^3 Wound Healing % 100 Wound Assessment Epithelialization Drainage Amount None Wound Odor None Mady-Wound/Incision Assessment Intact; Hyperpigmented Pain 1 Pain Scale 1 Numeric (0 - 10) Pain Intensity 1 0 Visit Vitals /78 (BP 1 Location: Left arm, BP Patient Position: Sitting) Pulse 80 Temp 98.1 °F (36.7 °C) Resp 18 SpO2 97%

## 2020-11-04 NOTE — WOUND CARE
Left leg wound healed. Discharge Condition: Stable Pain: 0 Ambulatory Status: Walking Discharge Destination: Home Transportation: Car Accompanied by: Family/Caregiver Discharge instructions reviewed with Patient and copy or written instructions have been provided. All questions/concerns have been addressed at this time.

## 2021-01-13 LAB
25(OH)D3+25(OH)D2 SERPL-MCNC: 16.9 NG/ML (ref 30–100)
ALBUMIN SERPL-MCNC: 4.5 G/DL (ref 3.6–4.6)
ALBUMIN/GLOB SERPL: 2 {RATIO} (ref 1.2–2.2)
ALP SERPL-CCNC: 51 IU/L (ref 39–117)
ALT SERPL-CCNC: 11 IU/L (ref 0–32)
AST SERPL-CCNC: 19 IU/L (ref 0–40)
BILIRUB SERPL-MCNC: 0.5 MG/DL (ref 0–1.2)
BUN SERPL-MCNC: 15 MG/DL (ref 8–27)
BUN/CREAT SERPL: 17 (ref 12–28)
CALCIUM SERPL-MCNC: 9.1 MG/DL (ref 8.7–10.3)
CHLORIDE SERPL-SCNC: 103 MMOL/L (ref 96–106)
CHOLEST SERPL-MCNC: 199 MG/DL (ref 100–199)
CO2 SERPL-SCNC: 27 MMOL/L (ref 20–29)
CREAT SERPL-MCNC: 0.88 MG/DL (ref 0.57–1)
GLOBULIN SER CALC-MCNC: 2.3 G/DL (ref 1.5–4.5)
GLUCOSE SERPL-MCNC: 97 MG/DL (ref 65–99)
HDLC SERPL-MCNC: 88 MG/DL
LDLC SERPL CALC-MCNC: 100 MG/DL (ref 0–99)
POTASSIUM SERPL-SCNC: 4.9 MMOL/L (ref 3.5–5.2)
PROT SERPL-MCNC: 6.8 G/DL (ref 6–8.5)
SODIUM SERPL-SCNC: 141 MMOL/L (ref 134–144)
TRIGL SERPL-MCNC: 60 MG/DL (ref 0–149)
VLDLC SERPL CALC-MCNC: 11 MG/DL (ref 5–40)

## 2021-01-13 RX ORDER — ERGOCALCIFEROL 1.25 MG/1
50000 CAPSULE ORAL
Qty: 5 CAP | Refills: 1 | Status: SHIPPED | OUTPATIENT
Start: 2021-01-13 | End: 2021-03-04

## 2021-01-18 ENCOUNTER — OFFICE VISIT (OUTPATIENT)
Dept: INTERNAL MEDICINE CLINIC | Age: 86
End: 2021-01-18
Payer: MEDICARE

## 2021-01-18 VITALS
RESPIRATION RATE: 16 BRPM | SYSTOLIC BLOOD PRESSURE: 130 MMHG | TEMPERATURE: 97.8 F | DIASTOLIC BLOOD PRESSURE: 74 MMHG | OXYGEN SATURATION: 98 % | WEIGHT: 104.4 LBS | HEIGHT: 62 IN | HEART RATE: 72 BPM | BODY MASS INDEX: 19.21 KG/M2

## 2021-01-18 DIAGNOSIS — Z00.00 MEDICARE ANNUAL WELLNESS VISIT, SUBSEQUENT: Primary | ICD-10-CM

## 2021-01-18 DIAGNOSIS — R60.0 LOCALIZED EDEMA: ICD-10-CM

## 2021-01-18 DIAGNOSIS — E78.00 HYPERCHOLESTEROLEMIA: ICD-10-CM

## 2021-01-18 DIAGNOSIS — E55.9 VITAMIN D DEFICIENCY: ICD-10-CM

## 2021-01-18 DIAGNOSIS — Z91.81 AT HIGH RISK FOR FALLS: ICD-10-CM

## 2021-01-18 PROCEDURE — 1100F PTFALLS ASSESS-DOCD GE2>/YR: CPT | Performed by: INTERNAL MEDICINE

## 2021-01-18 PROCEDURE — 99214 OFFICE O/P EST MOD 30 MIN: CPT | Performed by: INTERNAL MEDICINE

## 2021-01-18 PROCEDURE — 1090F PRES/ABSN URINE INCON ASSESS: CPT | Performed by: INTERNAL MEDICINE

## 2021-01-18 PROCEDURE — G8536 NO DOC ELDER MAL SCRN: HCPCS | Performed by: INTERNAL MEDICINE

## 2021-01-18 PROCEDURE — G0439 PPPS, SUBSEQ VISIT: HCPCS | Performed by: INTERNAL MEDICINE

## 2021-01-18 PROCEDURE — G8427 DOCREV CUR MEDS BY ELIG CLIN: HCPCS | Performed by: INTERNAL MEDICINE

## 2021-01-18 PROCEDURE — G8420 CALC BMI NORM PARAMETERS: HCPCS | Performed by: INTERNAL MEDICINE

## 2021-01-18 PROCEDURE — G8510 SCR DEP NEG, NO PLAN REQD: HCPCS | Performed by: INTERNAL MEDICINE

## 2021-01-18 PROCEDURE — 3288F FALL RISK ASSESSMENT DOCD: CPT | Performed by: INTERNAL MEDICINE

## 2021-01-18 NOTE — PROGRESS NOTES
Se Humphreys  Identified pt with two pt identifiers(name and ). Chief Complaint   Patient presents with    Annual Wellness Visit    Cholesterol Problem       Reviewed record In preparation for visit and have obtained necessary documentation. 1. Have you been to the ER, urgent care clinic or hospitalized since your last visit? No     2. Have you seen or consulted any other health care providers outside of the [address] [place] [address] since your last visit? Include any pap smears or colon screening. No    Patient has an advance directive. Vitals reviewed with provider.     Health Maintenance reviewed:     Health Maintenance Due   Topic    GLAUCOMA SCREENING Q2Y           Wt Readings from Last 3 Encounters:   21 104 lb 6.4 oz (47.4 kg)   20 99 lb 6.4 oz (45.1 kg)   20 102 lb (46.3 kg)        Temp Readings from Last 3 Encounters:   21 97.8 °F (36.6 °C) (Oral)   20 98.1 °F (36.7 °C)   10/21/20 97.9 °F (36.6 °C)        BP Readings from Last 3 Encounters:   21 130/74   20 131/78   10/21/20 (!) 146/74        Pulse Readings from Last 3 Encounters:   21 72   20 80   10/21/20 69        Vitals:    21 0934   BP: 130/74   Pulse: 72   Resp: 16   Temp: 97.8 °F (36.6 °C)   TempSrc: Oral   SpO2: 98%   Weight: 104 lb 6.4 oz (47.4 kg)   Height: 5' 2\" (1.575 m)   PainSc:   0 - No pain          Learning Assessment:   :       Learning Assessment 2014   PRIMARY LEARNER Patient   HIGHEST LEVEL OF EDUCATION - PRIMARY LEARNER  DID NOT GRADUATE HIGH SCHOOL   BARRIERS PRIMARY LEARNER NONE   CO-LEARNER CAREGIVER No   PRIMARY LANGUAGE ENGLISH   LEARNER PREFERENCE PRIMARY DEMONSTRATION   ANSWERED BY patient   RELATIONSHIP SELF        Depression Screening:   :       3 most recent PHQ Screens 2021   Little interest or pleasure in doing things Not at all   Feeling down, depressed, irritable, or hopeless Several days   Total Score PHQ 2 1        Fall Risk Assessment:   :       Fall Risk Assessment, last 12 mths 1/18/2021   Able to walk? Yes   Fall in past 12 months? 1   Number of falls in past 12 months 1   Fall with injury? 1        Abuse Screening:   :       Abuse Screening Questionnaire 1/23/2020 5/8/2019 11/7/2017 11/10/2016 6/6/2014   Do you ever feel afraid of your partner? N N N N N   Are you in a relationship with someone who physically or mentally threatens you? N N N N N   Is it safe for you to go home?  Y Y Y Y Y        ADL Screening:   :       ADL Assessment 11/7/2017   Feeding yourself No Help Needed   Getting from bed to chair No Help Needed   Getting dressed No Help Needed   Bathing or showering No Help Needed   Walk across the room (includes cane/walker) No Help Needed   Using the telphone No Help Needed   Taking your medications No Help Needed   Preparing meals No Help Needed   Managing money (expenses/bills) No Help Needed   Moderately strenuous housework (laundry) No Help Needed   Shopping for personal items (toiletries/medicines) No Help Needed   Shopping for groceries No Help Needed   Driving No Help Needed   Climbing a flight of stairs No Help Needed   Getting to places beyond walking distances No Help Needed

## 2021-01-18 NOTE — PROGRESS NOTES
HPI  Ms. Grisel Gomez is a 80y.o. year old female, she is seen today for AWV, high cholesterol. Wound has healed - went to wound care clinic. She is wearing support stockings, no edema as long she wears them. No chest pain, sob, dizziness, weakness. Just getting generally weaker with age. Eye exam - Dr. Boston Cm  No falls since last visit. Is being more careful and aware of her surroundings. No heartburn or indigestion. Appetite is good. Chief Complaint   Patient presents with    Annual Wellness Visit    Cholesterol Problem        Prior to Admission medications    Medication Sig Start Date End Date Taking? Authorizing Provider   furosemide (LASIX) 40 mg tablet Take 1 Tab by mouth daily as needed (swelling). 9/3/20  Yes Saleem Hansen MD   vit A/vit C/vit E/zinc/copper (PRESERVISION AREDS PO) Take  by mouth. Yes Provider, Historical   pantoprazole (PROTONIX) 40 mg tablet Take 1 Tab by mouth daily. 7/22/20  Yes Saleem Hansen MD   pravastatin (PRAVACHOL) 20 mg tablet TAKE 1 TABLET EVERY DAY 7/22/20  Yes Saleem Hansen MD   Cholecalciferol, Vitamin D3, (VITAMIN D3) 2,000 unit cap capsule Take 2,000 Units by mouth daily. 11/7/17  Yes Saleem Hansen MD   ACETAMINOPHEN (TYLENOL PO) Take  by mouth as needed. Yes Provider, Historical   ergocalciferol (ERGOCALCIFEROL) 1,250 mcg (50,000 unit) capsule Take 1 Cap by mouth every seven (7) days for 8 doses. 1/13/21 3/4/21  Saleem Hansen MD         Allergies   Allergen Reactions    Lipitor [Atorvastatin] Myalgia    Pravastatin Myalgia    Zetia [Ezetimibe] Other (comments)     itching         REVIEW OF SYSTEMS:  Per HPI    PHYSICAL EXAM:  Visit Vitals  /74 (BP 1 Location: Left arm, BP Patient Position: Sitting)   Pulse 72   Temp 97.8 °F (36.6 °C) (Oral)   Resp 16   Ht 5' 2\" (1.575 m)   Wt 104 lb 6.4 oz (47.4 kg)   SpO2 98%   BMI 19.10 kg/m²     Constitutional: Appears well-developed and well-nourished. No distress.    HENT:   Head: Normocephalic and atraumatic.   Eyes: No scleral icterus.   Neck: no lad, no tm, supple   Cardiovascular: Normal S1/S2, regular rhythm.  No murmurs, rubs, or gallops.  Pulmonary/Chest: Effort normal and breath sounds normal. No respiratory distress. No wheezes, rhonchi, or rales.   Abdomen: Soft, NT/ND, +BS, no rebound or guarding, no masses, no HSM appreciated.   Back: +kyphosis  Ext: No edema.   Neurological: Alert.   Psychiatric: Normal mood and affect. Behavior is normal.     Lab Results   Component Value Date/Time    Sodium 141 01/12/2021 08:38 AM    Potassium 4.9 01/12/2021 08:38 AM    Chloride 103 01/12/2021 08:38 AM    CO2 27 01/12/2021 08:38 AM    Anion gap 5 06/11/2019 03:02 AM    Glucose 97 01/12/2021 08:38 AM    BUN 15 01/12/2021 08:38 AM    Creatinine 0.88 01/12/2021 08:38 AM    BUN/Creatinine ratio 17 01/12/2021 08:38 AM    GFR est AA 67 01/12/2021 08:38 AM    GFR est non-AA 58 (L) 01/12/2021 08:38 AM    Calcium 9.1 01/12/2021 08:38 AM    Bilirubin, total 0.5 01/12/2021 08:38 AM    Alk. phosphatase 51 01/12/2021 08:38 AM    Protein, total 6.8 01/12/2021 08:38 AM    Albumin 4.5 01/12/2021 08:38 AM    Globulin 3.5 06/08/2019 05:52 PM    A-G Ratio 2.0 01/12/2021 08:38 AM    ALT (SGPT) 11 01/12/2021 08:38 AM     No results found for: HBA1C, HGBE8, XXP8XTRR, DYV4TFFZ   Lab Results   Component Value Date/Time    Cholesterol, total 199 01/12/2021 08:38 AM    HDL Cholesterol 88 01/12/2021 08:38 AM    LDL, calculated 100 (H) 01/12/2021 08:38 AM    LDL, calculated 91 07/16/2020 09:58 AM    VLDL, calculated 11 01/12/2021 08:38 AM    VLDL, calculated 15 07/16/2020 09:58 AM    Triglyceride 60 01/12/2021 08:38 AM    CHOL/HDL Ratio 2.6 10/12/2010 08:57 AM          ASSESSMENT/PLAN  Diagnoses and all orders for this visit:    1. Medicare annual wellness visit, subsequent    2. At high risk for falls    3. Hypercholesterolemia  -     METABOLIC PANEL, COMPREHENSIVE; Future  -     LIPID PANEL; Future  At goal -  continue current medications   4. Vitamin D deficiency  -     VITAMIN D, 25 HYDROXY; Future  High dose vit d called in - then 1000-2000iu daily  5. Localized edema  Improved with compression stockings and lasix prn        Health Maintenance Due   Topic Date Due    GLAUCOMA SCREENING Q2Y  10/01/2018        Follow-up and Dispositions    · Return in about 6 months (around 7/18/2021) for vit d deficiency. Reviewed plan of care. Patient has provided input and agrees with goals. The nurse provided the patient and/or family with advanced directive information if needed and encouraged the patient to provide a copy to the office when available. This is the Subsequent Medicare Annual Wellness Exam, performed 12 months or more after the Initial AWV or the last Subsequent AWV    I have reviewed the patient's medical history in detail and updated the computerized patient record. Depression Risk Factor Screening:     3 most recent PHQ Screens 1/18/2021   Little interest or pleasure in doing things Not at all   Feeling down, depressed, irritable, or hopeless Several days   Total Score PHQ 2 1       Alcohol Risk Screen    Do you average more than 1 drink per night or more than 7 drinks a week:  No    On any one occasion in the past three months have you have had more than 3 drinks containing alcohol:  No        Functional Ability and Level of Safety:    Hearing: The patient needs further evaluation. Activities of Daily Living: The home contains: no safety equipment. Patient does total self care      Ambulation: with mild difficulty     Fall Risk:  Fall Risk Assessment, last 12 mths 1/18/2021   Able to walk? Yes   Fall in past 12 months? 1   Number of falls in past 12 months 1   Fall with injury?  1      Abuse Screen:  Patient is not abused       Cognitive Screening    Has your family/caregiver stated any concerns about your memory: no     Cognitive Screening: N/A    Assessment/Plan   Education and counseling provided:  Are appropriate based on today's review and evaluation        Health Maintenance Due     Health Maintenance Due   Topic Date Due    GLAUCOMA SCREENING Q2Y  10/01/2018       Patient Care Team   Patient Care Team:  Evelia Coleman MD as PCP - General (Internal Medicine)  Evelia Coleman MD as PCP - REHABILITATION [facility] Empaneled Provider    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 0XD    Caregiver stress Z63.6    Wears hearing aid Z97.4    At high risk for falls Z91.81    Localized edema R60.0     Past Medical History:   Diagnosis Date    Arthritis     GERD (gastroesophageal reflux disease)     Hx-TIA (transient ischemic attack) 2006    Hypercholesterolemia     Osteoporosis     Other ill-defined conditions(799.89)     high cholesterol    S/P colonoscopy 2001      Past Surgical History:   Procedure Laterality Date    HX ORTHOPAEDIC  2010    left hip replacement    FL COLONOSCOPY FLX DX W/COLLJ SPEC WHEN PFRMD  5/25/2012         STRESS TEST CARDIAC  2007    UPPER GI ENDOSCOPY,BIOPSY  6/10/2019          Current Outpatient Medications   Medication Sig Dispense Refill    furosemide (LASIX) 40 mg tablet Take 1 Tab by mouth daily as needed (swelling). 30 Tab 1    vit A/vit C/vit E/zinc/copper (PRESERVISION AREDS PO) Take  by mouth.  pantoprazole (PROTONIX) 40 mg tablet Take 1 Tab by mouth daily.  90 Tab 3    pravastatin (PRAVACHOL) 20 mg tablet TAKE 1 TABLET EVERY DAY 90 Tab 3    Cholecalciferol, Vitamin D3, (VITAMIN D3) 2,000 unit cap capsule Take 2,000 Units by mouth daily. 90 Cap 4    ACETAMINOPHEN (TYLENOL PO) Take  by mouth as needed.  ergocalciferol (ERGOCALCIFEROL) 1,250 mcg (50,000 unit) capsule Take 1 Cap by mouth every seven (7) days for 8 doses. 5 Cap 1     Allergies   Allergen Reactions    Lipitor [Atorvastatin] Myalgia    Pravastatin Myalgia    Zetia [Ezetimibe] Other (comments)     itching       Family History   Problem Relation Age of Onset    Diabetes Mother     Hypertension Mother     Stroke Mother      Social History     Tobacco Use    Smoking status: Never Smoker    Smokeless tobacco: Never Used   Substance Use Topics    Alcohol use:  No

## 2021-05-04 DIAGNOSIS — E78.00 HYPERCHOLESTEROLEMIA: ICD-10-CM

## 2021-05-04 DIAGNOSIS — K21.9 GASTROESOPHAGEAL REFLUX DISEASE WITHOUT ESOPHAGITIS: ICD-10-CM

## 2021-05-04 RX ORDER — PANTOPRAZOLE SODIUM 40 MG/1
40 TABLET, DELAYED RELEASE ORAL DAILY
Qty: 90 TAB | Refills: 3 | Status: SHIPPED | OUTPATIENT
Start: 2021-05-04 | End: 2021-05-17

## 2021-05-04 RX ORDER — PRAVASTATIN SODIUM 20 MG/1
TABLET ORAL
Qty: 90 TAB | Refills: 3 | Status: SHIPPED | OUTPATIENT
Start: 2021-05-04 | End: 2021-09-08

## 2021-05-04 NOTE — TELEPHONE ENCOUNTER
PCP: Kelsea Guerrero MD    Last appt: 1/18/2021  Future Appointments   Date Time Provider [place]   7/26/2021  3:00 PM Kelsea Guerrero MD [facility] AMB       Requested Prescriptions     Pending Prescriptions Disp Refills    pantoprazole (PROTONIX) 40 mg tablet 90 Tab 3     Sig: Take 1 Tab by mouth daily.     pravastatin (PRAVACHOL) 20 mg tablet 90 Tab 3     Sig: TAKE 1 TABLET EVERY DAY

## 2021-05-04 NOTE — TELEPHONE ENCOUNTER
Milagros called and stated that the pt will need a short supply sent over to the pt [facility] DR MAITE CASILLAS

## 2021-05-17 DIAGNOSIS — K21.9 GASTROESOPHAGEAL REFLUX DISEASE WITHOUT ESOPHAGITIS: ICD-10-CM

## 2021-05-17 RX ORDER — PANTOPRAZOLE SODIUM 40 MG/1
TABLET, DELAYED RELEASE ORAL
Qty: 90 TAB | Refills: 3 | Status: SHIPPED | OUTPATIENT
Start: 2021-05-17 | End: 2021-11-30 | Stop reason: DRUGHIGH

## 2021-07-21 LAB
25(OH)D3+25(OH)D2 SERPL-MCNC: 25.1 NG/ML (ref 30–100)
ALBUMIN SERPL-MCNC: 4.7 G/DL (ref 3.6–4.6)
ALBUMIN/GLOB SERPL: 2 {RATIO} (ref 1.2–2.2)
ALP SERPL-CCNC: 52 IU/L (ref 48–121)
ALT SERPL-CCNC: 12 IU/L (ref 0–32)
AST SERPL-CCNC: 19 IU/L (ref 0–40)
BILIRUB SERPL-MCNC: 0.6 MG/DL (ref 0–1.2)
BUN SERPL-MCNC: 14 MG/DL (ref 8–27)
BUN/CREAT SERPL: 15 (ref 12–28)
CALCIUM SERPL-MCNC: 9.1 MG/DL (ref 8.7–10.3)
CHLORIDE SERPL-SCNC: 101 MMOL/L (ref 96–106)
CHOLEST SERPL-MCNC: 204 MG/DL (ref 100–199)
CO2 SERPL-SCNC: 26 MMOL/L (ref 20–29)
CREAT SERPL-MCNC: 0.95 MG/DL (ref 0.57–1)
GLOBULIN SER CALC-MCNC: 2.4 G/DL (ref 1.5–4.5)
GLUCOSE SERPL-MCNC: 102 MG/DL (ref 65–99)
HDLC SERPL-MCNC: 83 MG/DL
LDLC SERPL CALC-MCNC: 110 MG/DL (ref 0–99)
POTASSIUM SERPL-SCNC: 4.4 MMOL/L (ref 3.5–5.2)
PROT SERPL-MCNC: 7.1 G/DL (ref 6–8.5)
SODIUM SERPL-SCNC: 140 MMOL/L (ref 134–144)
TRIGL SERPL-MCNC: 63 MG/DL (ref 0–149)
VLDLC SERPL CALC-MCNC: 11 MG/DL (ref 5–40)

## 2021-07-26 ENCOUNTER — OFFICE VISIT (OUTPATIENT)
Dept: INTERNAL MEDICINE CLINIC | Age: 86
End: 2021-07-26
Payer: MEDICARE

## 2021-07-26 VITALS
WEIGHT: 98.8 LBS | HEIGHT: 62 IN | HEART RATE: 75 BPM | BODY MASS INDEX: 18.18 KG/M2 | DIASTOLIC BLOOD PRESSURE: 74 MMHG | SYSTOLIC BLOOD PRESSURE: 133 MMHG | RESPIRATION RATE: 16 BRPM | OXYGEN SATURATION: 98 % | TEMPERATURE: 98.1 F

## 2021-07-26 DIAGNOSIS — E78.00 HYPERCHOLESTEROLEMIA: Primary | ICD-10-CM

## 2021-07-26 DIAGNOSIS — R42 DIZZINESS: ICD-10-CM

## 2021-07-26 DIAGNOSIS — H57.11 EYE PAIN, RIGHT: ICD-10-CM
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
## 2021-07-26 DIAGNOSIS — E55.9 VITAMIN D DEFICIENCY: ICD-10-CM

## 2021-07-26 PROCEDURE — G8419 CALC BMI OUT NRM PARAM NOF/U: HCPCS | Performed by: INTERNAL MEDICINE

## 2021-07-26 PROCEDURE — G8432 DEP SCR NOT DOC, RNG: HCPCS | Performed by: INTERNAL MEDICINE

## 2021-07-26 PROCEDURE — G8536 NO DOC ELDER MAL SCRN: HCPCS | Performed by: INTERNAL MEDICINE

## 2021-07-26 PROCEDURE — 1100F PTFALLS ASSESS-DOCD GE2>/YR: CPT | Performed by: INTERNAL MEDICINE

## 2021-07-26 PROCEDURE — 99214 OFFICE O/P EST MOD 30 MIN: CPT | Performed by: INTERNAL MEDICINE

## 2021-07-26 PROCEDURE — 3288F FALL RISK ASSESSMENT DOCD: CPT | Performed by: INTERNAL MEDICINE

## 2021-07-26 PROCEDURE — 1090F PRES/ABSN URINE INCON ASSESS: CPT | Performed by: INTERNAL MEDICINE

## 2021-07-26 PROCEDURE — G8427 DOCREV CUR MEDS BY ELIG CLIN: HCPCS | Performed by: INTERNAL MEDICINE

## 2021-07-26 RX ORDER — ACETAMINOPHEN 500 MG
2000 TABLET ORAL DAILY
Qty: 90 CAPSULE | Refills: 4 | Status: SHIPPED | OUTPATIENT
Start: 2021-07-26 | End: 2022-09-14

## 2021-07-26 RX ORDER — DOXYCYCLINE 100 MG/1
100 CAPSULE ORAL 2 TIMES DAILY
Qty: 20 CAPSULE | Refills: 0 | Status: SHIPPED | OUTPATIENT
Start: 2021-07-26 | End: 2021-08-05

## 2021-07-26 RX ORDER — MECLIZINE HYDROCHLORIDE 25 MG/1
25 TABLET ORAL
Qty: 30 TABLET | Refills: 0 | Status: SHIPPED | OUTPATIENT
Start: 2021-07-26 | End: 2021-08-05

## 2021-07-26 NOTE — PROGRESS NOTES
HPI  Ms. Adam Maher is a 80y.o. year old female, she is seen today for follow up vit d def. Other concerns as well. Accompanied by daughter. Has had intermittent sharp pain around right eye, no vision changes. Has been going on for about 4 weeks. Also will feel dizzy if looks up or down, no issues when staying still. Symptoms for the last 4 weeks. Daughter removed tick about 4 weeks ago - had been embedded - area was red and swollen - left lower back. Has had a cold for about a week, runny nose, sneezing, coughing but seems to be getting better. No sore throat. No fever. Says woke one day feeling much better - head felt clear but then symptoms returned the next day. Has lost 6# in 6 mos. Appetite is good. Chief Complaint   Patient presents with    Vitamin D Deficiency    Dizziness    Eye Pain     Ongoing for 3-4 weeks        Prior to Admission medications    Medication Sig Start Date End Date Taking? Authorizing Provider   cholecalciferol (VITAMIN D3) (2,000 UNITS /50 MCG) cap capsule Take 1 Capsule by mouth daily. 7/26/21  Yes Sofi Alvarado MD   meclizine (ANTIVERT) 25 mg tablet Take 1 Tablet by mouth three (3) times daily as needed for Dizziness for up to 10 days. 7/26/21 8/5/21 Yes Sofi Alvarado MD   doxycycline (MONODOX) 100 mg capsule Take 1 Capsule by mouth two (2) times a day for 10 days. 7/26/21 8/5/21 Yes Sofi Alvarado MD   pantoprazole (PROTONIX) 40 mg tablet TAKE 1 TABLET EVERY DAY 5/17/21  Yes Sofi Alvarado MD   pravastatin (PRAVACHOL) 20 mg tablet TAKE 1 TABLET EVERY DAY 5/4/21  Yes Sofi Alvarado MD   vit A/vit C/vit E/zinc/copper (PRESERVISION AREDS PO) Take  by mouth. Yes Provider, Historical   ACETAMINOPHEN (TYLENOL PO) Take  by mouth as needed. Yes Provider, Historical   furosemide (LASIX) 40 mg tablet Take 1 Tab by mouth daily as needed (swelling).   Patient not taking: Reported on 7/26/2021 9/3/20   Sofi Alvarado MD Allergies   Allergen Reactions    Lipitor [Atorvastatin] Myalgia    Pravastatin Myalgia    Zetia [Ezetimibe] Other (comments)     itching         REVIEW OF SYSTEMS:  Per HPI    PHYSICAL EXAM:  Visit Vitals  /74 (BP 1 Location: Left arm, BP Patient Position: Sitting, BP Cuff Size: Adult)   Pulse 75   Temp 98.1 °F (36.7 °C) (Oral)   Resp 16   Ht 5' 2\" (1.575 m)   Wt 98 lb 12.8 oz (44.8 kg)   SpO2 98%   BMI 18.07 kg/m²     Constitutional: Appears well-developed and well-nourished. No distress. Skin: slightly erythematous papule left lower back  HENT:   Head: Normocephalic and atraumatic. Eyes: No scleral icterus. PERRL, EOMI, +venous pulsations on funduscopic exam  Cardiovascular: Normal S1/S2, regular rhythm. No murmurs, rubs, or gallops. Pulmonary/Chest: Effort normal and breath sounds normal. No respiratory distress. No wheezes, rhonchi, or rales. Ext: No edema. Neurological: Alert. CN II-XII intact other than tongue deviates to left which patient says is chronic, cerebellum intact to rapid alt hand movements, strength 5/5 b/l UE and LE  Psychiatric: Normal mood and affect. Behavior is normal.     Lab Results   Component Value Date/Time    Sodium 140 07/20/2021 10:38 AM    Potassium 4.4 07/20/2021 10:38 AM    Chloride 101 07/20/2021 10:38 AM    CO2 26 07/20/2021 10:38 AM    Anion gap 5 06/11/2019 03:02 AM    Glucose 102 (H) 07/20/2021 10:38 AM    BUN 14 07/20/2021 10:38 AM    Creatinine 0.95 07/20/2021 10:38 AM    BUN/Creatinine ratio 15 07/20/2021 10:38 AM    GFR est AA 61 07/20/2021 10:38 AM    GFR est non-AA 53 (L) 07/20/2021 10:38 AM    Calcium 9.1 07/20/2021 10:38 AM    Bilirubin, total 0.6 07/20/2021 10:38 AM    Alk.  phosphatase 52 07/20/2021 10:38 AM    Protein, total 7.1 07/20/2021 10:38 AM    Albumin 4.7 (H) 07/20/2021 10:38 AM    Globulin 3.5 06/08/2019 05:52 PM    A-G Ratio 2.0 07/20/2021 10:38 AM    ALT (SGPT) 12 07/20/2021 10:38 AM     No results found for: HBA1C, RNI4YFDK, RBM5IIDD   Lab Results   Component Value Date/Time    Cholesterol, total 204 (H) 07/20/2021 10:38 AM    HDL Cholesterol 83 07/20/2021 10:38 AM    LDL, calculated 110 (H) 07/20/2021 10:38 AM    LDL, calculated 91 07/16/2020 09:58 AM    VLDL, calculated 11 07/20/2021 10:38 AM    VLDL, calculated 15 07/16/2020 09:58 AM    Triglyceride 63 07/20/2021 10:38 AM    CHOL/HDL Ratio 2.6 10/12/2010 08:57 AM          ASSESSMENT/PLAN  Diagnoses and all orders for this visit:    1. Hypercholesterolemia    2. Vitamin D deficiency  -     cholecalciferol (VITAMIN D3) (2,000 UNITS /50 MCG) cap capsule; Take 1 Capsule by mouth daily. 3. Dizziness  -     meclizine (ANTIVERT) 25 mg tablet; Take 1 Tablet by mouth three (3) times daily as needed for Dizziness for up to 10 days. 4. Eye pain, right    5. Tick bite, initial encounter  -     doxycycline (MONODOX) 100 mg capsule; Take 1 Capsule by mouth two (2) times a day for 10 days. Vague symptoms as mentioned in HPI- ?due to tick bite - treat with doxy  rec eye exam with ophthalmologist  Cholesterol acceptable for age - continue pravastatin  Consider vertigo as in past - refilled meclizine    There are no preventive care reminders to display for this patient. Follow-up and Dispositions    · Return in about 1 month (around 8/26/2021) for HA, dizziness. Reviewed plan of care. Patient has provided input and agrees with goals. The nurse provided the patient and/or family with advanced directive information if needed and encouraged the patient to provide a copy to the office when available.

## 2021-08-25 ENCOUNTER — OFFICE VISIT (OUTPATIENT)
Dept: INTERNAL MEDICINE CLINIC | Age: 86
End: 2021-08-25
Payer: MEDICARE

## 2021-08-25 VITALS
BODY MASS INDEX: 19.06 KG/M2 | DIASTOLIC BLOOD PRESSURE: 74 MMHG | HEART RATE: 72 BPM | HEIGHT: 62 IN | SYSTOLIC BLOOD PRESSURE: 133 MMHG | TEMPERATURE: 98.5 F | OXYGEN SATURATION: 98 % | WEIGHT: 103.6 LBS | RESPIRATION RATE: 16 BRPM

## 2021-08-25 DIAGNOSIS — R42 VERTIGO: ICD-10-CM

## 2021-08-25 DIAGNOSIS — R42 DIZZINESS: Primary | ICD-10-CM

## 2021-08-25 DIAGNOSIS — R20.0 NUMBNESS AND TINGLING OF FOOT: ICD-10-CM

## 2021-08-25 DIAGNOSIS — R20.2 NUMBNESS AND TINGLING OF FOOT: ICD-10-CM

## 2021-08-25 DIAGNOSIS — M79.672 LEFT FOOT PAIN: ICD-10-CM

## 2021-08-25 PROCEDURE — 99214 OFFICE O/P EST MOD 30 MIN: CPT | Performed by: INTERNAL MEDICINE

## 2021-08-25 PROCEDURE — G8420 CALC BMI NORM PARAMETERS: HCPCS | Performed by: INTERNAL MEDICINE

## 2021-08-25 PROCEDURE — G8432 DEP SCR NOT DOC, RNG: HCPCS | Performed by: INTERNAL MEDICINE

## 2021-08-25 PROCEDURE — G8536 NO DOC ELDER MAL SCRN: HCPCS | Performed by: INTERNAL MEDICINE

## 2021-08-25 PROCEDURE — 1100F PTFALLS ASSESS-DOCD GE2>/YR: CPT | Performed by: INTERNAL MEDICINE

## 2021-08-25 PROCEDURE — 1090F PRES/ABSN URINE INCON ASSESS: CPT | Performed by: INTERNAL MEDICINE

## 2021-08-25 PROCEDURE — G8427 DOCREV CUR MEDS BY ELIG CLIN: HCPCS | Performed by: INTERNAL MEDICINE

## 2021-08-25 PROCEDURE — 3288F FALL RISK ASSESSMENT DOCD: CPT | Performed by: INTERNAL MEDICINE

## 2021-08-25 NOTE — PROGRESS NOTES
Horacio Grphilly  Identified pt with two pt identifiers(name and ). Chief Complaint   Patient presents with    Dizziness     Ongoing     Weight Management    Headache     On and off        Reviewed record In preparation for visit and have obtained necessary documentation. 1. Have you been to the ER, urgent care clinic or hospitalized since your last visit? No     2. Have you seen or consulted any other health care providers outside of the [address] [place] [address] since your last visit? Include any pap smears or colon screening. No    Patient has an advance directive. Vitals reviewed with provider. Health Maintenance reviewed: There are no preventive care reminders to display for this patient. Is it safe for you to go home? Y Y Y Y Y        No Help Needed    No Help Needed                BP Readings from Last 3 Encounters:   21 133/74   21 133/74   21 130/74                       No Help Needed Learning Assessment 2014   PRIMARY LEARNER Patient   HIGHEST LEVEL OF EDUCATION - PRIMARY LEARNER  DID NOT GRADUATE HIGH SCHOOL   BARRIERS PRIMARY LEARNER NONE   CO-LEARNER CAREGIVER No   PRIMARY LANGUAGE ENGLISH   LEARNER PREFERENCE PRIMARY DEMONSTRATION   ANSWERED BY patient   RELATIONSHIP SELF    N N   Are you in a relationship  3 most recent PHQ Screens 2021   Little interest or pleasure in doing things Not at all   Feeling down, depressed, irritable, or hopeless Several days   Total Score PHQ 2 1   all in past 12 months? 0   Do you feel unsteady? 0   Are you worried about falling 0   Number of falls in past 12 months -   Fall with injury?  -

## 2021-08-25 NOTE — PROGRESS NOTES
HPI  Ms. Issac Madsen is a 80y.o. year old female, she is seen today for follow up dizziness. Plan last visit:    Vague symptoms as mentioned in HPI- ?due to tick bite - treat with doxy  rec eye exam with ophthalmologist  Cholesterol acceptable for age - continue pravastatin  Consider vertigo as in past - refilled meclizine      Today says only a few spells of dizziness relieved with meclizine. New complaint of left foot pain, worse at night, has to get up every few hours at night to walk because it feels tight in left leg and foot - same side as left hip replacement and has been occurring since then. Cold symptoms have resolved. No more right eye pain. Appetite better - has gained 5#. Also drinking ensure. Chief Complaint   Patient presents with    Dizziness     Ongoing     Weight Management    Headache     On and off         Prior to Admission medications    Medication Sig Start Date End Date Taking? Authorizing Provider   cholecalciferol (VITAMIN D3) (2,000 UNITS /50 MCG) cap capsule Take 1 Capsule by mouth daily. 7/26/21  Yes Isak Cope MD   pantoprazole (PROTONIX) 40 mg tablet TAKE 1 TABLET EVERY DAY 5/17/21  Yes Isak Cope MD   pravastatin (PRAVACHOL) 20 mg tablet TAKE 1 TABLET EVERY DAY 5/4/21  Yes Isak Cope MD   vit A/vit C/vit E/zinc/copper (PRESERVISION AREDS PO) Take  by mouth. Yes Provider, Historical   ACETAMINOPHEN (TYLENOL PO) Take  by mouth as needed. Yes Provider, Historical   furosemide (LASIX) 40 mg tablet Take 1 Tab by mouth daily as needed (swelling).   Patient not taking: Reported on 7/26/2021 9/3/20   Isak Cope MD         Allergies   Allergen Reactions    Lipitor [Atorvastatin] Myalgia    Pravastatin Myalgia    Zetia [Ezetimibe] Other (comments)     itching         REVIEW OF SYSTEMS:  Per HPI    PHYSICAL EXAM:  Visit Vitals  /74 (BP 1 Location: Left arm, BP Patient Position: Sitting, BP Cuff Size: Adult)   Pulse 72   Temp 98.5 °F (36.9 °C) (Oral)   Resp 16   Ht 5' 2\" (1.575 m)   Wt 103 lb 9.6 oz (47 kg)   SpO2 98%   BMI 18.95 kg/m²     Constitutional: Appears well-developed and well-nourished. No distress. HENT:   Head: Normocephalic and atraumatic. Eyes: No scleral icterus. Cardiovascular: Normal S1/S2, regular rhythm. No murmurs, rubs, or gallops. Pulmonary/Chest: Effort normal and breath sounds normal. No respiratory distress. No wheezes, rhonchi, or rales. Ext: No edema. 2+ left pedal pulse. +mild pain on palpation plantar aspect of left foot  Neurological: Alert. Psychiatric: Normal mood and affect. Behavior is normal.     Lab Results   Component Value Date/Time    Sodium 140 07/20/2021 10:38 AM    Potassium 4.4 07/20/2021 10:38 AM    Chloride 101 07/20/2021 10:38 AM    CO2 26 07/20/2021 10:38 AM    Anion gap 5 06/11/2019 03:02 AM    Glucose 102 (H) 07/20/2021 10:38 AM    BUN 14 07/20/2021 10:38 AM    Creatinine 0.95 07/20/2021 10:38 AM    BUN/Creatinine ratio 15 07/20/2021 10:38 AM    GFR est AA 61 07/20/2021 10:38 AM    GFR est non-AA 53 (L) 07/20/2021 10:38 AM    Calcium 9.1 07/20/2021 10:38 AM    Bilirubin, total 0.6 07/20/2021 10:38 AM    Alk. phosphatase 52 07/20/2021 10:38 AM    Protein, total 7.1 07/20/2021 10:38 AM    Albumin 4.7 (H) 07/20/2021 10:38 AM    Globulin 3.5 06/08/2019 05:52 PM    A-G Ratio 2.0 07/20/2021 10:38 AM    ALT (SGPT) 12 07/20/2021 10:38 AM     No results found for: HBA1C, SZW8LCCG, GWG1SCIO   Lab Results   Component Value Date/Time    Cholesterol, total 204 (H) 07/20/2021 10:38 AM    HDL Cholesterol 83 07/20/2021 10:38 AM    LDL, calculated 110 (H) 07/20/2021 10:38 AM    LDL, calculated 91 07/16/2020 09:58 AM    VLDL, calculated 11 07/20/2021 10:38 AM    VLDL, calculated 15 07/16/2020 09:58 AM    Triglyceride 63 07/20/2021 10:38 AM    CHOL/HDL Ratio 2.6 10/12/2010 08:57 AM          ASSESSMENT/PLAN  Diagnoses and all orders for this visit:    1. Dizziness    2. Left foot pain    3. Vertigo    4. Numbness and tingling of foot      Dizziness is better - continue meclizine if needed for dizzy spells. Try exercises/stretches for leg cramps and foot pain - especially before bed. There are no preventive care reminders to display for this patient. Follow-up and Dispositions    · Return in about 3 months (around 11/25/2021) for weight. Reviewed plan of care. Patient has provided input and agrees with goals. The nurse provided the patient and/or family with advanced directive information if needed and encouraged the patient to provide a copy to the office when available.

## 2021-08-25 NOTE — PATIENT INSTRUCTIONS
Dizziness is better - continue meclizine if needed for dizzy spells. Try exercises/stretches for leg cramps and foot pain - especially before bed. You gained 5# - this is great! Keep drinking Ensure. Nighttime Leg Cramps: Care Instructions  Your Care Instructions     Nighttime leg cramps happen when a leg muscle tightens up suddenly. This most often happens in the calf. But cramps in the thigh or foot are also common. Cramps often occur just as you fall asleep or wake up. Leg cramps can be painful. They can last a few seconds to a few minutes. Though they are common, experts don't know exactly what causes them. To treat muscle cramps, you can stretch and massage the muscle. If cramps keep coming back, your doctor may prescribe medicine that relaxes your muscles. Follow-up care is a key part of your treatment and safety. Be sure to make and go to all appointments, and call your doctor if you are having problems. It's also a good idea to know your test results and keep a list of the medicines you take. How can you care for yourself at home? · To stop a leg cramp, sit down and straighten your leg as you bend your foot up toward your knee. It may help to place a rolled towel under the ball of your foot and, while you hold the towel at both ends, gently pull the towel toward you while you keep your knee straight. This stretches the calf muscles. The cramp usually goes away after a few minutes. · Take a warm shower or bath to relax the muscle. Some people find that a heating pad placed on the muscle can also help. Others get relief by rubbing the calf with an ice pack. · Stretch your muscles every day, especially before and after exercise and at bedtime. Regular stretching can relax your muscles and may prevent cramps. · Do not suddenly increase the amount of exercise you get. Increase your exercise a little each week. · If your doctor prescribes medicine, take it exactly as prescribed.  Call your doctor if you think you are having a problem with your medicine. · Ask your doctor if you can take an over-the-counter pain medicine, such as acetaminophen (Tylenol), ibuprofen (Advil, Motrin), or naproxen (Aleve). Be safe with medicines. Read and follow all instructions on the label. · Drink plenty of fluids. If you have kidney, heart, or liver disease and have to limit fluids, talk with your doctor before you increase the amount of fluids you drink. When should you call for help? Watch closely for changes in your health, and be sure to contact your doctor if:    · You often have muscle cramps that do not go away after home treatment.     · Your muscle cramps often wake you up at night.     · You do not get better as expected. Where can you learn more? Go to http://www.gray.com/  Enter S910 in the search box to learn more about \"Nighttime Leg Cramps: Care Instructions. \"  Current as of: August 4, 2020               Content Version: 12.8  © 2006-2021 [employer]. Care instructions adapted under license by [employer] (which disclaims liability or warranty for this information). If you have questions about a medical condition or this instruction, always ask your healthcare professional. [place] [address] any warranty or liability for your use of this information. Arch Pain: Exercises  Introduction  Here are some examples of exercises for you to try. The exercises may be suggested for a condition or for rehabilitation. Start each exercise slowly. Ease off the exercises if you start to have pain. You will be told when to start these exercises and which ones will work best for you. How to do the exercises  Plantar fascia stretch   1. Sit in a chair and put your affected foot on your other knee. 2. Hold the heel of your foot in one hand, and grasp your toes with the other hand.   3. Pull on your heel (toward your body), and at the same time pull your toes back with your other hand. 4. You should feel a stretch along the bottom of your foot. 5. Hold 15 to 30 seconds. 6. Repeat 2 to 4 times. Plantar fascia stretch (kneeling)   You may want to place a pillow under your knees for this exercise. 1. Get on your hands and knees on the floor. Keep your heels pointing up and the balls of your feet and your toes on the floor. 2. Slowly sit back toward your ankles. 3. If this is too hard, you can try doing it one leg at a time. Stand up, and then kneel on one knee and keep the other leg forward. Place the foot of your forward leg flat on the ground and bend that knee. The heel on the leg still behind you should point up. The ball and toes of that foot should be on the floor. Sit back toward that ankle. 4. Hold 15 to 30 seconds. 5. Repeat 2 to 4 times. Switch legs if you are doing this one leg at a time. Plantar fascia self-massage   1. Sit in a chair. 2. Place your affected foot on a firm, tube-shaped object, such as a can or water bottle. 3. Roll your foot back and forth over the object to massage the bottom of your foot. 4. If you want to do ice massage, fill a water bottle about three-fourths of the way full and freeze before using. 5. Continue for 2 to 5 minutes. Bilateral calf stretch (knees straight)   1. Place a book on the floor a few inches from a wall or countertop, and put the balls of your feet on it. Your heels should be on the floor. The book needs to be thick enough so that you can feel a gentle stretch in each calf. If you are not steady on your feet, hold on to a chair, counter, or wall while you do this stretch. 2. Keep your knees straight, and lean forward until you feel a stretch in each calf. 3. To get more stretch, add another book or use a thicker book, such as a phone book, a dictionary, or an encyclopedia. 4. Hold the stretch for at least 15 to 30 seconds. 5. Repeat 2 to 4 times.     Bilateral calf stretch (knees bent)   1. Place a book on the floor a few inches from a wall or countertop, and put the balls of your feet on it. Your heels should be on the floor. The book needs to be thick enough so that you can feel a gentle stretch in each calf. If you are not steady on your feet, hold on to a chair, counter, or wall while you do this stretch. 2. Bend your knees, and lean forward until you feel a stretch in each calf. 3. To get more stretch, add another book or use a thicker book, such as a phone book, a dictionary, or an encyclopedia. 4. Hold the stretch for at least 15 to 30 seconds. 5. Repeat 2 to 4 times. [place] pick-ups   1. Put some marbles on the floor next to a cup.  2. Sit down, and use the toes of your affected foot to lift up one marble from the floor at a time. Then try to put the marble in the cup.  3. Repeat 8 to 12 times. Towel scrunches   1. Sit down, and place your affected foot on a towel on the floor. You may also do this with both feet on the towel. 2. Scrunch the towel toward you with your toes. Then use your toes to push the towel back into place. 3. Repeat 8 to 12 times. Heel raises on a step   1. Stand on the bottom step of a staircase, facing up toward the stairs. Put the balls of your feet on the step. If you are not steady on your feet, hold on to the banister or wall. 2. Keeping both knees straight, slowly lift your heels above the step so that you are standing on your toes. Then slowly lower your heels below the step and toward the floor. 3. Return to the starting position, with your feet even with the step. 4. Repeat 8 to 12 times. Follow-up care is a key part of your treatment and safety. Be sure to make and go to all appointments, and call your doctor if you are having problems. It's also a good idea to know your test results and keep a list of the medicines you take. Where can you learn more?   Go to http://www.gray.com/  Enter H119 in the search box to learn more about \"Arch Pain: Exercises. \"  Current as of: November 16, 2020               Content Version: 12.8  © 2006-2021 [employer]. Care instructions adapted under license by [employer] (which disclaims liability or warranty for this information). If you have questions about a medical condition or this instruction, always ask your healthcare professional. [place] [address] any warranty or liability for your use of this information.

## 2021-09-08 DIAGNOSIS — E78.00 HYPERCHOLESTEROLEMIA: ICD-10-CM

## 2021-09-08 RX ORDER — PRAVASTATIN SODIUM 20 MG/1
TABLET ORAL
Qty: 90 TABLET | Refills: 3 | Status: SHIPPED | OUTPATIENT
Start: 2021-09-08 | End: 2022-08-25

## 2021-10-11 ENCOUNTER — TELEPHONE (OUTPATIENT)
Dept: INTERNAL MEDICINE CLINIC | Age: 86
End: 2021-10-11

## 2021-10-11 DIAGNOSIS — E78.00 HYPERCHOLESTEROLEMIA: Primary | ICD-10-CM

## 2021-10-11 DIAGNOSIS — E55.9 VITAMIN D DEFICIENCY: ICD-10-CM

## 2021-10-11 NOTE — TELEPHONE ENCOUNTER
Patient daughter Hayden Choi called asking if Dr. Asher Bolivar can put in lab orders prior to patient appointment so they can go get labs done prior to next appointment in November. November 30th.  Mail the orders to [address] [facility], [place], [address] [facility]

## 2021-11-10 ENCOUNTER — CLINICAL SUPPORT (OUTPATIENT)
Dept: INTERNAL MEDICINE CLINIC | Age: 86
End: 2021-11-10
Payer: MEDICARE

## 2021-11-10 DIAGNOSIS — Z23 NEEDS FLU SHOT: Primary | ICD-10-CM

## 2021-11-10 NOTE — PATIENT INSTRUCTIONS
Vaccine Information Statement    Influenza (Flu) Vaccine (Inactivated or Recombinant): What You Need to Know    Many vaccine information statements are available in [place] and other languages. See www.immunize.org/vis. Hojas de información sobre vacunas están disponibles en español y en muchos otros idiomas. Visite www.immunize.org/vis. 1. Why get vaccinated? Influenza vaccine can prevent influenza (flu). Flu is a contagious disease that spreads around the [facility] every year, usually between October and May. Anyone can get the flu, but it is more dangerous for some people. Infants and young children, people 72 years and older, pregnant people, and people with certain health conditions or a weakened immune system are at greatest risk of flu complications. Pneumonia, bronchitis, sinus infections, and ear infections are examples of flu-related complications. If you have a medical condition, such as heart disease, cancer, or diabetes, flu can make it worse. Flu can cause fever and chills, sore throat, muscle aches, fatigue, cough, headache, and runny or stuffy nose. Some people may have vomiting and diarrhea, though this is more common in children than adults. In an average year, thousands of people in the [facility] die from flu, and many more are hospitalized. Flu vaccine prevents millions of illnesses and flu-related visits to the doctor each year. 2. Influenza vaccines     CDC recommends everyone 6 months and older get vaccinated every flu season. Children 6 months through 6years of age may need 2 doses during a single flu season. Everyone else needs only 1 dose each flu season. It takes about 2 weeks for protection to develop after vaccination. There are many flu viruses, and they are always changing. Each year a new flu vaccine is made to protect against the influenza viruses believed to be likely to cause disease in the upcoming flu season.  Even when the vaccine doesnt exactly match these viruses, it may still provide some protection. Influenza vaccine does not cause flu. Influenza vaccine may be given at the same time as other vaccines. 3. Talk with your health care provider    Tell your vaccination provider if the person getting the vaccine:   Has had an allergic reaction after a previous dose of influenza vaccine, or has any severe, life-threatening allergies    Has ever had Guillain-Barré Syndrome (also called GBS)    In some cases, your health care provider may decide to postpone influenza vaccination until a future visit. Influenza vaccine can be administered at any time during pregnancy. People who are or will be pregnant during influenza season should receive inactivated influenza vaccine. People with minor illnesses, such as a cold, may be vaccinated. People who are moderately or severely ill should usually wait until they recover before getting influenza vaccine. Your health care provider can give you more information. 4. Risks of a vaccine reaction     Soreness, redness, and swelling where the shot is given, fever, muscle aches, and headache can happen after influenza vaccination.  There may be a very small increased risk of Guillain-Barré Syndrome (GBS) after inactivated influenza vaccine (the flu shot). Stefani Lambert children who get the flu shot along with pneumococcal vaccine (PCV13) and/or DTaP vaccine at the same time might be slightly more likely to have a seizure caused by fever. Tell your health care provider if a child who is getting flu vaccine has ever had a seizure. People sometimes faint after medical procedures, including vaccination. Tell your provider if you feel dizzy or have vision changes or ringing in the ears. As with any medicine, there is a very remote chance of a vaccine causing a severe allergic reaction, other serious injury, or death. 5. What if there is a serious problem?     An allergic reaction could occur after the vaccinated person leaves the clinic. If you see signs of a severe allergic reaction (hives, swelling of the face and throat, difficulty breathing, a fast heartbeat, dizziness, or weakness), call 9-1-1 and get the person to the nearest hospital.    For other signs that concern you, call your health care provider. Adverse reactions should be reported to the Vaccine Adverse Event Reporting System (VAERS). Your health care provider will usually file this report, or you can do it yourself. Visit the VAERS website at www.vaers. [facility].gov or call 9-743.926.3852. VAERS is only for reporting reactions, and VAERS staff members do not give medical advice. 6. The National Vaccine Injury Compensation Program    The [facility] Vaccine Injury Compensation Program (VICP) is a federal program that was created to compensate people who may have been injured by certain vaccines. Claims regarding alleged injury or death due to vaccination have a time limit for filing, which may be as short as two years. Visit the VICP website at www.[place].gov/vaccinecompensation or call 0-684.366.7033 to learn about the program and about filing a claim. 7. How can I learn more?  Ask your health care provider.  Call your local or state health department.  Visit the website of the Food and Drug Administration (FDA) for vaccine package inserts and additional information at www.fda.gov/vaccines-blood-biologics/vaccines.  Contact the Centers for Disease Control and Prevention (CDC):  - Call 1-469.529.2982 (4-795-GGT-INFO) or  - Visit CDCs influenza website at www.cdc.gov/flu. Vaccine Information Statement   Inactivated Influenza Vaccine   8/6/2021  42 DANTE Davis [id number]   Department of Health and Human Services  Centers for Disease Control and Prevention    Office Use Only

## 2021-11-11 PROCEDURE — 90694 VACC AIIV4 NO PRSRV 0.5ML IM: CPT | Performed by: INTERNAL MEDICINE

## 2021-11-11 PROCEDURE — G0008 ADMIN INFLUENZA VIRUS VAC: HCPCS | Performed by: INTERNAL MEDICINE

## 2021-11-11 NOTE — PROGRESS NOTES
After verbal order read back of Dr Alberta Rao, patient received High Dose Flu Shot (Adjuvanted Fluad) in right deltoid. [address]: 41853-963-86 Lot: [id number] Exp: 06/30/2022. Patient tolerated procedure without complaints and received VIS.

## 2021-11-27 LAB
25(OH)D3+25(OH)D2 SERPL-MCNC: 37.3 NG/ML (ref 30–100)
ALBUMIN SERPL-MCNC: 4.4 G/DL (ref 3.6–4.6)
ALBUMIN/GLOB SERPL: 1.9 {RATIO} (ref 1.2–2.2)
ALP SERPL-CCNC: 48 IU/L (ref 44–121)
ALT SERPL-CCNC: 18 IU/L (ref 0–32)
AST SERPL-CCNC: 21 IU/L (ref 0–40)
BILIRUB SERPL-MCNC: 0.5 MG/DL (ref 0–1.2)
BUN SERPL-MCNC: 13 MG/DL (ref 8–27)
BUN/CREAT SERPL: 14 (ref 12–28)
CALCIUM SERPL-MCNC: 9 MG/DL (ref 8.7–10.3)
CHLORIDE SERPL-SCNC: 101 MMOL/L (ref 96–106)
CHOLEST SERPL-MCNC: 203 MG/DL (ref 100–199)
CO2 SERPL-SCNC: 27 MMOL/L (ref 20–29)
CREAT SERPL-MCNC: 0.9 MG/DL (ref 0.57–1)
GLOBULIN SER CALC-MCNC: 2.3 G/DL (ref 1.5–4.5)
GLUCOSE SERPL-MCNC: 101 MG/DL (ref 65–99)
HDLC SERPL-MCNC: 84 MG/DL
LDLC SERPL CALC-MCNC: 104 MG/DL (ref 0–99)
POTASSIUM SERPL-SCNC: 4.6 MMOL/L (ref 3.5–5.2)
PROT SERPL-MCNC: 6.7 G/DL (ref 6–8.5)
SODIUM SERPL-SCNC: 140 MMOL/L (ref 134–144)
TRIGL SERPL-MCNC: 82 MG/DL (ref 0–149)
VLDLC SERPL CALC-MCNC: 15 MG/DL (ref 5–40)

## 2021-11-30 ENCOUNTER — OFFICE VISIT (OUTPATIENT)
Dept: INTERNAL MEDICINE CLINIC | Age: 86
End: 2021-11-30
Payer: MEDICARE

## 2021-11-30 VITALS
BODY MASS INDEX: 19.29 KG/M2 | TEMPERATURE: 97.8 F | DIASTOLIC BLOOD PRESSURE: 64 MMHG | SYSTOLIC BLOOD PRESSURE: 128 MMHG | RESPIRATION RATE: 16 BRPM | OXYGEN SATURATION: 99 % | HEIGHT: 62 IN | HEART RATE: 74 BPM | WEIGHT: 104.8 LBS

## 2021-11-30 DIAGNOSIS — E55.9 VITAMIN D DEFICIENCY: ICD-10-CM

## 2021-11-30 DIAGNOSIS — E78.00 HYPERCHOLESTEROLEMIA: ICD-10-CM

## 2021-11-30 DIAGNOSIS — G62.9 NEUROPATHY: Primary | ICD-10-CM

## 2021-11-30 DIAGNOSIS — K21.9 GASTROESOPHAGEAL REFLUX DISEASE WITHOUT ESOPHAGITIS: ICD-10-CM

## 2021-11-30 PROCEDURE — G8536 NO DOC ELDER MAL SCRN: HCPCS | Performed by: INTERNAL MEDICINE

## 2021-11-30 PROCEDURE — 1101F PT FALLS ASSESS-DOCD LE1/YR: CPT | Performed by: INTERNAL MEDICINE

## 2021-11-30 PROCEDURE — G8510 SCR DEP NEG, NO PLAN REQD: HCPCS | Performed by: INTERNAL MEDICINE

## 2021-11-30 PROCEDURE — G8427 DOCREV CUR MEDS BY ELIG CLIN: HCPCS | Performed by: INTERNAL MEDICINE

## 2021-11-30 PROCEDURE — G8420 CALC BMI NORM PARAMETERS: HCPCS | Performed by: INTERNAL MEDICINE

## 2021-11-30 PROCEDURE — 99214 OFFICE O/P EST MOD 30 MIN: CPT | Performed by: INTERNAL MEDICINE

## 2021-11-30 PROCEDURE — 1090F PRES/ABSN URINE INCON ASSESS: CPT | Performed by: INTERNAL MEDICINE

## 2021-11-30 RX ORDER — PANTOPRAZOLE SODIUM 20 MG/1
20 TABLET, DELAYED RELEASE ORAL DAILY
Qty: 90 TABLET | Refills: 3 | Status: SHIPPED | OUTPATIENT
Start: 2021-11-30 | End: 2022-03-02

## 2021-11-30 RX ORDER — GABAPENTIN 100 MG/1
100 CAPSULE ORAL
Qty: 90 CAPSULE | Refills: 0 | Status: SHIPPED | OUTPATIENT
Start: 2021-11-30 | End: 2022-03-02 | Stop reason: SDUPTHER

## 2021-11-30 NOTE — PROGRESS NOTES
HPI  Ms. Se Humphreys is a 80y.o. year old female, she is seen today for follow up weight, cholesterol. No chest pain or sob, weight is up one pound  Wearing compression stockings and has less edema. Complains of chronic left foot pain, burning in nature especially distally and essentially only at night. Still with some abdominal pain, infrequent, better since being on pantoprazole. She wants to know if she can take a lower dose. Chief Complaint   Patient presents with    Weight Management    Results    Foot Problem     Left         Prior to Admission medications    Medication Sig Start Date End Date Taking? Authorizing Provider   gabapentin (NEURONTIN) 100 mg capsule Take 1 Capsule by mouth nightly. Max Daily Amount: 100 mg. 11/30/21  Yes Janel Rasmussen MD   pantoprazole (PROTONIX) 20 mg tablet Take 1 Tablet by mouth daily. 11/30/21  Yes Janel Rasmussen MD   pravastatin (PRAVACHOL) 20 mg tablet TAKE 1 TABLET EVERY DAY 9/8/21  Yes Janel Rasmussen MD   cholecalciferol (VITAMIN D3) (2,000 UNITS /50 MCG) cap capsule Take 1 Capsule by mouth daily. 7/26/21  Yes Janel Rasmussen MD   vit A/vit C/vit E/zinc/copper (PRESERVISION AREDS PO) Take  by mouth. Yes Provider, Historical   ACETAMINOPHEN (TYLENOL PO) Take  by mouth as needed. Yes Provider, Historical   furosemide (LASIX) 40 mg tablet Take 1 Tab by mouth daily as needed (swelling). Patient not taking: Reported on 7/26/2021 9/3/20   Janel Rasmussen MD         Allergies   Allergen Reactions    Lipitor [Atorvastatin] Myalgia    Pravastatin Myalgia    Zetia [Ezetimibe] Other (comments)     itching         REVIEW OF SYSTEMS:  Per HPI  PHYSICAL EXAM:  Visit Vitals  /64   Pulse 74   Temp 97.8 °F (36.6 °C) (Oral)   Resp 16   Ht 5' 2\" (1.575 m)   Wt 104 lb 12.8 oz (47.5 kg)   SpO2 99%   BMI 19.17 kg/m²     Constitutional: Appears well-developed and well-nourished. No distress. HENT:   Head: Normocephalic and atraumatic.    Eyes: No scleral icterus. Cardiovascular: Normal S1/S2, regular rhythm. No murmurs, rubs, or gallops. Pulmonary/Chest: Effort normal and breath sounds normal. No respiratory distress. No wheezes, rhonchi, or rales. Abdomen: Soft, NT/ND, +BS, no rebound or guarding, no masses, no HSM appreciated. Left foot: 2+ pedal pulse, +onychomycosis of toenails, no evidence of infection, decreased sensation to monofilament great toe  Ext: trace ankle edema. Neurological: Alert. Psychiatric: Normal mood and affect. Behavior is normal.     Lab Results   Component Value Date/Time    Sodium 140 11/26/2021 10:07 AM    Potassium 4.6 11/26/2021 10:07 AM    Chloride 101 11/26/2021 10:07 AM    CO2 27 11/26/2021 10:07 AM    Anion gap 5 06/11/2019 03:02 AM    Glucose 101 (H) 11/26/2021 10:07 AM    BUN 13 11/26/2021 10:07 AM    Creatinine 0.90 11/26/2021 10:07 AM    BUN/Creatinine ratio 14 11/26/2021 10:07 AM    GFR est AA 66 11/26/2021 10:07 AM    GFR est non-AA 57 (L) 11/26/2021 10:07 AM    Calcium 9.0 11/26/2021 10:07 AM    Bilirubin, total 0.5 11/26/2021 10:07 AM    Alk. phosphatase 48 11/26/2021 10:07 AM    Protein, total 6.7 11/26/2021 10:07 AM    Albumin 4.4 11/26/2021 10:07 AM    Globulin 3.5 06/08/2019 05:52 PM    A-G Ratio 1.9 11/26/2021 10:07 AM    ALT (SGPT) 18 11/26/2021 10:07 AM     No results found for: HBA1C, PUO3PBFR, AVF2FYBT   Lab Results   Component Value Date/Time    Cholesterol, total 203 (H) 11/26/2021 10:07 AM    HDL Cholesterol 84 11/26/2021 10:07 AM    LDL, calculated 104 (H) 11/26/2021 10:07 AM    LDL, calculated 91 07/16/2020 09:58 AM    VLDL, calculated 15 11/26/2021 10:07 AM    VLDL, calculated 15 07/16/2020 09:58 AM    Triglyceride 82 11/26/2021 10:07 AM    CHOL/HDL Ratio 2.6 10/12/2010 08:57 AM          ASSESSMENT/PLAN  Diagnoses and all orders for this visit:    1. Neuropathy  -     gabapentin (NEURONTIN) 100 mg capsule; Take 1 Capsule by mouth nightly.  Max Daily Amount: 100 mg.    2. Hypercholesterolemia    3. Gastroesophageal reflux disease without esophagitis  -     pantoprazole (PROTONIX) 20 mg tablet; Take 1 Tablet by mouth daily. 4. Vitamin D deficiency      GERD has been better controlled. Will refill at lower dose 20 mg daily. Cholesterol is acceptable. Continue statin. Vitamin D is at goal.  Continue vitamin D. Add gabapentin 100 mg at bedtime for neuropathy, potential side effects discussed. There are no preventive care reminders to display for this patient. Follow-up and Dispositions    · Return in about 3 months (around 2/28/2022) for med eval.            Reviewed plan of care. Patient has provided input and agrees with goals. The nurse provided the patient and/or family with advanced directive information if needed and encouraged the patient to provide a copy to the office when available.

## 2021-11-30 NOTE — PROGRESS NOTES
Jazzmine Arriaga  Identified pt with two pt identifiers(name and ). No chief complaint on file. Reviewed record In preparation for visit and have obtained necessary documentation. 1. Have you been to the ER, urgent care clinic or hospitalized since your last visit? No     2. Have you seen or consulted any other health care providers outside of the [address] [place] [address] since your last visit? Include any pap smears or colon screening. No    Patient does not have an advance directive. Vitals reviewed with provider. Health Maintenance reviewed: There are no preventive care reminders to display for this patient. Wt Readings from Last 3 Encounters:   21 103 lb 9.6 oz (47 kg)   21 98 lb 12.8 oz (44.8 kg)   21 104 lb 6.4 oz (47.4 kg)      Temp Readings from Last 3 Encounters:   21 98.5 °F (36.9 °C) (Oral)   21 98.1 °F (36.7 °C) (Oral)   21 97.8 °F (36.6 °C) (Oral)      BP Readings from Last 3 Encounters:   21 133/74   21 133/74   21 130/74      Pulse Readings from Last 3 Encounters:   21 72   21 75   21 72      There were no vitals filed for this visit. Learning Assessment:   :     Learning Assessment 2014   PRIMARY LEARNER Patient   HIGHEST LEVEL OF EDUCATION - PRIMARY LEARNER  DID NOT GRADUATE HIGH SCHOOL   BARRIERS PRIMARY LEARNER NONE   CO-LEARNER CAREGIVER No   PRIMARY LANGUAGE ENGLISH   LEARNER PREFERENCE PRIMARY DEMONSTRATION   ANSWERED BY patient   RELATIONSHIP SELF        Depression Screening:   :     3 most recent PHQ Screens 2021   Little interest or pleasure in doing things Not at all   Feeling down, depressed, irritable, or hopeless Several days   Total Score PHQ 2 1        Fall Risk Assessment:   :     Fall Risk Assessment, last 12 mths 2021   Able to walk? Yes   Fall in past 12 months? 0   Do you feel unsteady?  0   Are you worried about falling 0   Number of falls in past 12 months - Fall with injury? -        Abuse Screening:   :     Abuse Screening Questionnaire 1/23/2020 5/8/2019 11/7/2017 11/10/2016 6/6/2014   Do you ever feel afraid of your partner? N N N N N   Are you in a relationship with someone who physically or mentally threatens you? N N N N N   Is it safe for you to go home?  Y Y Y Y Y        ADL Screening:   :     ADL Assessment 11/7/2017   Feeding yourself No Help Needed   Getting from bed to chair No Help Needed   Getting dressed No Help Needed   Bathing or showering No Help Needed   Walk across the room (includes cane/walker) No Help Needed   Using the telphone No Help Needed   Taking your medications No Help Needed   Preparing meals No Help Needed   Managing money (expenses/bills) No Help Needed   Moderately strenuous housework (laundry) No Help Needed   Shopping for personal items (toiletries/medicines) No Help Needed   Shopping for groceries No Help Needed   Driving No Help Needed   Climbing a flight of stairs No Help Needed   Getting to places beyond walking distances No Help Needed

## 2021-12-20 ENCOUNTER — TELEPHONE (OUTPATIENT)
Dept: INTERNAL MEDICINE CLINIC | Age: 86
End: 2021-12-20

## 2021-12-20 NOTE — TELEPHONE ENCOUNTER
Dheeraj Suresh stated the patient has not received medication for her leg cramping.     # [phone]

## 2021-12-20 NOTE — TELEPHONE ENCOUNTER
Returned phone call to Albert Chavez on behalf of pt, left a generic vm for a call back to the office.

## 2021-12-21 NOTE — TELEPHONE ENCOUNTER
Called [facility] on behalf of pt, HIPAA verified by two patient identifiers. Called in reference to Gabapentin and Pantoprazole. After speaking with Kelsey Tamayo, she states that they have not received the 2 rxs. The pharm tech at [facility] tracked the order and states that they were delivered to the address that we have on file (verified it). Pharm tech gave me the number to clinical services [phone] and advised that the pt will need to call explain that she has not received the rxs and start the process of them shipping out again. Left a detailed message on [address] as requested with next steps. Advised to call the office if there were any other questions or concerns.

## 2022-03-02 ENCOUNTER — OFFICE VISIT (OUTPATIENT)
Dept: INTERNAL MEDICINE CLINIC | Age: 87
End: 2022-03-02
Payer: MEDICARE

## 2022-03-02 VITALS
HEIGHT: 62 IN | RESPIRATION RATE: 18 BRPM | SYSTOLIC BLOOD PRESSURE: 121 MMHG | HEART RATE: 71 BPM | WEIGHT: 106.8 LBS | OXYGEN SATURATION: 99 % | BODY MASS INDEX: 19.65 KG/M2 | DIASTOLIC BLOOD PRESSURE: 68 MMHG | TEMPERATURE: 97.6 F

## 2022-03-02 DIAGNOSIS — E55.9 VITAMIN D DEFICIENCY: ICD-10-CM

## 2022-03-02 DIAGNOSIS — E78.00 HYPERCHOLESTEROLEMIA: ICD-10-CM

## 2022-03-02 DIAGNOSIS — R68.89 OTHER GENERAL SYMPTOMS AND SIGNS: ICD-10-CM

## 2022-03-02 DIAGNOSIS — K21.9 GASTROESOPHAGEAL REFLUX DISEASE WITHOUT ESOPHAGITIS: ICD-10-CM

## 2022-03-02 DIAGNOSIS — Z00.00 MEDICARE ANNUAL WELLNESS VISIT, SUBSEQUENT: Primary | ICD-10-CM

## 2022-03-02 DIAGNOSIS — G62.9 NEUROPATHY: ICD-10-CM

## 2022-03-02 PROCEDURE — G8510 SCR DEP NEG, NO PLAN REQD: HCPCS | Performed by: INTERNAL MEDICINE

## 2022-03-02 PROCEDURE — 99213 OFFICE O/P EST LOW 20 MIN: CPT | Performed by: INTERNAL MEDICINE

## 2022-03-02 PROCEDURE — G0439 PPPS, SUBSEQ VISIT: HCPCS | Performed by: INTERNAL MEDICINE

## 2022-03-02 PROCEDURE — G8536 NO DOC ELDER MAL SCRN: HCPCS | Performed by: INTERNAL MEDICINE

## 2022-03-02 PROCEDURE — G8420 CALC BMI NORM PARAMETERS: HCPCS | Performed by: INTERNAL MEDICINE

## 2022-03-02 PROCEDURE — G8427 DOCREV CUR MEDS BY ELIG CLIN: HCPCS | Performed by: INTERNAL MEDICINE

## 2022-03-02 PROCEDURE — 1101F PT FALLS ASSESS-DOCD LE1/YR: CPT | Performed by: INTERNAL MEDICINE

## 2022-03-02 PROCEDURE — 1090F PRES/ABSN URINE INCON ASSESS: CPT | Performed by: INTERNAL MEDICINE

## 2022-03-02 RX ORDER — PANTOPRAZOLE SODIUM 40 MG/1
40 TABLET, DELAYED RELEASE ORAL DAILY
Qty: 90 TABLET | Refills: 2 | Status: SHIPPED | OUTPATIENT
Start: 2022-03-02

## 2022-03-02 RX ORDER — GABAPENTIN 100 MG/1
100 CAPSULE ORAL
Qty: 90 CAPSULE | Refills: 0 | Status: SHIPPED | OUTPATIENT
Start: 2022-03-02 | End: 2022-06-06 | Stop reason: DRUGHIGH

## 2022-03-02 NOTE — PROGRESS NOTES
Chief Complaint   Patient presents with    Medication Evaluation        Visit Vitals  /68 (BP 1 Location: Left upper arm, BP Patient Position: Sitting, BP Cuff Size: Adult)   Pulse 71   Temp 97.6 °F (36.4 °C) (Oral)   Resp 18   Ht 5' 2\" (1.575 m)   Wt 106 lb 12.8 oz (48.4 kg)   SpO2 99%   BMI 19.53 kg/m²        1. Have you been to the ER, urgent care clinic since your last visit? Hospitalized since your last visit? No    2. Have you seen or consulted any other health care providers outside of the [address] [place] [address] since your last visit? Include any pap smears or colon screening. No     Health Maintenance Due   Topic Date Due    Medicare Yearly Exam  01/19/2022        3 most recent PHQ Screens 3/2/2022   Little interest or pleasure in doing things Not at all   Feeling down, depressed, irritable, or hopeless Not at all   Total Score PHQ 2 0        Fall Risk Assessment, last 12 mths 11/30/2021   Able to walk? Yes   Fall in past 12 months? 0   Do you feel unsteady? 0   Are you worried about falling 0   Number of falls in past 12 months -   Fall with injury?  -       Learning Assessment 6/6/2014   PRIMARY LEARNER Patient   HIGHEST LEVEL OF EDUCATION - PRIMARY LEARNER  DID NOT GRADUATE HIGH SCHOOL   BARRIERS PRIMARY LEARNER NONE   CO-LEARNER CAREGIVER No   PRIMARY LANGUAGE ENGLISH   LEARNER PREFERENCE PRIMARY DEMONSTRATION   ANSWERED BY patient   RELATIONSHIP SELF

## 2022-03-02 NOTE — PATIENT INSTRUCTIONS
Medicare Wellness Visit, Female     The best way to live healthy is to have a lifestyle where you eat a well-balanced diet, exercise regularly, limit alcohol use, and quit all forms of tobacco/nicotine, if applicable. Regular preventive services are another way to keep healthy. Preventive services (vaccines, screening tests, monitoring & exams) can help personalize your care plan, which helps you manage your own care. Screening tests can find health problems at the earliest stages, when they are easiest to treat. [facility] follows the current, evidence-based guidelines published by the [facility] Emil[facility] Connor ([facility]) when recommending preventive services for our patients. Because we follow these guidelines, sometimes recommendations change over time as research supports it. (For example, mammograms used to be recommended annually. Even though Medicare will still pay for an annual mammogram, the newer guidelines recommend a mammogram every two years for women of average risk). Of course, you and your doctor may decide to screen more often for some diseases, based on your risk and your co-morbidities (chronic disease you are already diagnosed with). Preventive services for you include:  - Medicare offers their members a free annual wellness visit, which is time for you and your primary care provider to discuss and plan for your preventive service needs. Take advantage of this benefit every year!  -All adults over the age of 72 should receive the recommended pneumonia vaccines. Current USPSTF guidelines recommend a series of two vaccines for the best pneumonia protection.   -All adults should have a flu vaccine yearly and a tetanus vaccine every 10 years.   -All adults age 48 and older should receive the shingles vaccines (series of two vaccines).       -All adults age 38-68 who are overweight should have a diabetes screening test once every three years.   -All adults born between [birth year] and 1965 should be screened once for Hepatitis C.  -Other screening tests and preventive services for persons with diabetes include: an eye exam to screen for diabetic retinopathy, a kidney function test, a foot exam, and stricter control over your cholesterol.   -Cardiovascular screening for adults with routine risk involves an electrocardiogram (ECG) at intervals determined by your doctor.   -Colorectal cancer screenings should be done for adults age 54-65 with no increased risk factors for colorectal cancer. There are a number of acceptable methods of screening for this type of cancer. Each test has its own benefits and drawbacks. Discuss with your doctor what is most appropriate for you during your annual wellness visit. The different tests include: colonoscopy (considered the best screening method), a fecal occult blood test, a fecal DNA test, and sigmoidoscopy.    -A bone mass density test is recommended when a woman turns 65 to screen for osteoporosis. This test is only recommended one time, as a screening. Some providers will use this same test as a disease monitoring tool if you already have osteoporosis. -Breast cancer screenings are recommended every other year for women of normal risk, age 54-69.  -Cervical cancer screenings for women over age 72 are only recommended with certain risk factors. Here is a list of your current Health Maintenance items (your personalized list of preventive services) with a due date: There are no preventive care reminders to display for this patient.

## 2022-03-02 NOTE — PROGRESS NOTES
HPI  Ms. Blanche Potts is a 80y.o. year old female, she is seen today for AWV, follow up neuropathy after adding gabapentin and gerd after decreasing dose protonix. GERD - never got 20mg dose of pantoprazole either - still has some intermittent abdominal pain - epigastric discomfort with certain foods - peppers    Neuropathy - never got prescription for neuropathy - says getting worse  She is getting ready to move in to an attached apartment on her daughter's home which is currently under construction. Chief Complaint   Patient presents with    Medication Evaluation        Prior to Admission medications    Medication Sig Start Date End Date Taking? Authorizing Provider   gabapentin (NEURONTIN) 100 mg capsule Take 1 Capsule by mouth nightly. Max Daily Amount: 100 mg. 3/2/22  Yes Luc Campbell MD   pantoprazole (PROTONIX) 40 mg tablet Take 1 Tablet by mouth daily. 3/2/22  Yes Luc Campbell MD   pravastatin (PRAVACHOL) 20 mg tablet TAKE 1 TABLET EVERY DAY 9/8/21  Yes Luc Campbell MD   cholecalciferol (VITAMIN D3) (2,000 UNITS /50 MCG) cap capsule Take 1 Capsule by mouth daily. 7/26/21  Yes Luc Campbell MD   vit A/vit C/vit E/zinc/copper (PRESERVISION AREDS PO) Take  by mouth. Yes Provider, Historical   ACETAMINOPHEN (TYLENOL PO) Take  by mouth as needed. Yes Provider, Historical   gabapentin (NEURONTIN) 100 mg capsule Take 1 Capsule by mouth nightly. Max Daily Amount: 100 mg. 11/30/21 3/2/22  Luc Campbell MD   pantoprazole (PROTONIX) 20 mg tablet Take 1 Tablet by mouth daily. 11/30/21 3/2/22  Luc Campbell MD   furosemide (LASIX) 40 mg tablet Take 1 Tab by mouth daily as needed (swelling).   Patient not taking: Reported on 7/26/2021 9/3/20   Luc Campbell MD         Allergies   Allergen Reactions    Lipitor [Atorvastatin] Myalgia    Pravastatin Myalgia    Zetia [Ezetimibe] Other (comments)     itching         REVIEW OF SYSTEMS:  Per HPI    PHYSICAL EXAM:  Visit Vitals  /68 (BP 1 Location: Left upper arm, BP Patient Position: Sitting, BP Cuff Size: Adult)   Pulse 71   Temp 97.6 °F (36.4 °C) (Oral)   Resp 18   Ht 5' 2\" (1.575 m)   Wt 106 lb 12.8 oz (48.4 kg)   SpO2 99%   BMI 19.53 kg/m²     Constitutional: Appears well-developed and well-nourished. No distress. HENT:   Head: Normocephalic and atraumatic. Eyes: No scleral icterus. Neck: no lad, no tm, supple   Cardiovascular: Normal S1/S2, regular rhythm. No murmurs, rubs, or gallops. Pulmonary/Chest: Effort normal and breath sounds normal. No respiratory distress. No wheezes, rhonchi, or rales. Abdomen: Soft, NT/ND, +BS, no rebound or guarding, no masses, no HSM appreciated. Ext: No edema. Neurological: Alert. Psychiatric: Normal mood and affect. Behavior is normal.     Lab Results   Component Value Date/Time    Sodium 140 11/26/2021 10:07 AM    Potassium 4.6 11/26/2021 10:07 AM    Chloride 101 11/26/2021 10:07 AM    CO2 27 11/26/2021 10:07 AM    Anion gap 5 06/11/2019 03:02 AM    Glucose 101 (H) 11/26/2021 10:07 AM    BUN 13 11/26/2021 10:07 AM    Creatinine 0.90 11/26/2021 10:07 AM    BUN/Creatinine ratio 14 11/26/2021 10:07 AM    GFR est AA 66 11/26/2021 10:07 AM    GFR est non-AA 57 (L) 11/26/2021 10:07 AM    Calcium 9.0 11/26/2021 10:07 AM    Bilirubin, total 0.5 11/26/2021 10:07 AM    Alk.  phosphatase 48 11/26/2021 10:07 AM    Protein, total 6.7 11/26/2021 10:07 AM    Albumin 4.4 11/26/2021 10:07 AM    Globulin 3.5 06/08/2019 05:52 PM    A-G Ratio 1.9 11/26/2021 10:07 AM    ALT (SGPT) 18 11/26/2021 10:07 AM     No results found for: HBA1C, PSV9BIGQ, IMB0QULV   Lab Results   Component Value Date/Time    Cholesterol, total 203 (H) 11/26/2021 10:07 AM    HDL Cholesterol 84 11/26/2021 10:07 AM    LDL, calculated 104 (H) 11/26/2021 10:07 AM    LDL, calculated 91 07/16/2020 09:58 AM    VLDL, calculated 15 11/26/2021 10:07 AM    VLDL, calculated 15 07/16/2020 09:58 AM    Triglyceride 82 11/26/2021 10:07 AM    CHOL/HDL Ratio 2.6 10/12/2010 08:57 AM          ASSESSMENT/PLAN  Diagnoses and all orders for this visit:    1. Medicare annual wellness visit, subsequent    2. Neuropathy  -     gabapentin (NEURONTIN) 100 mg capsule; Take 1 Capsule by mouth nightly. Max Daily Amount: 100 mg.  -     CBC WITH AUTOMATED DIFF; Future    3. Gastroesophageal reflux disease without esophagitis  -     pantoprazole (PROTONIX) 40 mg tablet; Take 1 Tablet by mouth daily. 4. Hypercholesterolemia  -     METABOLIC PANEL, COMPREHENSIVE; Future  -     TSH 3RD GENERATION; Future  -     T4, FREE; Future  -     LIPID PANEL; Future    5. Vitamin D deficiency  -     VITAMIN D, 25 HYDROXY; Future    6. Other general symptoms and signs  -     VITAMIN B12; Future    sent rx for gabapentin again - advised patient to notify me if she does not receive in the mail. Still having GERD symptoms, will continue current dose of pantoprazole 40 mg daily. Neuropathy check labs above including thyroid, CBC, B12, and vitamin D. There are no preventive care reminders to display for this patient. Follow-up and Dispositions    · Return in about 3 months (around 6/2/2022) for neuropathy, GERD. Reviewed plan of care. Patient has provided input and agrees with goals. The nurse provided the patient and/or family with advanced directive information if needed and encouraged the patient to provide a copy to the office when available. This is the Subsequent Medicare Annual Wellness Exam, performed 12 months or more after the Initial AWV or the last Subsequent AWV    I have reviewed the patient's medical history in detail and updated the computerized patient record. Assessment/Plan   Education and counseling provided:  Are appropriate based on today's review and evaluation    1. Medicare annual wellness visit, subsequent  2. Neuropathy  -     gabapentin (NEURONTIN) 100 mg capsule; Take 1 Capsule by mouth nightly. Max Daily Amount: 100 mg., Normal, Disp-90 Capsule, R-0  -     CBC WITH AUTOMATED DIFF; Future  3. Gastroesophageal reflux disease without esophagitis  -     pantoprazole (PROTONIX) 40 mg tablet; Take 1 Tablet by mouth daily. , Normal, Disp-90 Tablet, R-2  4. Hypercholesterolemia  -     METABOLIC PANEL, COMPREHENSIVE; Future  -     TSH 3RD GENERATION; Future  -     T4, FREE; Future  -     LIPID PANEL; Future  5. Vitamin D deficiency  -     VITAMIN D, 25 HYDROXY; Future  6. Other general symptoms and signs  -     VITAMIN B12; Future       Depression Risk Factor Screening     3 most recent PHQ Screens 3/2/2022   Little interest or pleasure in doing things Not at all   Feeling down, depressed, irritable, or hopeless Not at all   Total Score PHQ 2 0       Alcohol & Drug Abuse Risk Screen    Do you average more than 1 drink per night or more than 7 drinks a week:  No    On any one occasion in the past three months have you have had more than 3 drinks containing alcohol:  No          Functional Ability and Level of Safety    Hearing: The patient wears hearing aids. Activities of Daily Living: The home contains: no safety equipment. Patient needs help with:  transportation      Ambulation: with no difficulty     Fall Risk:  Fall Risk Assessment, last 12 mths 11/30/2021   Able to walk? Yes   Fall in past 12 months? 0   Do you feel unsteady? 0   Are you worried about falling 0   Number of falls in past 12 months -   Fall with injury? -      Abuse Screen:  Patient is not abused       Cognitive Screening    Has your family/caregiver stated any concerns about your memory: yes - slight memory loss - slower recall     Cognitive Screening: N/A    Health Maintenance Due     There are no preventive care reminders to display for this patient.     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 0XD    Caregiver stress Z63.6    Wears hearing aid Z97.4    At high risk for falls Z91.81    Localized edema R60.0     Past Medical History:   Diagnosis Date    Arthritis     GERD (gastroesophageal reflux disease)     Hx-TIA (transient ischemic attack) 2006    Hypercholesterolemia     Osteoporosis     Other ill-defined conditions(799.89)     high cholesterol    S/P colonoscopy 2001      Past Surgical History:   Procedure Laterality Date    HX ORTHOPAEDIC  2010    left hip replacement    GA COLONOSCOPY FLX DX W/COLLJ SPEC WHEN PFRMD  5/25/2012         STRESS TEST CARDIAC  2007    UPPER GI ENDOSCOPY,BIOPSY  6/10/2019          Current Outpatient Medications   Medication Sig Dispense Refill    gabapentin (NEURONTIN) 100 mg capsule Take 1 Capsule by mouth nightly. Max Daily Amount: 100 mg. 90 Capsule 0    pantoprazole (PROTONIX) 40 mg tablet Take 1 Tablet by mouth daily. 90 Tablet 2    pravastatin (PRAVACHOL) 20 mg tablet TAKE 1 TABLET EVERY DAY 90 Tablet 3    cholecalciferol (VITAMIN D3) (2,000 UNITS /50 MCG) cap capsule Take 1 Capsule by mouth daily. 90 Capsule 4    vit A/vit C/vit E/zinc/copper (PRESERVISION AREDS PO) Take  by mouth.  ACETAMINOPHEN (TYLENOL PO) Take  by mouth as needed.  furosemide (LASIX) 40 mg tablet Take 1 Tab by mouth daily as needed (swelling).  (Patient not taking: Reported on 7/26/2021) 30 Tab 1     Allergies   Allergen Reactions    Lipitor [Atorvastatin] Myalgia    Pravastatin Myalgia    Zetia [Ezetimibe] Other (comments)     itching       Family History   Problem Relation Age of Onset    Diabetes Mother     Hypertension Mother     Stroke Mother      Social History     Tobacco Use    Smoking status: Never Smoker    Smokeless tobacco: Never Used   Substance Use Topics    Alcohol use: No         Chito Nieves MD

## 2022-03-19 PROBLEM — Z63.6 CAREGIVER STRESS: Status: ACTIVE | Noted: 2019-05-08

## 2022-03-19 PROBLEM — R60.0 LOCALIZED EDEMA: Status: ACTIVE | Noted: 2021-01-18

## 2022-03-19 PROBLEM — M80.08XD PATHOLOGICAL FRACTURE OF VERTEBRA DUE TO OSTEOPOROSIS WITH ROUTINE HEALING: Status: ACTIVE | Noted: 2017-05-31

## 2022-03-19 PROBLEM — Z91.81 AT HIGH RISK FOR FALLS: Status: ACTIVE | Noted: 2021-01-18

## 2022-03-20 PROBLEM — Z97.4 WEARS HEARING AID: Status: ACTIVE | Noted: 2019-06-02

## 2022-06-06 ENCOUNTER — OFFICE VISIT (OUTPATIENT)
Dept: INTERNAL MEDICINE CLINIC | Age: 87
End: 2022-06-06
Payer: MEDICARE

## 2022-06-06 VITALS
HEART RATE: 72 BPM | RESPIRATION RATE: 16 BRPM | OXYGEN SATURATION: 98 % | BODY MASS INDEX: 18.88 KG/M2 | WEIGHT: 102.6 LBS | DIASTOLIC BLOOD PRESSURE: 72 MMHG | TEMPERATURE: 97.9 F | HEIGHT: 62 IN | SYSTOLIC BLOOD PRESSURE: 130 MMHG

## 2022-06-06 DIAGNOSIS — R07.9 CHEST PAIN, UNSPECIFIED TYPE: Primary | ICD-10-CM

## 2022-06-06 DIAGNOSIS — S81.812A SKIN TEAR OF LEFT LOWER LEG WITHOUT COMPLICATION, INITIAL ENCOUNTER: ICD-10-CM

## 2022-06-06 DIAGNOSIS — R06.00 DYSPNEA, UNSPECIFIED TYPE: ICD-10-CM

## 2022-06-06 DIAGNOSIS — G62.9 NEUROPATHY: ICD-10-CM

## 2022-06-06 PROCEDURE — 1090F PRES/ABSN URINE INCON ASSESS: CPT | Performed by: INTERNAL MEDICINE

## 2022-06-06 PROCEDURE — 1101F PT FALLS ASSESS-DOCD LE1/YR: CPT | Performed by: INTERNAL MEDICINE

## 2022-06-06 PROCEDURE — G8536 NO DOC ELDER MAL SCRN: HCPCS | Performed by: INTERNAL MEDICINE

## 2022-06-06 PROCEDURE — G8420 CALC BMI NORM PARAMETERS: HCPCS | Performed by: INTERNAL MEDICINE

## 2022-06-06 PROCEDURE — 1123F ACP DISCUSS/DSCN MKR DOCD: CPT | Performed by: INTERNAL MEDICINE

## 2022-06-06 PROCEDURE — 93000 ELECTROCARDIOGRAM COMPLETE: CPT | Performed by: INTERNAL MEDICINE

## 2022-06-06 PROCEDURE — 99214 OFFICE O/P EST MOD 30 MIN: CPT | Performed by: INTERNAL MEDICINE

## 2022-06-06 PROCEDURE — G8510 SCR DEP NEG, NO PLAN REQD: HCPCS | Performed by: INTERNAL MEDICINE

## 2022-06-06 PROCEDURE — G8427 DOCREV CUR MEDS BY ELIG CLIN: HCPCS | Performed by: INTERNAL MEDICINE

## 2022-06-06 RX ORDER — GABAPENTIN 100 MG/1
100-300 CAPSULE ORAL
Qty: 270 CAPSULE | Refills: 1 | Status: SHIPPED | OUTPATIENT
Start: 2022-06-06

## 2022-06-06 NOTE — PROGRESS NOTES
HPI  Ms. Cipriano Conroy is a 80y.o. year old female, she is seen today for follow up neuropathy. Burning in feet is the same, some during the day but worse at night. Would wake every night with burning in feet prior to starting gabapentin but now will sleep through the night 1-2 days per week. Car door injured leg - left - always left leg with injuries and wounds which are difficult to heal.   Was getting out of car and corner of door hit her leg - 5/21/22. Healing. Complains of intermittent sob. Will feel really tired and feel sob, lasting a few minutes - no pattern - at rest and also with exertion. No PND or orthopnea. Will get chest pain during day at times, will feel really tired and sob and chest discomfort on left side of chest to left arm. Last spell yesterday. Was doing nothing at the time, but was feeling stressed from son talking a lot. Daughter feels stress triggers it. Appetite stable. No heartburn or indigestion. Chief Complaint   Patient presents with    Peripheral Neuropathy     Room 2B //     GERD        Prior to Admission medications    Medication Sig Start Date End Date Taking? Authorizing Provider   gabapentin (NEURONTIN) 100 mg capsule Take 1-3 Capsules by mouth nightly as needed for Pain. Max Daily Amount: 300 mg. Indications: neuropathic pain 6/6/22  Yes Jesus Manuel Garner MD   pantoprazole (PROTONIX) 40 mg tablet Take 1 Tablet by mouth daily. 3/2/22  Yes Jesus Manuel Garner MD   pravastatin (PRAVACHOL) 20 mg tablet TAKE 1 TABLET EVERY DAY 9/8/21  Yes Jesus Manuel Garner MD   cholecalciferol (VITAMIN D3) (2,000 UNITS /50 MCG) cap capsule Take 1 Capsule by mouth daily. 7/26/21  Yes Jesus Manuel Garner MD   furosemide (LASIX) 40 mg tablet Take 1 Tab by mouth daily as needed (swelling). 9/3/20  Yes Jesus Manuel Garner MD   vit A/vit C/vit E/zinc/copper (PRESERVISION AREDS PO) Take  by mouth. Yes Provider, Historical   ACETAMINOPHEN (TYLENOL PO) Take  by mouth as needed. Yes Provider, Historical   gabapentin (NEURONTIN) 100 mg capsule Take 1 Capsule by mouth nightly. Max Daily Amount: 100 mg. 3/2/22 6/6/22  Anabel Cleaning MD         Allergies   Allergen Reactions    Lipitor [Atorvastatin] Myalgia    Pravastatin Myalgia    Zetia [Ezetimibe] Other (comments)     itching         REVIEW OF SYSTEMS:  Per HPI    PHYSICAL EXAM:  Visit Vitals  /72   Pulse 72   Temp 97.9 °F (36.6 °C) (Oral)   Resp 16   Ht 5' 2\" (1.575 m)   Wt 102 lb 9.6 oz (46.5 kg)   SpO2 98%   BMI 18.77 kg/m²     Constitutional: Appears well-developed and well-nourished. No distress. HENT:   Head: Normocephalic and atraumatic. Eyes: No scleral icterus. Cardiovascular: Normal S1/S2, regular rhythm. No murmurs, rubs, or gallops. Pulmonary/Chest: Effort normal and breath sounds normal. No respiratory distress. No wheezes, rhonchi, or rales. Abdomen: Soft, NT/ND, +BS, no rebound or guarding, no masses, no HSM appreciated. Back: +kyphosis  Left leg: approx 2cm skin tear left anterior leg with scab  Ext: No edema. Neurological: Alert. Psychiatric: Normal mood and affect. Behavior is normal.     Lab Results   Component Value Date/Time    Sodium 140 11/26/2021 10:07 AM    Potassium 4.6 11/26/2021 10:07 AM    Chloride 101 11/26/2021 10:07 AM    CO2 27 11/26/2021 10:07 AM    Anion gap 5 06/11/2019 03:02 AM    Glucose 101 (H) 11/26/2021 10:07 AM    BUN 13 11/26/2021 10:07 AM    Creatinine 0.90 11/26/2021 10:07 AM    BUN/Creatinine ratio 14 11/26/2021 10:07 AM    GFR est AA 66 11/26/2021 10:07 AM    GFR est non-AA 57 (L) 11/26/2021 10:07 AM    Calcium 9.0 11/26/2021 10:07 AM    Bilirubin, total 0.5 11/26/2021 10:07 AM    Alk.  phosphatase 48 11/26/2021 10:07 AM    Protein, total 6.7 11/26/2021 10:07 AM    Albumin 4.4 11/26/2021 10:07 AM    Globulin 3.5 06/08/2019 05:52 PM    A-G Ratio 1.9 11/26/2021 10:07 AM    ALT (SGPT) 18 11/26/2021 10:07 AM     No results found for: HBA1C, NFG2UDPK, UCP7BOCH   Lab Results   Component Value Date/Time    Cholesterol, total 203 (H) 11/26/2021 10:07 AM    HDL Cholesterol 84 11/26/2021 10:07 AM    LDL, calculated 104 (H) 11/26/2021 10:07 AM    LDL, calculated 91 07/16/2020 09:58 AM    VLDL, calculated 15 11/26/2021 10:07 AM    VLDL, calculated 15 07/16/2020 09:58 AM    Triglyceride 82 11/26/2021 10:07 AM    CHOL/HDL Ratio 2.6 10/12/2010 08:57 AM          ASSESSMENT/PLAN  Diagnoses and all orders for this visit:    1. Chest pain, unspecified type  -     AMB POC EKG ROUTINE W/ 12 LEADS, INTER & REP  -     REFERRAL TO CARDIOLOGY    2. Dyspnea, unspecified type  -     REFERRAL TO CARDIOLOGY    3. Neuropathy  -     gabapentin (NEURONTIN) 100 mg capsule; Take 1-3 Capsules by mouth nightly as needed for Pain. Max Daily Amount: 300 mg. Indications: neuropathic pain    4. Skin tear of left lower leg without complication, initial encounter      EKG NSR, no change compared to 1/24/2020 - concern for ischemic heart disease - refer to cardiology for eval, to ER if symptoms worsen  Neuropathy improved but not controlled - increase gabapentin to 200mg hs for one week then may further increase to 300mg hs if needed after one week  Skin tear not infected, keep clean and dry, apply bacitracin and nonstick gauze    There are no preventive care reminders to display for this patient. Follow-up and Dispositions    · Return in about 3 months (around 9/6/2022), or if symptoms worsen or fail to improve, for neuropathy, sob, 30. Reviewed plan of care. Patient has provided input and agrees with goals. The nurse provided the patient and/or family with advanced directive information if needed and encouraged the patient to provide a copy to the office when available.

## 2022-06-06 NOTE — PROGRESS NOTES
Karin Dunn  Identified pt with two pt identifiers(name and ). Chief Complaint   Patient presents with    Peripheral Neuropathy     Room 2B //     GERD       Reviewed record In preparation for visit and have obtained necessary documentation. 1. Have you been to the ER, urgent care clinic or hospitalized since your last visit? No     2. Have you seen or consulted any other health care providers outside of the [address] [place] [address] since your last visit? Include any pap smears or colon screening. No    Patient does not have an advance directive. Vitals reviewed with provider. Health Maintenance reviewed: There are no preventive care reminders to display for this patient.      Wt Readings from Last 3 Encounters:   22 102 lb 9.6 oz (46.5 kg)   22 106 lb 12.8 oz (48.4 kg)   21 104 lb 12.8 oz (47.5 kg)      Temp Readings from Last 3 Encounters:   22 97.6 °F (36.4 °C) (Oral)   21 97.8 °F (36.6 °C) (Oral)   21 98.5 °F (36.9 °C) (Oral)      BP Readings from Last 3 Encounters:   22 121/68   21 128/64   21 133/74      Pulse Readings from Last 3 Encounters:   22 71   21 74   21 72      Vitals:    22 1003   Resp: 16   Weight: 102 lb 9.6 oz (46.5 kg)   Height: 5' 2\" (1.575 m)   PainSc:   0 - No pain          Learning Assessment:   :     Learning Assessment 2014   PRIMARY LEARNER Patient   HIGHEST LEVEL OF EDUCATION - PRIMARY LEARNER  DID NOT GRADUATE HIGH SCHOOL   BARRIERS PRIMARY LEARNER NONE   CO-LEARNER CAREGIVER No   PRIMARY LANGUAGE ENGLISH   LEARNER PREFERENCE PRIMARY DEMONSTRATION   ANSWERED BY patient   RELATIONSHIP SELF        Depression Screening:   :     3 most recent PHQ Screens 3/2/2022   Little interest or pleasure in doing things Not at all   Feeling down, depressed, irritable, or hopeless Not at all   Total Score PHQ 2 0        Fall Risk Assessment:   :     Fall Risk Assessment, last 12 mths 2021 Able to walk? Yes   Fall in past 12 months? 0   Do you feel unsteady? 0   Are you worried about falling 0   Number of falls in past 12 months -   Fall with injury? -        Abuse Screening:   :     Abuse Screening Questionnaire 1/23/2020 5/8/2019 11/7/2017 11/10/2016 6/6/2014   Do you ever feel afraid of your partner? N N N N N   Are you in a relationship with someone who physically or mentally threatens you? N N N N N   Is it safe for you to go home?  Y Y Y Y Y        ADL Screening:   :     ADL Assessment 11/7/2017   Feeding yourself No Help Needed   Getting from bed to chair No Help Needed   Getting dressed No Help Needed   Bathing or showering No Help Needed   Walk across the room (includes cane/walker) No Help Needed   Using the telphone No Help Needed   Taking your medications No Help Needed   Preparing meals No Help Needed   Managing money (expenses/bills) No Help Needed   Moderately strenuous housework (laundry) No Help Needed   Shopping for personal items (toiletries/medicines) No Help Needed   Shopping for groceries No Help Needed   Driving No Help Needed   Climbing a flight of stairs No Help Needed   Getting to places beyond walking distances No Help Needed

## 2022-06-21 ENCOUNTER — OFFICE VISIT (OUTPATIENT)
Dept: CARDIOLOGY CLINIC | Age: 87
End: 2022-06-21
Payer: MEDICARE

## 2022-06-21 VITALS
SYSTOLIC BLOOD PRESSURE: 110 MMHG | DIASTOLIC BLOOD PRESSURE: 60 MMHG | WEIGHT: 100 LBS | HEIGHT: 62 IN | HEART RATE: 74 BPM | BODY MASS INDEX: 18.4 KG/M2 | OXYGEN SATURATION: 99 % | RESPIRATION RATE: 18 BRPM

## 2022-06-21 DIAGNOSIS — R07.89 OTHER CHEST PAIN: Primary | ICD-10-CM

## 2022-06-21 DIAGNOSIS — Z82.49 FAMILY HISTORY OF EARLY CAD: ICD-10-CM

## 2022-06-21 DIAGNOSIS — M79.602 LEFT ARM PAIN: ICD-10-CM

## 2022-06-21 PROCEDURE — G8419 CALC BMI OUT NRM PARAM NOF/U: HCPCS | Performed by: INTERNAL MEDICINE

## 2022-06-21 PROCEDURE — 1101F PT FALLS ASSESS-DOCD LE1/YR: CPT | Performed by: INTERNAL MEDICINE

## 2022-06-21 PROCEDURE — G8510 SCR DEP NEG, NO PLAN REQD: HCPCS | Performed by: INTERNAL MEDICINE

## 2022-06-21 PROCEDURE — 1123F ACP DISCUSS/DSCN MKR DOCD: CPT | Performed by: INTERNAL MEDICINE

## 2022-06-21 PROCEDURE — 99214 OFFICE O/P EST MOD 30 MIN: CPT | Performed by: INTERNAL MEDICINE

## 2022-06-21 PROCEDURE — G8427 DOCREV CUR MEDS BY ELIG CLIN: HCPCS | Performed by: INTERNAL MEDICINE

## 2022-06-21 PROCEDURE — G8536 NO DOC ELDER MAL SCRN: HCPCS | Performed by: INTERNAL MEDICINE

## 2022-06-21 PROCEDURE — 1090F PRES/ABSN URINE INCON ASSESS: CPT | Performed by: INTERNAL MEDICINE

## 2022-06-21 NOTE — PROGRESS NOTES
[facility] [place]: [place]  [phone]    History of Present Illness:  Ms. Donnell Fuller is a 81 yo F with history of mixed hyperlipidemia, family history of early coronary artery disease, referred by Dr. Jaylen Fowler for cardiac evaluation. She is accompanied by her daughter. When she saw her primary care physician a few weeks ago, she was having some left arm and chest pain. This has resolved. When she was having it, she was feeling soreness that was \"constant\" with no clear exacerbating factors. She was also having occasional chest discomfort. Her daughter does note she was likely under some stressors at that time, as she cares for her handicap son. The last two weeks, she has been able to do walking that she usually does and gardening without any symptoms. She denies any prior cardiac history. No significant palpitations, lightheadedness or dizziness. She is compensated on exam with clear lungs and no lower extremity edema. Her EKG I reviewed personally from 06/06/2022 and this was normal sinus rhythm, nonspecific ST-T wave abnormality, heart rate of 72. Soc hx. No tobacco. 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 [facility]. Living with self and handicap son. Moving in to daughters house soon. Fam hx. Brother CABG, 62s, Mother CAD. Assessment and Plan:   1. Left arm/chest pain. No evidence of cardiac contribution and it sounds like it was more musculoskeletal or reflux, possibly related to stress. She has been able to do her normal activity these last two weeks without any associated symptoms. At least for now, can hold off on any cardiac testing. We did talk about if in the future this becomes exertional could always consider stress test or additional cardiac evaluation.       2. Mixed hyperlipidemia.           She  has a past medical history of Arthritis, GERD (gastroesophageal reflux disease), TIA (transient ischemic attack) (2006), Hypercholesterolemia, Osteoporosis, Other ill-defined conditions(799.89), and S/P colonoscopy (2001). All other systems negative except as above. PE  Vitals:    06/21/22 1016   BP: 110/60   Pulse: 74   Resp: 18   SpO2: 99%   Weight: 100 lb (45.4 kg)   Height: 5' 2\" (1.575 m)    Body mass index is 18.29 kg/m².    General appearance - alert, well appearing, and in no distress  Mental status - affect appropriate to mood  Eyes - sclera anicteric, moist mucous membranes  Neck - supple, no JVD  Chest - clear to auscultation, no wheezes, rales or rhonchi  Heart - normal rate, regular rhythm, normal S1, S2, no murmurs, rubs, clicks or gallops  Abdomen - soft, nontender, nondistended, no masses or organomegaly  Neurological - no focal deficit  Extremities - peripheral pulses normal, no pedal edema    Recent Labs:  Lab Results   Component Value Date/Time    Cholesterol, total 203 (H) 11/26/2021 10:07 AM    HDL Cholesterol 84 11/26/2021 10:07 AM    LDL, calculated 104 (H) 11/26/2021 10:07 AM    LDL, calculated 91 07/16/2020 09:58 AM    Triglyceride 82 11/26/2021 10:07 AM    CHOL/HDL Ratio 2.6 10/12/2010 08:57 AM     Lab Results   Component Value Date/Time    Creatinine 0.90 11/26/2021 10:07 AM     Lab Results   Component Value Date/Time    BUN 13 11/26/2021 10:07 AM     Lab Results   Component Value Date/Time    Potassium 4.6 11/26/2021 10:07 AM     No results found for: HBA1C, XPY9JGBQ, [id number]  Lab Results   Component Value Date/Time    HGB 12.9 07/16/2020 09:58 AM     Lab Results   Component Value Date/Time    PLATELET 229 72/33/5203 09:58 AM       Reviewed:  Past Medical History:   Diagnosis Date    Arthritis     GERD (gastroesophageal reflux disease)     Hx-TIA (transient ischemic attack) 2006    Hypercholesterolemia     Osteoporosis     Other ill-defined conditions(799.89)     high cholesterol    S/P colonoscopy 2001     Social History     Tobacco Use   Smoking Status Never Smoker   Smokeless Tobacco Never Used     Social History     Substance and Sexual Activity   Alcohol Use No     Allergies   Allergen Reactions    Lipitor [Atorvastatin] Myalgia    Pravastatin Myalgia    Zetia [Ezetimibe] Other (comments)     itching       Current Outpatient Medications   Medication Sig    gabapentin (NEURONTIN) 100 mg capsule Take 1-3 Capsules by mouth nightly as needed for Pain. Max Daily Amount: 300 mg. Indications: neuropathic pain    pantoprazole (PROTONIX) 40 mg tablet Take 1 Tablet by mouth daily.  pravastatin (PRAVACHOL) 20 mg tablet TAKE 1 TABLET EVERY DAY    cholecalciferol (VITAMIN D3) (2,000 UNITS /50 MCG) cap capsule Take 1 Capsule by mouth daily.  vit A/vit C/vit E/zinc/copper (PRESERVISION AREDS PO) Take  by mouth.  ACETAMINOPHEN (TYLENOL PO) Take  by mouth as needed.  furosemide (LASIX) 40 mg tablet Take 1 Tab by mouth daily as needed (swelling). (Patient not taking: Reported on 6/21/2022)     No current facility-administered medications for this visit.        Kunal Bejarano MD  [facility] [place]  [address] [facility]  [address]

## 2022-07-07 LAB
25(OH)D3+25(OH)D2 SERPL-MCNC: 54.9 NG/ML (ref 30–100)
ALBUMIN SERPL-MCNC: 4.6 G/DL (ref 3.5–4.6)
ALBUMIN/GLOB SERPL: 2 {RATIO} (ref 1.2–2.2)
ALP SERPL-CCNC: 57 IU/L (ref 44–121)
ALT SERPL-CCNC: 13 IU/L (ref 0–32)
AST SERPL-CCNC: 24 IU/L (ref 0–40)
BASOPHILS # BLD AUTO: 0 X10E3/UL (ref 0–0.2)
BASOPHILS NFR BLD AUTO: 1 %
BILIRUB SERPL-MCNC: 0.5 MG/DL (ref 0–1.2)
BUN SERPL-MCNC: 15 MG/DL (ref 10–36)
BUN/CREAT SERPL: 16 (ref 12–28)
CALCIUM SERPL-MCNC: 9.1 MG/DL (ref 8.7–10.3)
CHLORIDE SERPL-SCNC: 103 MMOL/L (ref 96–106)
CHOLEST SERPL-MCNC: 185 MG/DL (ref 100–199)
CO2 SERPL-SCNC: 24 MMOL/L (ref 20–29)
CREAT SERPL-MCNC: 0.94 MG/DL (ref 0.57–1)
EGFR: 58 ML/MIN/1.73
EOSINOPHIL # BLD AUTO: 0.1 X10E3/UL (ref 0–0.4)
EOSINOPHIL NFR BLD AUTO: 3 %
ERYTHROCYTE [DISTWIDTH] IN BLOOD BY AUTOMATED COUNT: 12.3 % (ref 11.7–15.4)
GLOBULIN SER CALC-MCNC: 2.3 G/DL (ref 1.5–4.5)
GLUCOSE SERPL-MCNC: 98 MG/DL (ref 65–99)
HCT VFR BLD AUTO: 39.5 % (ref 34–46.6)
HDLC SERPL-MCNC: 75 MG/DL
HGB BLD-MCNC: 12.7 G/DL (ref 11.1–15.9)
IMM GRANULOCYTES # BLD AUTO: 0 X10E3/UL (ref 0–0.1)
IMM GRANULOCYTES NFR BLD AUTO: 0 %
LDLC SERPL CALC-MCNC: 98 MG/DL (ref 0–99)
LYMPHOCYTES # BLD AUTO: 1.3 X10E3/UL (ref 0.7–3.1)
LYMPHOCYTES NFR BLD AUTO: 30 %
MCH RBC QN AUTO: 29.1 PG (ref 26.6–33)
MCHC RBC AUTO-ENTMCNC: 32.2 G/DL (ref 31.5–35.7)
MCV RBC AUTO: 91 FL (ref 79–97)
MONOCYTES # BLD AUTO: 0.4 X10E3/UL (ref 0.1–0.9)
MONOCYTES NFR BLD AUTO: 9 %
NEUTROPHILS # BLD AUTO: 2.6 X10E3/UL (ref 1.4–7)
NEUTROPHILS NFR BLD AUTO: 57 %
PLATELET # BLD AUTO: 173 X10E3/UL (ref 150–450)
POTASSIUM SERPL-SCNC: 5 MMOL/L (ref 3.5–5.2)
PROT SERPL-MCNC: 6.9 G/DL (ref 6–8.5)
RBC # BLD AUTO: 4.36 X10E6/UL (ref 3.77–5.28)
SODIUM SERPL-SCNC: 143 MMOL/L (ref 134–144)
T4 FREE SERPL-MCNC: 1.28 NG/DL (ref 0.82–1.77)
TRIGL SERPL-MCNC: 66 MG/DL (ref 0–149)
TSH SERPL DL<=0.005 MIU/L-ACNC: 3.22 UIU/ML (ref 0.45–4.5)
VIT B12 SERPL-MCNC: 423 PG/ML (ref 232–1245)
VLDLC SERPL CALC-MCNC: 12 MG/DL (ref 5–40)
WBC # BLD AUTO: 4.5 X10E3/UL (ref 3.4–10.8)

## 2022-08-02 ENCOUNTER — TELEPHONE (OUTPATIENT)
Dept: INTERNAL MEDICINE CLINIC | Age: 87
End: 2022-08-02

## 2022-08-02 NOTE — TELEPHONE ENCOUNTER
----- Message from Cjleda Richards sent at 8/2/2022  1:41 PM EDT -----  Subject: Results Request    QUESTIONS  Results: blood work ; Ordered by: Madonna Norris   Date Performed:   ---------------------------------------------------------------------------  --------------  [address]    [phone]; OK to leave message on voicemail  ---------------------------------------------------------------------------  --------------

## 2022-08-03 NOTE — TELEPHONE ENCOUNTER
I spoke with Amy Mendez (on [facility]) and verified the patient by name and date of birth. I informed her on the results from Dr. Jovanna Christian. She stated understanding and had no further questions.

## 2022-08-19 ENCOUNTER — NURSE TRIAGE (OUTPATIENT)
Dept: OTHER | Facility: CLINIC | Age: 87
End: 2022-08-19

## 2022-08-19 ENCOUNTER — TELEPHONE (OUTPATIENT)
Dept: INTERNAL MEDICINE CLINIC | Age: 87
End: 2022-08-19

## 2022-08-19 NOTE — TELEPHONE ENCOUNTER
Received call from Dhruv at [facility] with Red Flag Complaint. Current Symptoms: diarrhea - 3 episodes per day    Urinated last 30 minutes ago    Reports drinking water     Onset: 3 weeks ago;         Pain Severity: 0/10; Temperature: denies     What has been tried: nothing    Denies - vomiting / blood in stool / black or tarry / difficulty with urination / cracked and dry lips / dizziness        Recommended disposition: See PCP within 3 Days    Care advice provided, patient verbalizes understanding; denies any other questions or concerns; instructed to call back for any new or worsening symptoms. Patient/Caller agrees with recommended disposition; writer provided warm transfer to [facility] at [facility] for appointment scheduling    Attention Provider: Thank you for allowing me to participate in the care of your patient. The patient was connected to triage in response to information provided to the [facility]. Please do not respond through this encounter as the response is not directed to a shared pool.         Reason for Disposition   [1] MILD diarrhea (e.g., 1-3 or more stools than normal in past 24 hours) without known cause AND [2] present >  7 days    Protocols used: Diarrhea-ADULT-

## 2022-08-19 NOTE — TELEPHONE ENCOUNTER
Pt stated since receiving the 2nd Covid booster she has had diarrhea. Started with 1 - 2  stools a day but now 3 - 4 times a day. No blood or tarry stools, no n/v or fever. Advised to try some imodium OTC and eat a bland diet. Call back or UC if not better.

## 2022-08-25 DIAGNOSIS — E78.00 HYPERCHOLESTEROLEMIA: ICD-10-CM

## 2022-08-25 RX ORDER — PRAVASTATIN SODIUM 20 MG/1
TABLET ORAL
Qty: 90 TABLET | Refills: 3 | Status: SHIPPED | OUTPATIENT
Start: 2022-08-25

## 2022-09-14 ENCOUNTER — OFFICE VISIT (OUTPATIENT)
Dept: INTERNAL MEDICINE CLINIC | Age: 87
End: 2022-09-14
Payer: MEDICARE

## 2022-09-14 VITALS
OXYGEN SATURATION: 98 % | DIASTOLIC BLOOD PRESSURE: 75 MMHG | HEIGHT: 62 IN | WEIGHT: 98.6 LBS | RESPIRATION RATE: 14 BRPM | HEART RATE: 72 BPM | SYSTOLIC BLOOD PRESSURE: 129 MMHG | TEMPERATURE: 98.1 F | BODY MASS INDEX: 18.14 KG/M2

## 2022-09-14 DIAGNOSIS — K21.9 GASTROESOPHAGEAL REFLUX DISEASE WITHOUT ESOPHAGITIS: ICD-10-CM

## 2022-09-14 DIAGNOSIS — G62.9 NEUROPATHY: ICD-10-CM

## 2022-09-14 DIAGNOSIS — E55.9 VITAMIN D DEFICIENCY: ICD-10-CM

## 2022-09-14 DIAGNOSIS — E78.00 HYPERCHOLESTEROLEMIA: Primary | ICD-10-CM

## 2022-09-14 DIAGNOSIS — S61.309A AVULSION OF FINGERNAIL, INITIAL ENCOUNTER: ICD-10-CM

## 2022-09-14 PROCEDURE — G8427 DOCREV CUR MEDS BY ELIG CLIN: HCPCS | Performed by: INTERNAL MEDICINE

## 2022-09-14 PROCEDURE — G8536 NO DOC ELDER MAL SCRN: HCPCS | Performed by: INTERNAL MEDICINE

## 2022-09-14 PROCEDURE — G8432 DEP SCR NOT DOC, RNG: HCPCS | Performed by: INTERNAL MEDICINE

## 2022-09-14 PROCEDURE — 1090F PRES/ABSN URINE INCON ASSESS: CPT | Performed by: INTERNAL MEDICINE

## 2022-09-14 PROCEDURE — G8419 CALC BMI OUT NRM PARAM NOF/U: HCPCS | Performed by: INTERNAL MEDICINE

## 2022-09-14 PROCEDURE — 99214 OFFICE O/P EST MOD 30 MIN: CPT | Performed by: INTERNAL MEDICINE

## 2022-09-14 PROCEDURE — 1101F PT FALLS ASSESS-DOCD LE1/YR: CPT | Performed by: INTERNAL MEDICINE

## 2022-09-14 PROCEDURE — 1123F ACP DISCUSS/DSCN MKR DOCD: CPT | Performed by: INTERNAL MEDICINE

## 2022-09-14 RX ORDER — GLUCOSAMINE SULFATE 1500 MG
1000 POWDER IN PACKET (EA) ORAL DAILY
Qty: 90 CAPSULE | Refills: 3 | Status: SHIPPED | OUTPATIENT
Start: 2022-09-14

## 2022-09-14 NOTE — PROGRESS NOTES
Dagoberto Mcwilliams  Identified pt with two pt identifiers(name and ). Chief Complaint   Patient presents with    Neuropathy     Room 2A //     Shortness of Breath       Reviewed record In preparation for visit and have obtained necessary documentation. 1. Have you been to the ER, urgent care clinic or hospitalized since your last visit? No     2. Have you seen or consulted any other health care providers outside of the [address] [place] [address] since your last visit? Include any pap smears or colon screening. No    Patient has an advance directive. Vitals reviewed with provider.     Health Maintenance reviewed:     Health Maintenance Due   Topic    COVID-19 Vaccine (4 - Booster for Moderna series)    Flu Vaccine (1)          Wt Readings from Last 3 Encounters:   22 98 lb 9.6 oz (44.7 kg)   22 100 lb (45.4 kg)   22 102 lb 9.6 oz (46.5 kg)        Temp Readings from Last 3 Encounters:   22 97.9 °F (36.6 °C) (Oral)   22 97.6 °F (36.4 °C) (Oral)   21 97.8 °F (36.6 °C) (Oral)        BP Readings from Last 3 Encounters:   22 110/60   22 130/72   22 121/68        Pulse Readings from Last 3 Encounters:   22 74   22 72   22 71        Vitals:    22 1007   Resp: 14   Weight: 98 lb 9.6 oz (44.7 kg)   Height: 5' 2\" (1.575 m)   PainSc:   0 - No pain          Learning Assessment:   :       Learning Assessment 2014   PRIMARY LEARNER Patient   HIGHEST LEVEL OF EDUCATION - PRIMARY LEARNER  DID NOT GRADUATE HIGH SCHOOL   BARRIERS PRIMARY LEARNER NONE   CO-LEARNER CAREGIVER No   PRIMARY LANGUAGE ENGLISH   LEARNER PREFERENCE PRIMARY DEMONSTRATION   ANSWERED BY patient   RELATIONSHIP SELF        Depression Screening:   :       3 most recent PHQ Screens 2022   Little interest or pleasure in doing things Several days   Feeling down, depressed, irritable, or hopeless Several days   Total Score PHQ 2 2        Fall Risk Assessment:   :       Fall Risk Assessment, last 12 mths 6/21/2022   Able to walk? Yes   Fall in past 12 months? 0   Do you feel unsteady? 0   Are you worried about falling 0   Number of falls in past 12 months -   Fall with injury? -        Abuse Screening:   :       Abuse Screening Questionnaire 1/23/2020 5/8/2019 11/7/2017 11/10/2016 6/6/2014   Do you ever feel afraid of your partner? N N N N N   Are you in a relationship with someone who physically or mentally threatens you? N N N N N   Is it safe for you to go home?  Y Y Y Y Y        ADL Screening:   :       ADL Assessment 11/7/2017   Feeding yourself No Help Needed   Getting from bed to chair No Help Needed   Getting dressed No Help Needed   Bathing or showering No Help Needed   Walk across the room (includes cane/walker) No Help Needed   Using the telphone No Help Needed   Taking your medications No Help Needed   Preparing meals No Help Needed   Managing money (expenses/bills) No Help Needed   Moderately strenuous housework (laundry) No Help Needed   Shopping for personal items (toiletries/medicines) No Help Needed   Shopping for groceries No Help Needed   Driving No Help Needed   Climbing a flight of stairs No Help Needed   Getting to places beyond walking distances No Help Needed

## 2022-09-14 NOTE — PROGRESS NOTES
HPI  Ms. Agusto Aguila is a 80y.o. year old female, she is seen today for follow up neuropathy and SOB. Daughter has many questions:    Weight - has lost 2# - since last visit but was 98# (like today) - 7/2021 - not eating as much during week when cooking for herself  Dyspepsia - on pantoprazole, takes at night, helps - wondering if would work better if took at night - had bought of diarrhea last month, lasted several weeks after booster for covid - now resolved  Neuropathy - takes gabapentin 100mg hs and helps cramps in legs, burning feet, wakes her at night- took 200mg hs once and felt dizzy so went back to 100mg - advised trying 200mg again  Vitamin D - wondering if needs to take otc - last vitamin D was good - will start 1000IU daily  Injured left thumb nail, falling off - wonders what to do - patient says nail has fallen off about 5 x in past    Lately hasn't had any edema. Has been wearing compression stockings which has helped. Has fingernail which is coming off, wants to know what to do. No sob, no chest pain. Will get labs in 6 mos - see me in 3 mos  Chief Complaint   Patient presents with    Neuropathy     Room 2A //     Shortness of Breath        Prior to Admission medications    Medication Sig Start Date End Date Taking? Authorizing Provider   cholecalciferol (Vitamin D3) 25 mcg (1,000 unit) cap Take 1 Capsule by mouth daily. 9/14/22  Yes Berta Mckenzie MD   pravastatin (PRAVACHOL) 20 mg tablet TAKE 1 TABLET EVERY DAY 8/25/22  Yes Berta Mckenzie MD   gabapentin (NEURONTIN) 100 mg capsule Take 1-3 Capsules by mouth nightly as needed for Pain. Max Daily Amount: 300 mg. Indications: neuropathic pain 6/6/22  Yes Berta Mckenzie MD   pantoprazole (PROTONIX) 40 mg tablet Take 1 Tablet by mouth daily. 3/2/22  Yes Berta Mckenzei MD   vit A/vit C/vit E/zinc/copper (PRESERVISION AREDS PO) Take  by mouth. Yes Provider, Historical   ACETAMINOPHEN (TYLENOL PO) Take  by mouth as needed. Yes Provider, Historical   cholecalciferol (VITAMIN D3) (2,000 UNITS /50 MCG) cap capsule Take 1 Capsule by mouth daily. Patient not taking: Reported on 9/14/2022 7/26/21 9/14/22  Saleem Hansen MD   furosemide (LASIX) 40 mg tablet Take 1 Tab by mouth daily as needed (swelling). Patient not taking: Reported on 9/14/2022 9/3/20 9/14/22  Saleem Hansen MD         Allergies   Allergen Reactions    Lipitor [Atorvastatin] Myalgia    Pravastatin Myalgia    Zetia [Ezetimibe] Other (comments)     itching         REVIEW OF SYSTEMS:  Per HPI    PHYSICAL EXAM:  Visit Vitals  /75 (BP 1 Location: Left upper arm, BP Patient Position: Sitting, BP Cuff Size: Adult)   Pulse 72   Temp 98.1 °F (36.7 °C) (Oral)   Resp 14   Ht 5' 2\" (1.575 m)   Wt 98 lb 9.6 oz (44.7 kg)   SpO2 98%   BMI 18.03 kg/m²     Constitutional: Appears well-developed and well-nourished. No distress. HENT:   Head: Normocephalic and atraumatic. Eyes: No scleral icterus. Neck: no lad, no tm, supple   Cardiovascular: Normal S1/S2, regular rhythm. No murmurs, rubs, or gallops. Pulmonary/Chest: Effort normal and breath sounds normal. No respiratory distress. No wheezes, rhonchi, or rales. Abdomen: Soft, NT/ND, +BS, no rebound or guarding, no masses, no HSM appreciated. Left hand: left thumb nail loose, falling off but new growth evident  Ext: No edema. Neurological: Alert. Psychiatric: Normal mood and affect.  Behavior is normal.     Lab Results   Component Value Date/Time    Sodium 143 07/06/2022 09:16 AM    Potassium 5.0 07/06/2022 09:16 AM    Chloride 103 07/06/2022 09:16 AM    CO2 24 07/06/2022 09:16 AM    Anion gap 5 06/11/2019 03:02 AM    Glucose 98 07/06/2022 09:16 AM    BUN 15 07/06/2022 09:16 AM    Creatinine 0.94 07/06/2022 09:16 AM    BUN/Creatinine ratio 16 07/06/2022 09:16 AM    GFR est AA 66 11/26/2021 10:07 AM    GFR est non-AA 57 (L) 11/26/2021 10:07 AM    Calcium 9.1 07/06/2022 09:16 AM    Bilirubin, total 0.5 07/06/2022 09:16 AM    Alk. phosphatase 57 07/06/2022 09:16 AM    Protein, total 6.9 07/06/2022 09:16 AM    Albumin 4.6 07/06/2022 09:16 AM    Globulin 3.5 06/08/2019 05:52 PM    A-G Ratio 2.0 07/06/2022 09:16 AM    ALT (SGPT) 13 07/06/2022 09:16 AM     No results found for: HBA1C, AHY6RBDK, [id number]   Lab Results   Component Value Date/Time    Cholesterol, total 185 07/06/2022 09:16 AM    HDL Cholesterol 75 07/06/2022 09:16 AM    LDL, calculated 98 07/06/2022 09:16 AM    LDL, calculated 91 07/16/2020 09:58 AM    VLDL, calculated 12 07/06/2022 09:16 AM    VLDL, calculated 15 07/16/2020 09:58 AM    Triglyceride 66 07/06/2022 09:16 AM    CHOL/HDL Ratio 2.6 10/12/2010 08:57 AM          ASSESSMENT/PLAN  Diagnoses and all orders for this visit:    1. Hypercholesterolemia    2. Neuropathy    3. Vitamin D deficiency  -     cholecalciferol (Vitamin D3) 25 mcg (1,000 unit) cap; Take 1 Capsule by mouth daily. 4. Gastroesophageal reflux disease without esophagitis    5. Avulsion of fingernail, initial encounter    Lipids excellent continue pravastatin. Check labs in 6 months. Has had some weight loss but suspect this will improve when she moves in with her daughter permanently which will be next month. She tends to not eat as much when she cooks for herself. Her weight has been this low in the past several years. For nail injury she will trim it and keep the base covered with bandage until it falls off. Dyspepsia well controlled with Protonix, continue. For vitamin D deficiency take 1000 IUs daily. Neuropathy improved but still some discomfort, try increasing gabapentin to 200 mg again. She has low fluid intake and encouraged her to drink more fluids. Health Maintenance Due   Topic Date Due    COVID-19 Vaccine (4 - Booster for Moderna series) 03/04/2022        Follow-up and Dispositions    Return in about 3 months (around 12/14/2022) for weight, neuropathy. Reviewed plan of care.  Patient has provided input and agrees with goals. The nurse provided the patient and/or family with advanced directive information if needed and encouraged the patient to provide a copy to the office when available.

## 2022-11-14 ENCOUNTER — TELEPHONE (OUTPATIENT)
Dept: INTERNAL MEDICINE CLINIC | Age: 87
End: 2022-11-14

## 2022-11-14 DIAGNOSIS — R19.7 DIARRHEA, UNSPECIFIED TYPE: Primary | ICD-10-CM

## 2022-11-14 NOTE — TELEPHONE ENCOUNTER
Idalmis Perez, pt's daughter stated pt has had diarrhea for a while, comes and goes.  She would like the name of someone, saw Dr Ana Roberts in the past.

## 2022-12-07 ENCOUNTER — OFFICE VISIT (OUTPATIENT)
Dept: INTERNAL MEDICINE CLINIC | Age: 87
End: 2022-12-07
Payer: MEDICARE

## 2022-12-07 VITALS
TEMPERATURE: 98.5 F | SYSTOLIC BLOOD PRESSURE: 130 MMHG | RESPIRATION RATE: 12 BRPM | DIASTOLIC BLOOD PRESSURE: 70 MMHG | WEIGHT: 97.5 LBS | HEIGHT: 62 IN | BODY MASS INDEX: 17.94 KG/M2 | HEART RATE: 81 BPM | OXYGEN SATURATION: 98 %

## 2022-12-07 DIAGNOSIS — M19.049 HAND ARTHRITIS: ICD-10-CM

## 2022-12-07 DIAGNOSIS — M79.671 PAIN OF BOTH HEELS: ICD-10-CM

## 2022-12-07 DIAGNOSIS — L89.301 PRESSURE INJURY OF BUTTOCK, STAGE 1, UNSPECIFIED LATERALITY: ICD-10-CM

## 2022-12-07 DIAGNOSIS — S81.802A WOUND OF LEFT LOWER EXTREMITY, INITIAL ENCOUNTER: ICD-10-CM

## 2022-12-07 DIAGNOSIS — M79.672 PAIN OF BOTH HEELS: ICD-10-CM

## 2022-12-07 DIAGNOSIS — K52.9 CHRONIC DIARRHEA: ICD-10-CM

## 2022-12-07 DIAGNOSIS — M79.642 PAIN IN BOTH HANDS: ICD-10-CM

## 2022-12-07 DIAGNOSIS — R63.4 WEIGHT LOSS: ICD-10-CM

## 2022-12-07 DIAGNOSIS — R60.0 LOCALIZED EDEMA: Primary | ICD-10-CM

## 2022-12-07 DIAGNOSIS — M79.641 PAIN IN BOTH HANDS: ICD-10-CM

## 2022-12-07 PROCEDURE — 99214 OFFICE O/P EST MOD 30 MIN: CPT | Performed by: INTERNAL MEDICINE

## 2022-12-07 PROCEDURE — G8536 NO DOC ELDER MAL SCRN: HCPCS | Performed by: INTERNAL MEDICINE

## 2022-12-07 PROCEDURE — 1090F PRES/ABSN URINE INCON ASSESS: CPT | Performed by: INTERNAL MEDICINE

## 2022-12-07 PROCEDURE — G8419 CALC BMI OUT NRM PARAM NOF/U: HCPCS | Performed by: INTERNAL MEDICINE

## 2022-12-07 PROCEDURE — 1123F ACP DISCUSS/DSCN MKR DOCD: CPT | Performed by: INTERNAL MEDICINE

## 2022-12-07 PROCEDURE — G8427 DOCREV CUR MEDS BY ELIG CLIN: HCPCS | Performed by: INTERNAL MEDICINE

## 2022-12-07 PROCEDURE — 1101F PT FALLS ASSESS-DOCD LE1/YR: CPT | Performed by: INTERNAL MEDICINE

## 2022-12-07 PROCEDURE — G8432 DEP SCR NOT DOC, RNG: HCPCS | Performed by: INTERNAL MEDICINE

## 2022-12-07 NOTE — PROGRESS NOTES
Kelsy Rivers  Identified pt with two pt identifiers(name and ). Chief Complaint   Patient presents with    Weight Management     Room 2B    Neuropathy    Wrist Pain     right    Sore     Heel and buttocks       Reviewed record In preparation for visit and have obtained necessary documentation. 1. Have you been to the ER, urgent care clinic or hospitalized since your last visit? No     2. Have you seen or consulted any other health care providers outside of the [address] [place] [address] since your last visit? Include any pap smears or colon screening. No    Vitals reviewed with provider. Health Maintenance reviewed: There are no preventive care reminders to display for this patient.        Wt Readings from Last 3 Encounters:   22 97 lb 8 oz (44.2 kg)   22 98 lb 9.6 oz (44.7 kg)   22 100 lb (45.4 kg)        Temp Readings from Last 3 Encounters:   22 98.5 °F (36.9 °C) (Oral)   22 98.1 °F (36.7 °C) (Oral)   22 97.9 °F (36.6 °C) (Oral)        BP Readings from Last 3 Encounters:   22 (!) 145/77   22 129/75   22 110/60        Pulse Readings from Last 3 Encounters:   22 81   22 72   22 74        Vitals:    22 1018   BP: (!) 145/77   Pulse: 81   Resp: (!) 2   Temp: 98.5 °F (36.9 °C)   TempSrc: Oral   SpO2: 98%   Weight: 97 lb 8 oz (44.2 kg)   Height: 5' 2\" (1.575 m)   PainSc:   1   PainLoc: Hand          Learning Assessment:   :       Learning Assessment 2014   PRIMARY LEARNER Patient   HIGHEST LEVEL OF EDUCATION - PRIMARY LEARNER  DID NOT GRADUATE HIGH SCHOOL   BARRIERS PRIMARY LEARNER NONE   CO-LEARNER CAREGIVER No   PRIMARY LANGUAGE ENGLISH   LEARNER PREFERENCE PRIMARY DEMONSTRATION   ANSWERED BY patient   RELATIONSHIP SELF        Depression Screening:   :       3 most recent PHQ Screens 2022   Little interest or pleasure in doing things Several days   Feeling down, depressed, irritable, or hopeless Several days Total Score PHQ 2 2        Fall Risk Assessment:   :       Fall Risk Assessment, last 12 mths 6/21/2022   Able to walk? Yes   Fall in past 12 months? 0   Do you feel unsteady? 0   Are you worried about falling 0   Number of falls in past 12 months -   Fall with injury? -        Abuse Screening:   :       Abuse Screening Questionnaire 1/23/2020 5/8/2019 11/7/2017 11/10/2016 6/6/2014   Do you ever feel afraid of your partner? N N N N N   Are you in a relationship with someone who physically or mentally threatens you? N N N N N   Is it safe for you to go home?  Y Y Y Y Y        ADL Screening:   :       ADL Assessment 11/7/2017   Feeding yourself No Help Needed   Getting from bed to chair No Help Needed   Getting dressed No Help Needed   Bathing or showering No Help Needed   Walk across the room (includes cane/walker) No Help Needed   Using the telphone No Help Needed   Taking your medications No Help Needed   Preparing meals No Help Needed   Managing money (expenses/bills) No Help Needed   Moderately strenuous housework (laundry) No Help Needed   Shopping for personal items (toiletries/medicines) No Help Needed   Shopping for groceries No Help Needed   Driving No Help Needed   Climbing a flight of stairs No Help Needed   Getting to places beyond walking distances No Help Needed

## 2022-12-07 NOTE — PROGRESS NOTES
HPI  Ms. Gallego Grandalejandra is a [address] year old female, she is seen today for follow up weight, neuropathy. Has appt with GI for frequent spells of diarrhea. Had h/o spells having 1-2 loose stools per day for months at a time - now improved. No blood in stool. Right hand and wrist were painful and swollen suddenly while watching television. Fingers were outstretched and says she couldn't move them but hand/arm wasn't limp. Had difficulty holding coffee cup at first due to pain. Eats smaller portions. Has sore areas on ankles and buttocks - can't turn on side since hip surgery - and feels discomfort when lying flat on back. No chest pain or sob. Also sore on left leg-seen by podiatrist and cleaned. Appears to be healing. Chief Complaint   Patient presents with    Weight Management     Room 2B    Neuropathy    Wrist Pain     right    Sore     Heel and buttocks        Prior to Admission medications    Medication Sig Start Date End Date Taking? Authorizing Provider   cholecalciferol (Vitamin D3) 25 mcg (1,000 unit) cap Take 1 Capsule by mouth daily. 9/14/22  Yes Becky Canales MD   pravastatin (PRAVACHOL) 20 mg tablet TAKE 1 TABLET EVERY DAY 8/25/22  Yes Becky Canales MD   gabapentin (NEURONTIN) 100 mg capsule Take 1-3 Capsules by mouth nightly as needed for Pain. Max Daily Amount: 300 mg. Indications: neuropathic pain 6/6/22  Yes Becky Canales MD   pantoprazole (PROTONIX) 40 mg tablet Take 1 Tablet by mouth daily. 3/2/22  Yes Becky Canales MD   vit A/vit C/vit E/zinc/copper (PRESERVISION AREDS PO) Take  by mouth. Yes Provider, Historical   ACETAMINOPHEN (TYLENOL PO) Take  by mouth as needed.    Yes Provider, Historical         Allergies   Allergen Reactions    Lipitor [Atorvastatin] Myalgia    Pravastatin Myalgia    Zetia [Ezetimibe] Other (comments)     itching         REVIEW OF SYSTEMS:  Per HPI    PHYSICAL EXAM:  Visit Vitals  /70   Pulse 81   Temp 98.5 °F (36.9 °C) (Oral)   Resp 12   Ht 5' 2\" (1.575 m)   Wt 97 lb 8 oz (44.2 kg)   SpO2 98%   BMI 17.83 kg/m²     Constitutional: Appears frail, in NAD. HENT:   Head: Normocephalic and atraumatic. Eyes: No scleral icterus. Cardiovascular: Normal S1/S2, regular rhythm. No murmurs, rubs, or gallops. Pulmonary/Chest: Effort normal and breath sounds normal. No respiratory distress. No wheezes, rhonchi, or rales. Abdomen: Soft, NT/ND, +BS, no rebound or guarding, no masses, no HSM appreciated. Ext: No edema RLE, trace LLE  Skin: No wounds on heels or ankles. 2 small abrasions with scabs left lower lateral leg without evidence of infection or tenderness. Stage I sacral pressure injury with scaling between upper buttocks. MSK: mild swelling base of right thumb, no pain, +bony enlargement all joints both hands  Neurological: Alert. Strength 5 out of 5 bilateral upper extremities. Psychiatric: Normal mood and affect. Behavior is normal.     Lab Results   Component Value Date/Time    Sodium 143 07/06/2022 09:16 AM    Potassium 5.0 07/06/2022 09:16 AM    Chloride 103 07/06/2022 09:16 AM    CO2 24 07/06/2022 09:16 AM    Anion gap 5 06/11/2019 03:02 AM    Glucose 98 07/06/2022 09:16 AM    BUN 15 07/06/2022 09:16 AM    Creatinine 0.94 07/06/2022 09:16 AM    BUN/Creatinine ratio 16 07/06/2022 09:16 AM    GFR est AA 66 11/26/2021 10:07 AM    GFR est non-AA 57 (L) 11/26/2021 10:07 AM    Calcium 9.1 07/06/2022 09:16 AM    Bilirubin, total 0.5 07/06/2022 09:16 AM    Alk.  phosphatase 57 07/06/2022 09:16 AM    Protein, total 6.9 07/06/2022 09:16 AM    Albumin 4.6 07/06/2022 09:16 AM    Globulin 3.5 06/08/2019 05:52 PM    A-G Ratio 2.0 07/06/2022 09:16 AM    ALT (SGPT) 13 07/06/2022 09:16 AM     No results found for: HBA1C, ARS8SNZK, FUK3ILEI   Lab Results   Component Value Date/Time    Cholesterol, total 185 07/06/2022 09:16 AM    HDL Cholesterol 75 07/06/2022 09:16 AM    LDL, calculated 98 07/06/2022 09:16 AM    LDL, calculated 91 07/16/2020 09:58 AM    VLDL, calculated 12 07/06/2022 09:16 AM    VLDL, calculated 15 07/16/2020 09:58 AM    Triglyceride 66 07/06/2022 09:16 AM    CHOL/HDL Ratio 2.6 10/12/2010 08:57 AM          ASSESSMENT/PLAN  Diagnoses and all orders for this visit:    1. Localized edema    2. Weight loss    3. Pain of both heels    4. Pressure injury of buttock, stage 1, unspecified laterality    5. Hand arthritis  -     REFERRAL TO PHYSICAL THERAPY    6. Pain in both hands  -     REFERRAL TO PHYSICAL THERAPY    7. Chronic diarrhea    8. Wound of left lower extremity, initial encounter      Unclear etiology to what patient describes as sudden onset swelling right hand. Do not suspect CVA. Suspect due to tendon or arthritis issue. Will refer to physical therapy as she has chronic pain in both hands. Apply zinc topical to buttocks and consider eggcrate mattress. Float heels at night to avoid pressure injury. Wound on left lower leg is healing. Monitor weight loss. Suspect due to aging and eating less. She will see GI for chronic diarrhea, currently nonissue. There are no preventive care reminders to display for this patient. Follow-up and Dispositions    Return in about 3 months (around 3/7/2023) for chol, weight, AWV, 30 MIN APPT. Reviewed plan of care. Patient has provided input and agrees with goals. The nurse provided the patient and/or family with advanced directive information if needed and encouraged the patient to provide a copy to the office when available.

## 2022-12-22 DIAGNOSIS — G62.9 NEUROPATHY: ICD-10-CM

## 2022-12-22 RX ORDER — GABAPENTIN 100 MG/1
100-300 CAPSULE ORAL
Qty: 270 CAPSULE | Refills: 1 | Status: SHIPPED | OUTPATIENT
Start: 2022-12-22

## 2022-12-22 NOTE — TELEPHONE ENCOUNTER
PCP: Delores Roman MD     Last appt: 12/7/2022     Future Appointments   Date Time Provider Floyd Haywood   3/8/2023 11:00 AM Delores Roman MD [facility] AMB          Requested Prescriptions     Pending Prescriptions Disp Refills    gabapentin (NEURONTIN) 100 mg capsule 270 Capsule 1     Sig: Take 1-3 Capsules by mouth nightly as needed for Pain. Max Daily Amount: 300 mg.  Indications: neuropathic pain

## 2022-12-22 NOTE — TELEPHONE ENCOUNTER
Requested Prescriptions     Pending Prescriptions Disp Refills    gabapentin (NEURONTIN) 100 mg capsule 270 Capsule 1     Sig: Take 1-3 Capsules by mouth nightly as needed for Pain. Max Daily Amount: 300 mg.  Indications: neuropathic pain

## 2023-01-25 DIAGNOSIS — K21.9 GASTROESOPHAGEAL REFLUX DISEASE WITHOUT ESOPHAGITIS: ICD-10-CM

## 2023-01-26 RX ORDER — PANTOPRAZOLE SODIUM 40 MG/1
TABLET, DELAYED RELEASE ORAL
Qty: 90 TABLET | Refills: 2 | Status: SHIPPED | OUTPATIENT
Start: 2023-01-26

## 2023-01-30 DIAGNOSIS — E78.00 HYPERCHOLESTEROLEMIA: ICD-10-CM

## 2023-01-30 DIAGNOSIS — G62.9 NEUROPATHY: ICD-10-CM

## 2023-01-30 DIAGNOSIS — E55.9 VITAMIN D DEFICIENCY: ICD-10-CM

## 2023-01-30 RX ORDER — GLUCOSAMINE SULFATE 1500 MG
1000 POWDER IN PACKET (EA) ORAL DAILY
Qty: 90 CAPSULE | Refills: 3 | Status: SHIPPED | OUTPATIENT
Start: 2023-01-30

## 2023-01-30 RX ORDER — GABAPENTIN 100 MG/1
100-300 CAPSULE ORAL
Qty: 270 CAPSULE | Refills: 1 | Status: SHIPPED | OUTPATIENT
Start: 2023-01-30

## 2023-01-30 NOTE — TELEPHONE ENCOUNTER
Pt daughter is calling to get her mothers most recent blood work and labs. She said it can be mailed.      Address: [address] [place] [address] [address] [id number]     Daughter has her mother living with her

## 2023-01-30 NOTE — TELEPHONE ENCOUNTER
PCP: Chrissy Smith MD     Last appt: 12/7/2022     Future Appointments   Date Time Provider Floyd Haywood   3/8/2023 11:00 AM Chrissy Smith MD [facility]          Requested Prescriptions     Pending Prescriptions Disp Refills    gabapentin (NEURONTIN) 100 mg capsule 270 Capsule 1     Sig: Take 1-3 Capsules by mouth nightly as needed for Pain. Max Daily Amount: 300 mg. Indications: neuropathic pain    cholecalciferol (Vitamin D3) 25 mcg (1,000 unit) cap 90 Capsule 3     Sig: Take 1 Capsule by mouth daily.

## 2023-02-06 ENCOUNTER — TELEPHONE (OUTPATIENT)
Dept: INTERNAL MEDICINE CLINIC | Age: 88
End: 2023-02-06

## 2023-02-06 NOTE — TELEPHONE ENCOUNTER
[facility] office, [facility] is calling requesting the most recent labs/notes from the pt.      FAX: [phone]  Phone: [phone]

## 2023-03-08 ENCOUNTER — OFFICE VISIT (OUTPATIENT)
Dept: INTERNAL MEDICINE CLINIC | Age: 88
End: 2023-03-08
Payer: MEDICARE

## 2023-03-08 VITALS
SYSTOLIC BLOOD PRESSURE: 120 MMHG | TEMPERATURE: 98.4 F | RESPIRATION RATE: 12 BRPM | DIASTOLIC BLOOD PRESSURE: 70 MMHG | BODY MASS INDEX: 18.5 KG/M2 | HEIGHT: 62 IN | OXYGEN SATURATION: 98 % | WEIGHT: 100.5 LBS | HEART RATE: 76 BPM

## 2023-03-08 DIAGNOSIS — E78.00 HYPERCHOLESTEROLEMIA: ICD-10-CM

## 2023-03-08 DIAGNOSIS — K59.00 CONSTIPATION, UNSPECIFIED CONSTIPATION TYPE: ICD-10-CM

## 2023-03-08 DIAGNOSIS — E55.9 VITAMIN D DEFICIENCY: ICD-10-CM

## 2023-03-08 DIAGNOSIS — G62.9 NEUROPATHY: ICD-10-CM

## 2023-03-08 DIAGNOSIS — Z00.00 MEDICARE ANNUAL WELLNESS VISIT, SUBSEQUENT: Primary | ICD-10-CM

## 2023-03-08 PROCEDURE — 1101F PT FALLS ASSESS-DOCD LE1/YR: CPT | Performed by: INTERNAL MEDICINE

## 2023-03-08 PROCEDURE — 1123F ACP DISCUSS/DSCN MKR DOCD: CPT | Performed by: INTERNAL MEDICINE

## 2023-03-08 PROCEDURE — G8536 NO DOC ELDER MAL SCRN: HCPCS | Performed by: INTERNAL MEDICINE

## 2023-03-08 PROCEDURE — G0439 PPPS, SUBSEQ VISIT: HCPCS | Performed by: INTERNAL MEDICINE

## 2023-03-08 PROCEDURE — 1090F PRES/ABSN URINE INCON ASSESS: CPT | Performed by: INTERNAL MEDICINE

## 2023-03-08 PROCEDURE — G8510 SCR DEP NEG, NO PLAN REQD: HCPCS | Performed by: INTERNAL MEDICINE

## 2023-03-08 PROCEDURE — G8427 DOCREV CUR MEDS BY ELIG CLIN: HCPCS | Performed by: INTERNAL MEDICINE

## 2023-03-08 PROCEDURE — 99213 OFFICE O/P EST LOW 20 MIN: CPT | Performed by: INTERNAL MEDICINE

## 2023-03-08 PROCEDURE — G8419 CALC BMI OUT NRM PARAM NOF/U: HCPCS | Performed by: INTERNAL MEDICINE

## 2023-03-08 RX ORDER — POLYETHYLENE GLYCOL 3350 17 G/17G
17 POWDER, FOR SOLUTION ORAL
COMMUNITY

## 2023-03-08 NOTE — PATIENT INSTRUCTIONS
Medicare Wellness Visit, Female     The best way to live healthy is to have a lifestyle where you eat a well-balanced diet, exercise regularly, limit alcohol use, and quit all forms of tobacco/nicotine, if applicable. Regular preventive services are another way to keep healthy. Preventive services (vaccines, screening tests, monitoring & exams) can help personalize your care plan, which helps you manage your own care. Screening tests can find health problems at the earliest stages, when they are easiest to treat. [facility] follows the current, evidence-based guidelines published by the [facility] Emiliano Connor ([facility]) when recommending preventive services for our patients. Because we follow these guidelines, sometimes recommendations change over time as research supports it. (For example, mammograms used to be recommended annually. Even though Medicare will still pay for an annual mammogram, the newer guidelines recommend a mammogram every two years for women of average risk). Of course, you and your doctor may decide to screen more often for some diseases, based on your risk and your co-morbidities (chronic disease you are already diagnosed with). Preventive services for you include:  - Medicare offers their members a free annual wellness visit, which is time for you and your primary care provider to discuss and plan for your preventive service needs.  Take advantage of this benefit every year!    -Over the age of 72 should receive the recommended pneumonia vaccines.    -All adults should have a flu vaccine yearly.  -All adults should have a tetanus vaccine every 10 years.   -Over the age 48 should receive the shingles vaccines.        -All adults should be screened once for Hepatitis C.  -All adults age 38-68 who are overweight should have a diabetes screening test once every three years.   -Other screening tests and preventive services for persons with diabetes include: an eye exam to screen for diabetic retinopathy, a kidney function test, a foot exam, and stricter control over your cholesterol.   -Cardiovascular screening for adults with routine risk involves an electrocardiogram (ECG) at intervals determined by your doctor.     -Colorectal cancer screenings should be done for adults age 39-70 with no increased risk factors for colorectal cancer. There are a number of acceptable methods of screening for this type of cancer. Each test has its own benefits and drawbacks. Discuss with your doctor what is most appropriate for you during your annual wellness visit. The different tests include: colonoscopy (considered the best screening method), a fecal occult blood test, a fecal DNA test, and sigmoidoscopy.    -Lung cancer screening is recommended annually with a low dose CT scan for adults between age 54 and 68, who have smoked at least 30 pack years (equivalent of 1 pack per day for 30 days), and who is a current smoker or quit less than 15 years ago.    -A bone mass density test is recommended when a woman turns 65 to screen for osteoporosis. This test is only recommended one time, as a screening. Some providers will use this same test as a disease monitoring tool if you already have osteoporosis. -Breast cancer screenings are recommended every other year for women of normal risk, age 54-69.    -Cervical cancer screenings for women over age 72 are only recommended with certain risk factors.      Here is a list of your current Health Maintenance items (your personalized list of preventive services) with a due date:  Health Maintenance Due   Topic Date Due    Shingles Vaccine (1 of 2) Never done

## 2023-03-08 NOTE — PROGRESS NOTES
Erick Mech  Identified pt with two pt identifiers(name and ). Chief Complaint   Patient presents with    Annual Wellness Visit    Cholesterol Problem    Weight Management       Reviewed record In preparation for visit and have obtained necessary documentation. 1. Have you been to the ER, urgent care clinic or hospitalized since your last visit? No     2. Have you seen or consulted any other health care providers outside of the [address] [place] [address] since your last visit? Include any pap smears or colon screening. GI, gyn    Vitals reviewed with provider.     Health Maintenance reviewed:     Health Maintenance Due   Topic    Shingles Vaccine (1 of 2)    Medicare Yearly Exam           Wt Readings from Last 3 Encounters:   23 100 lb 8 oz (45.6 kg)   22 97 lb 8 oz (44.2 kg)   22 98 lb 9.6 oz (44.7 kg)        Temp Readings from Last 3 Encounters:   23 98.4 °F (36.9 °C) (Oral)   22 98.5 °F (36.9 °C) (Oral)   22 98.1 °F (36.7 °C) (Oral)        BP Readings from Last 3 Encounters:   23 (!) 147/75   22 130/70   22 129/75        Pulse Readings from Last 3 Encounters:   23 76   22 81   22 72        Vitals:    23 1110   BP: (!) 147/75   Pulse: 76   Resp: 12   Temp: 98.4 °F (36.9 °C)   TempSrc: Oral   SpO2: 98%   Weight: 100 lb 8 oz (45.6 kg)   Height: 5' 2\" (1.575 m)   PainSc:   0 - No pain          Learning Assessment:   :       Learning Assessment 2014   PRIMARY LEARNER Patient   HIGHEST LEVEL OF EDUCATION - PRIMARY LEARNER  DID NOT GRADUATE HIGH SCHOOL   BARRIERS PRIMARY LEARNER NONE   CO-LEARNER CAREGIVER No   PRIMARY LANGUAGE ENGLISH   LEARNER PREFERENCE PRIMARY DEMONSTRATION   ANSWERED BY patient   RELATIONSHIP SELF        Depression Screening:   :       3 most recent PHQ Screens 3/8/2023   Little interest or pleasure in doing things Not at all   Feeling down, depressed, irritable, or hopeless Several days   Total Score PHQ 2 1        Fall Risk Assessment:   :       Fall Risk Assessment, last 12 mths 3/8/2023   Able to walk? Yes   Fall in past 12 months? 0   Do you feel unsteady? 0   Are you worried about falling 0   Number of falls in past 12 months -   Fall with injury? -        Abuse Screening:   :       Abuse Screening Questionnaire 3/8/2023 1/23/2020 5/8/2019 11/7/2017 11/10/2016 6/6/2014   Do you ever feel afraid of your partner? N N N N N N   Are you in a relationship with someone who physically or mentally threatens you? N N N N N N   Is it safe for you to go home?  Y Y Y Y Y Y        ADL Screening:   :       ADL Assessment 3/8/2023   Feeding yourself No Help Needed   Getting from bed to chair No Help Needed   Getting dressed No Help Needed   Bathing or showering No Help Needed   Walk across the room (includes cane/walker) No Help Needed   Using the telphone No Help Needed   Taking your medications No Help Needed   Preparing meals No Help Needed   Managing money (expenses/bills) No Help Needed   Moderately strenuous housework (laundry) No Help Needed   Shopping for personal items (toiletries/medicines) No Help Needed   Shopping for groceries No Help Needed   Driving No Help Needed   Climbing a flight of stairs No Help Needed   Getting to places beyond walking distances No Help Needed

## 2023-03-08 NOTE — PROGRESS NOTES
HPI  Ms. Suarez [facility] is a 80y.o. year old female, she is seen today for AWV, cholesterol, weight. Plan last visit about 3 mos ago for multiple issues:  Unclear etiology to what patient describes as sudden onset swelling right hand. Do not suspect CVA. Suspect due to tendon or arthritis issue. Will refer to physical therapy as she has chronic pain in both hands. Apply zinc topical to buttocks and consider eggcrate mattress. Float heels at night to avoid pressure injury. Wound on left lower leg is healing. Monitor weight loss. Suspect due to aging and eating less. She will see GI for chronic diarrhea, currently nonissue. No issues with her hand swelling since last visit. Has seen GI and has had CT showing lots of stool, after taking miralax daily for 2 weeks no change on films so added miralax in morning and metamucil at night. Eating better when she is at daughter's house. Lives between her house and her daughter's house. Feels she is having better bowel movements. Also saw gyn for what sounds like pelvic prolapse with urinary leakage but not severe and no treatment needed yet. Has gained 3# since last visit. Neuropathic pain in feet will often keep her from sleeping, taking gabapentin 200mg hs and hasn't tried 300mg yet. Chief Complaint   Patient presents with    Annual Wellness Visit    Cholesterol Problem    Weight Management        Prior to Admission medications    Medication Sig Start Date End Date Taking? Authorizing Provider   polyethylene glycol (Miralax) 17 gram/dose powder Take 17 g by mouth. Every morning   Yes Provider, Historical   psyllium (METAMUCIL) pack (sugar free) packet Take 1 Packet by mouth nightly. Yes Provider, Historical   gabapentin (NEURONTIN) 100 mg capsule Take 1-3 Capsules by mouth nightly as needed for Pain. Max Daily Amount: 300 mg.  Indications: neuropathic pain 1/30/23  Yes Justino Joyce MD   cholecalciferol (Vitamin D3) 25 mcg (1,000 unit) cap Take 1 Capsule by mouth daily. 1/30/23  Yes Augustin Nolan MD   pantoprazole (PROTONIX) 40 mg tablet TAKE 1 TABLET EVERY DAY 1/26/23  Yes Augustin Nolan MD   pravastatin (PRAVACHOL) 20 mg tablet TAKE 1 TABLET EVERY DAY 8/25/22  Yes Augustin Nolan MD   vit A/vit C/vit E/zinc/copper (PRESERVISION AREDS PO) Take  by mouth. Yes Provider, Historical   ACETAMINOPHEN (TYLENOL PO) Take  by mouth as needed. Yes Provider, Historical         Allergies   Allergen Reactions    Lipitor [Atorvastatin] Myalgia    Pravastatin Myalgia    Zetia [Ezetimibe] Other (comments)     itching         REVIEW OF SYSTEMS:  Per HPI    PHYSICAL EXAM:  Visit Vitals  /70   Pulse 76   Temp 98.4 °F (36.9 °C) (Oral)   Resp 12   Ht 5' 2\" (1.575 m)   Wt 100 lb 8 oz (45.6 kg)   SpO2 98%   BMI 18.38 kg/m²     Constitutional: Appears well-developed and well-nourished. No distress. HENT:   Head: Normocephalic and atraumatic. Eyes: No scleral icterus. Cardiovascular: Normal S1/S2, regular rhythm. No murmurs, rubs, or gallops. Pulmonary/Chest: Effort normal and breath sounds normal. No respiratory distress. No wheezes, rhonchi, or rales. Ext: No edema. Neurological: Alert. Psychiatric: Normal mood and affect. Behavior is normal.     Lab Results   Component Value Date/Time    Sodium 143 07/06/2022 09:16 AM    Potassium 5.0 07/06/2022 09:16 AM    Chloride 103 07/06/2022 09:16 AM    CO2 24 07/06/2022 09:16 AM    Anion gap 5 06/11/2019 03:02 AM    Glucose 98 07/06/2022 09:16 AM    BUN 15 07/06/2022 09:16 AM    Creatinine 0.94 07/06/2022 09:16 AM    BUN/Creatinine ratio 16 07/06/2022 09:16 AM    GFR est AA 66 11/26/2021 10:07 AM    GFR est non-AA 57 (L) 11/26/2021 10:07 AM    Calcium 9.1 07/06/2022 09:16 AM    Bilirubin, total 0.5 07/06/2022 09:16 AM    Alk.  phosphatase 57 07/06/2022 09:16 AM    Protein, total 6.9 07/06/2022 09:16 AM    Albumin 4.6 07/06/2022 09:16 AM    Globulin 3.5 06/08/2019 05:52 PM    A-G Ratio 2.0 07/06/2022 09:16 AM    ALT (SGPT) 13 07/06/2022 09:16 AM     No results found for: HBA1C, NFL6MISB, SZG3BAAU   Lab Results   Component Value Date/Time    Cholesterol, total 185 07/06/2022 09:16 AM    HDL Cholesterol 75 07/06/2022 09:16 AM    LDL, calculated 98 07/06/2022 09:16 AM    LDL, calculated 91 07/16/2020 09:58 AM    VLDL, calculated 12 07/06/2022 09:16 AM    VLDL, calculated 15 07/16/2020 09:58 AM    Triglyceride 66 07/06/2022 09:16 AM    CHOL/HDL Ratio 2.6 10/12/2010 08:57 AM          ASSESSMENT/PLAN  Diagnoses and all orders for this visit:    1. Medicare annual wellness visit, subsequent    2. Hypercholesterolemia  -     METABOLIC PANEL, COMPREHENSIVE; Future  -     LIPID PANEL; Future    3. Vitamin D deficiency  -     VITAMIN D, 25 HYDROXY; Future    4. Neuropathy  -     CBC WITH AUTOMATED DIFF; Future    5. Constipation, unspecified constipation type    Neuropathy initially improved with gabapentin, now more issues with pain at night and sleeping - increase to 300mg hs  Has gained 2# - on bowel regimen to prevent constipation and has follow up with GI  Check labs prior to next visit    Health Maintenance Due   Topic Date Due    Shingles Vaccine (1 of 2) Never done        Follow-up and Dispositions    Return in about 3 months (around 6/8/2023) for neuropathy, 30 MIN APPT. Reviewed plan of care. Patient has provided input and agrees with goals. The nurse provided the patient and/or family with advanced directive information if needed and encouraged the patient to provide a copy to the office when available. This is the Subsequent Medicare Annual Wellness Exam, performed 12 months or more after the Initial AWV or the last Subsequent AWV    I have reviewed the patient's medical history in detail and updated the computerized patient record. Assessment/Plan   Education and counseling provided:  Are appropriate based on today's review and evaluation    1.  Medicare annual wellness visit, subsequent  2. Hypercholesterolemia  -     METABOLIC PANEL, COMPREHENSIVE; Future  -     LIPID PANEL; Future  3. Vitamin D deficiency  -     VITAMIN D, 25 HYDROXY; Future  4. Neuropathy  -     CBC WITH AUTOMATED DIFF; Future  5. Constipation, unspecified constipation type     Depression Risk Factor Screening     3 most recent PHQ Screens 3/8/2023   Little interest or pleasure in doing things Not at all   Feeling down, depressed, irritable, or hopeless Several days   Total Score PHQ 2 1       Alcohol & Drug Abuse Risk Screen    Do you average more than 1 drink per night or more than 7 drinks a week:  No    On any one occasion in the past three months have you have had more than 3 drinks containing alcohol:  No          Functional Ability and Level of Safety    Hearing: The patient wears hearing aids. Activities of Daily Living: The home contains: grab bars and shower chair  Patient does total self care      Ambulation: with no difficulty     Fall Risk:  Fall Risk Assessment, last 12 mths 3/8/2023   Able to walk? Yes   Fall in past 12 months? 0   Do you feel unsteady? 0   Are you worried about falling 0   Number of falls in past 12 months -   Fall with injury?  -      Abuse Screen:  Patient is not abused       Cognitive Screening    Has your family/caregiver stated any concerns about your memory: no     Cognitive Screening: n/a    Health Maintenance Due     Health Maintenance Due   Topic Date Due    Shingles Vaccine (1 of 2) Never done       Patient Care Team   Patient Care Team:  Diana Anguiano MD as PCP - General (Internal Medicine Physician)  Diana Anguiano MD as PCP [facility] Empaneled Provider    History     Patient Active Problem List   Diagnosis Code    Hypercholesterolemia E78.00    Hx-TIA (transient ischemic attack) Z86.73    History of colonoscopy Z98.890    Chest pain R07.9    Presbyacusia H91.10    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 08XD    Caregiver stress Z63.6    Wears hearing aid Z97.4    At high risk for falls Z91.81    Localized edema R60.0     Past Medical History:   Diagnosis Date    Arthritis     GERD (gastroesophageal reflux disease)     Hx-TIA (transient ischemic attack) 2006    Hypercholesterolemia     Osteoporosis     Other ill-defined conditions(799.89)     high cholesterol    S/P colonoscopy 2001      Past Surgical History:   Procedure Laterality Date    HX ORTHOPAEDIC  2010    left hip replacement    RI COLONOSCOPY FLX DX W/COLLJ SPEC WHEN PFRMD  5/25/2012         STRESS TEST CARDIAC  2007    UPPER GI ENDOSCOPY,BIOPSY  6/10/2019          Current Outpatient Medications   Medication Sig Dispense Refill    polyethylene glycol (Miralax) 17 gram/dose powder Take 17 g by mouth. Every morning      psyllium (METAMUCIL) pack (sugar free) packet Take 1 Packet by mouth nightly.      gabapentin (NEURONTIN) 100 mg capsule Take 1-3 Capsules by mouth nightly as needed for Pain. Max Daily Amount: 300 mg. Indications: neuropathic pain 270 Capsule 1    cholecalciferol (Vitamin D3) 25 mcg (1,000 unit) cap Take 1 Capsule by mouth daily. 90 Capsule 3    pantoprazole (PROTONIX) 40 mg tablet TAKE 1 TABLET EVERY DAY 90 Tablet 2    pravastatin (PRAVACHOL) 20 mg tablet TAKE 1 TABLET EVERY DAY 90 Tablet 3    vit A/vit C/vit E/zinc/copper (PRESERVISION AREDS PO) Take  by mouth. ACETAMINOPHEN (TYLENOL PO) Take  by mouth as needed.        Allergies   Allergen Reactions    Lipitor [Atorvastatin] Myalgia    Pravastatin Myalgia    Zetia [Ezetimibe] Other (comments)     itching       Family History   Problem Relation Age of Onset    Diabetes Mother     Hypertension Mother     Stroke Mother      Social History     Tobacco Use    Smoking status: Never    Smokeless tobacco: Never   Substance Use Topics Alcohol use: No         Sergio Lacy MD

## 2023-04-18 DIAGNOSIS — G62.9 NEUROPATHY: ICD-10-CM

## 2023-04-18 RX ORDER — GABAPENTIN 100 MG/1
CAPSULE ORAL
Qty: 6 CAPSULE | Refills: 0 | Status: SHIPPED | OUTPATIENT
Start: 2023-04-18

## 2023-04-18 NOTE — TELEPHONE ENCOUNTER
Pt has been staying with her daughter, has come her home for a couple days. She left her gabapentin at her daughter's, would like enough for 2 nights sent to [facility]. PCP: Patricia Deras MD     Last appt: 3/8/2023     Future Appointments   Date Time Provider [place]   6/14/2023 10:00 AM Patricia Deras MD [facility] BS AMB          Requested Prescriptions     Pending Prescriptions Disp Refills    gabapentin (NEURONTIN) 100 mg capsule 6 Capsule 0     Sig: Take 1-3 Capsules by mouth nightly as needed for Pain. Max Daily Amount: 300 mg.  Indications: neuropathic pain

## 2023-04-18 NOTE — TELEPHONE ENCOUNTER
----- Message from [facility] sent at 4/18/2023  1:45 PM EDT -----  Subject: Refill Request    QUESTIONS  Name of Medication? Other - medication for leg cramp  Patient-reported dosage and instructions? n/a  How many days do you have left? 0  Preferred Pharmacy? [address]  Pharmacy phone number (if available)? [phone]  Additional Information for Provider? patient is trying to get a   prescription sent over to [facility] for leg cramps did not have medication   nor did she remember the name of it please call [address]   [phone]   ---------------------------------------------------------------------------  --------------  [address]  What is the best way for the office to contact you? OK to leave message on   voicemail  Preferred Call Back Phone Number? [phone]  ---------------------------------------------------------------------------  --------------  SCRIPT ANSWERS  Relationship to Patient? Covered Entity  Covered Entity Type? Pharmacy? Representative Name?  Jennifer Granados

## 2023-06-01 LAB
25(OH)D3 SERPL-MCNC: 28.2 NG/ML (ref 30–100)
ALBUMIN SERPL-MCNC: 4 G/DL (ref 3.5–5)
ALBUMIN/GLOB SERPL: 1.4 (ref 1.1–2.2)
ALP SERPL-CCNC: 58 U/L (ref 45–117)
ALT SERPL-CCNC: 19 U/L (ref 12–78)
ANION GAP SERPL CALC-SCNC: 3 MMOL/L (ref 5–15)
AST SERPL-CCNC: 22 U/L (ref 15–37)
BASOPHILS # BLD: 0 K/UL (ref 0–0.1)
BASOPHILS NFR BLD: 1 % (ref 0–1)
BILIRUB SERPL-MCNC: 0.5 MG/DL (ref 0.2–1)
BUN SERPL-MCNC: 13 MG/DL (ref 6–20)
BUN/CREAT SERPL: 15 (ref 12–20)
CALCIUM SERPL-MCNC: 8.8 MG/DL (ref 8.5–10.1)
CHLORIDE SERPL-SCNC: 107 MMOL/L (ref 97–108)
CHOLEST SERPL-MCNC: 193 MG/DL
CO2 SERPL-SCNC: 31 MMOL/L (ref 21–32)
CREAT SERPL-MCNC: 0.88 MG/DL (ref 0.55–1.02)
DIFFERENTIAL METHOD BLD: ABNORMAL
EOSINOPHIL # BLD: 0.3 K/UL (ref 0–0.4)
EOSINOPHIL NFR BLD: 4 % (ref 0–7)
ERYTHROCYTE [DISTWIDTH] IN BLOOD BY AUTOMATED COUNT: 12.9 % (ref 11.5–14.5)
GLOBULIN SER CALC-MCNC: 2.8 G/DL (ref 2–4)
GLUCOSE SERPL-MCNC: 99 MG/DL (ref 65–100)
HCT VFR BLD AUTO: 39.2 % (ref 35–47)
HDLC SERPL-MCNC: 77 MG/DL
HDLC SERPL: 2.5 (ref 0–5)
HGB BLD-MCNC: 12 G/DL (ref 11.5–16)
IMM GRANULOCYTES # BLD AUTO: 0 K/UL (ref 0–0.04)
IMM GRANULOCYTES NFR BLD AUTO: 1 % (ref 0–0.5)
LDLC SERPL CALC-MCNC: 98.2 MG/DL (ref 0–100)
LYMPHOCYTES # BLD: 1.5 K/UL (ref 0.8–3.5)
LYMPHOCYTES NFR BLD: 26 % (ref 12–49)
MCH RBC QN AUTO: 29.6 PG (ref 26–34)
MCHC RBC AUTO-ENTMCNC: 30.6 G/DL (ref 30–36.5)
MCV RBC AUTO: 96.8 FL (ref 80–99)
MONOCYTES # BLD: 0.6 K/UL (ref 0–1)
MONOCYTES NFR BLD: 10 % (ref 5–13)
NEUTS SEG # BLD: 3.5 K/UL (ref 1.8–8)
NEUTS SEG NFR BLD: 58 % (ref 32–75)
NRBC # BLD: 0 K/UL (ref 0–0.01)
NRBC BLD-RTO: 0 PER 100 WBC
PLATELET # BLD AUTO: 150 K/UL (ref 150–400)
PMV BLD AUTO: 11.2 FL (ref 8.9–12.9)
POTASSIUM SERPL-SCNC: 4.9 MMOL/L (ref 3.5–5.1)
PROT SERPL-MCNC: 6.8 G/DL (ref 6.4–8.2)
RBC # BLD AUTO: 4.05 M/UL (ref 3.8–5.2)
SODIUM SERPL-SCNC: 141 MMOL/L (ref 136–145)
TRIGL SERPL-MCNC: 89 MG/DL
VLDLC SERPL CALC-MCNC: 17.8 MG/DL
WBC # BLD AUTO: 5.8 K/UL (ref 3.6–11)

## 2023-08-03 ENCOUNTER — OFFICE VISIT (OUTPATIENT)
Facility: CLINIC | Age: 88
End: 2023-08-03
Payer: MEDICARE

## 2023-08-03 VITALS
SYSTOLIC BLOOD PRESSURE: 116 MMHG | DIASTOLIC BLOOD PRESSURE: 70 MMHG | TEMPERATURE: 98.4 F | HEART RATE: 76 BPM | HEIGHT: 62 IN | WEIGHT: 101 LBS | OXYGEN SATURATION: 98 % | BODY MASS INDEX: 18.58 KG/M2 | RESPIRATION RATE: 12 BRPM

## 2023-08-03 DIAGNOSIS — E78.00 HYPERCHOLESTEROLEMIA: ICD-10-CM

## 2023-08-03 DIAGNOSIS — R42 DIZZINESS: ICD-10-CM

## 2023-08-03 DIAGNOSIS — E55.9 VITAMIN D DEFICIENCY: ICD-10-CM

## 2023-08-03 DIAGNOSIS — F32.9 REACTIVE DEPRESSION: Primary | ICD-10-CM

## 2023-08-03 DIAGNOSIS — R53.82 CHRONIC FATIGUE: ICD-10-CM

## 2023-08-03 DIAGNOSIS — F51.01 PRIMARY INSOMNIA: ICD-10-CM

## 2023-08-03 DIAGNOSIS — L85.3 DRY SKIN: ICD-10-CM

## 2023-08-03 DIAGNOSIS — G62.9 NEUROPATHY: ICD-10-CM

## 2023-08-03 PROCEDURE — 1090F PRES/ABSN URINE INCON ASSESS: CPT | Performed by: INTERNAL MEDICINE

## 2023-08-03 PROCEDURE — G8419 CALC BMI OUT NRM PARAM NOF/U: HCPCS | Performed by: INTERNAL MEDICINE

## 2023-08-03 PROCEDURE — 1036F TOBACCO NON-USER: CPT | Performed by: INTERNAL MEDICINE

## 2023-08-03 PROCEDURE — 1123F ACP DISCUSS/DSCN MKR DOCD: CPT | Performed by: INTERNAL MEDICINE

## 2023-08-03 PROCEDURE — 99215 OFFICE O/P EST HI 40 MIN: CPT | Performed by: INTERNAL MEDICINE

## 2023-08-03 PROCEDURE — G8427 DOCREV CUR MEDS BY ELIG CLIN: HCPCS | Performed by: INTERNAL MEDICINE

## 2023-08-03 RX ORDER — MIRTAZAPINE 7.5 MG/1
7.5 TABLET, FILM COATED ORAL NIGHTLY
Qty: 90 TABLET | Refills: 1 | Status: SHIPPED | OUTPATIENT
Start: 2023-08-03

## 2023-08-03 RX ORDER — MIRTAZAPINE 7.5 MG/1
7.5 TABLET, FILM COATED ORAL NIGHTLY
Qty: 30 TABLET | Refills: 5 | Status: SHIPPED | OUTPATIENT
Start: 2023-08-03

## 2023-08-03 SDOH — ECONOMIC STABILITY: HOUSING INSECURITY
IN THE LAST 12 MONTHS, WAS THERE A TIME WHEN YOU DID NOT HAVE A STEADY PLACE TO SLEEP OR SLEPT IN A SHELTER (INCLUDING NOW)?: NO

## 2023-08-03 SDOH — ECONOMIC STABILITY: INCOME INSECURITY: HOW HARD IS IT FOR YOU TO PAY FOR THE VERY BASICS LIKE FOOD, HOUSING, MEDICAL CARE, AND HEATING?: NOT HARD AT ALL

## 2023-08-03 SDOH — ECONOMIC STABILITY: FOOD INSECURITY: WITHIN THE PAST 12 MONTHS, THE FOOD YOU BOUGHT JUST DIDN'T LAST AND YOU DIDN'T HAVE MONEY TO GET MORE.: NEVER TRUE

## 2023-08-03 SDOH — ECONOMIC STABILITY: FOOD INSECURITY: WITHIN THE PAST 12 MONTHS, YOU WORRIED THAT YOUR FOOD WOULD RUN OUT BEFORE YOU GOT MONEY TO BUY MORE.: NEVER TRUE

## 2023-08-03 NOTE — PROGRESS NOTES
HPI  Ms. Brittany Elizabeth is a 80y.o. year old female, she is seen today for follow up peripheral neuropathy. Also complains of dizziness, insomnia and fatigue. Plan last visit for insomnia and neuropathy:  Neuropathy initially improved with gabapentin, now more issues with pain at night and sleeping - increase to 300mg hs    Tells me she only gets 1-2 hours at night - but says she can't sleep until morning. Wakes around 8:30 - 9am.   Falls asleep watching tv in the evening in bed. Sleep issues for years - broken sleep. Says she will get up and eat b/c she feels hungry which will also keep her awake. Will have occasional dizzy spells - random - may happen at rest. Feels like things are moving around her - will last about 30 minutes. No associated nausea, vomiting or chest pain or sob. Gabapentin 300mg hs helps more with neuropathy. Reports she has draining fluid sometimes from her left heel. Daughter has looked but does not notice anything. Feels sore from rubbing on sheets. Has been going on for years. Won't wear bunny boots to float heels. Then tried pillow to put under her legs to keep heels up but she won't use that either. Says she has been having a hard time adjusting to living with her daughter now - feels a little sad, sometimes thoughts keep her awake. Enjoys working in vegetable garden she has planted herself. Sometimes walks outside. Chief Complaint   Patient presents with    Peripheral Neuropathy    Dizziness    Fatigue        Prior to Admission medications    Medication Sig Start Date End Date Taking?  Authorizing Provider   Multiple Vitamins-Minerals (PRESERVISION AREDS PO) Take by mouth   Yes Historical Provider, MD   psyllium (KONSYL) 28.3 % PACK Take 1 packet by mouth daily   Yes Historical Provider, MD   mirtazapine (REMERON) 7.5 MG tablet Take 1 tablet by mouth nightly 8/3/23  Yes Donaldo Bautista MD   mirtazapine (REMERON) 7.5 MG tablet Take 1 tablet by
with someone who physically or mentally threatens you? N   Is it safe for you to go home?  Y          ADL Screening:   :     ADL ASSESSMENT 6/14/2023   Feeding yourself No Help Needed   Getting from bed to chair No Help Needed   Getting dressed No Help Needed   Bathing or showering No Help Needed   Walk across the room (includes cane/walker) No Help Needed   Using the telphone No Help Needed   Taking your medications No Help Needed   Preparing meals No Help Needed   Managing money (expenses/bills) No Help Needed   Moderately strenuous housework (laundry) No Help Needed   Shopping for personal items (toiletries/medicines) No Help Needed   Shopping for groceries No Help Needed   Driving No Help Needed   Climbing a flight of stairs No Help Needed   Getting to places beyond walking distances No Help Needed

## 2023-09-06 DIAGNOSIS — G62.9 NEUROPATHY: Primary | ICD-10-CM

## 2023-09-07 RX ORDER — GABAPENTIN 100 MG/1
CAPSULE ORAL
Qty: 270 CAPSULE | Refills: 1 | Status: SHIPPED | OUTPATIENT
Start: 2023-09-07 | End: 2024-03-05

## 2023-09-07 NOTE — TELEPHONE ENCOUNTER
PCP: Jenae Lan MD     Last appt:  8/3/2023      Future Appointments   Date Time Provider [address]   11/8/2023 10:00 AM Jenae Lan MD [facility] AMB          Requested Prescriptions     Pending Prescriptions Disp Refills    gabapentin (NEURONTIN) 100 MG capsule [Pharmacy Med Name: GABAPENTIN 100 MG Capsule] 270 capsule 1     Sig: TAKE 1-3 CAPSULES BY MOUTH NIGHTLY AS NEEDED FOR NEUROPATHIC PAIN.  MAX DAILY AMOUNT: 300 MG

## 2023-09-08 ENCOUNTER — TELEPHONE (OUTPATIENT)
Facility: CLINIC | Age: 88
End: 2023-09-08

## 2023-09-08 NOTE — TELEPHONE ENCOUNTER
PCP: Megan Noriega MD     Last appt: 8/3/2023    Future Appointments   Date Time Provider [address]   11/8/2023 10:00 AM Megan Noriega MD [facility] BS AMB          Requested Prescriptions     Pending Prescriptions Disp Refills    vitamin D 25 MCG (1000 UT) CAPS 90 capsule 3     Sig: Take 1,000 capsules by mouth daily

## 2023-10-03 RX ORDER — PRAVASTATIN SODIUM 20 MG
TABLET ORAL
Qty: 90 TABLET | Refills: 4 | Status: SHIPPED | OUTPATIENT
Start: 2023-10-03

## 2023-10-09 ENCOUNTER — HOSPITAL ENCOUNTER (EMERGENCY)
Facility: HOSPITAL | Age: 88
Discharge: HOME | End: 2023-10-10
Attending: STUDENT IN AN ORGANIZED HEALTH CARE EDUCATION/TRAINING PROGRAM
Payer: MEDICARE

## 2023-10-09 DIAGNOSIS — S01.01XA LACERATION OF SCALP, INITIAL ENCOUNTER: ICD-10-CM
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
## 2023-10-09 LAB
BASOPHILS # BLD: 0 K/UL (ref 0–0.1)
BASOPHILS NFR BLD: 0 % (ref 0–1)
COMMENT:: NORMAL
DIFFERENTIAL METHOD BLD: ABNORMAL
EOSINOPHIL # BLD: 0.1 K/UL (ref 0–0.4)
EOSINOPHIL NFR BLD: 1 % (ref 0–7)
ERYTHROCYTE [DISTWIDTH] IN BLOOD BY AUTOMATED COUNT: 13.1 % (ref 11.5–14.5)
HCT VFR BLD AUTO: 39.9 % (ref 35–47)
HGB BLD-MCNC: 12.7 G/DL (ref 11.5–16)
IMM GRANULOCYTES # BLD AUTO: 0.1 K/UL (ref 0–0.04)
IMM GRANULOCYTES NFR BLD AUTO: 1 % (ref 0–0.5)
LYMPHOCYTES # BLD: 1.5 K/UL (ref 0.8–3.5)
LYMPHOCYTES NFR BLD: 17 % (ref 12–49)
MCH RBC QN AUTO: 29.7 PG (ref 26–34)
MCHC RBC AUTO-ENTMCNC: 31.8 G/DL (ref 30–36.5)
MCV RBC AUTO: 93.4 FL (ref 80–99)
MONOCYTES # BLD: 0.5 K/UL (ref 0–1)
MONOCYTES NFR BLD: 5 % (ref 5–13)
NEUTS SEG # BLD: 6.9 K/UL (ref 1.8–8)
NEUTS SEG NFR BLD: 76 % (ref 32–75)
NRBC # BLD: 0 K/UL (ref 0–0.01)
NRBC BLD-RTO: 0 PER 100 WBC
PLATELET # BLD AUTO: 150 K/UL (ref 150–400)
PMV BLD AUTO: 9.4 FL (ref 8.9–12.9)
RBC # BLD AUTO: 4.27 M/UL (ref 3.8–5.2)
SPECIMEN HOLD: NORMAL
WBC # BLD AUTO: 9.1 K/UL (ref 3.6–11)

## 2023-10-09 PROCEDURE — 36415 COLL VENOUS BLD VENIPUNCTURE: CPT

## 2023-10-09 PROCEDURE — 99284 EMERGENCY DEPT VISIT MOD MDM: CPT

## 2023-10-09 PROCEDURE — 84484 ASSAY OF TROPONIN QUANT: CPT

## 2023-10-09 PROCEDURE — 80053 COMPREHEN METABOLIC PANEL: CPT

## 2023-10-09 PROCEDURE — 93005 ELECTROCARDIOGRAM TRACING: CPT | Performed by: STUDENT IN AN ORGANIZED HEALTH CARE EDUCATION/TRAINING PROGRAM

## 2023-10-09 PROCEDURE — 85025 COMPLETE CBC W/AUTO DIFF WBC: CPT

## 2023-10-09 RX ORDER — LIDOCAINE HYDROCHLORIDE AND EPINEPHRINE 10; 10 MG/ML; UG/ML
10 INJECTION, SOLUTION INFILTRATION; PERINEURAL ONCE
Status: COMPLETED | OUTPATIENT
Start: 2023-10-10 | End: 2023-10-10

## 2023-10-09 ASSESSMENT — PAIN - FUNCTIONAL ASSESSMENT: PAIN_FUNCTIONAL_ASSESSMENT: 0-10

## 2023-10-09 ASSESSMENT — PAIN SCALES - GENERAL: PAINLEVEL_OUTOF10: 7

## 2023-10-10 ENCOUNTER — APPOINTMENT (OUTPATIENT)
Facility: HOSPITAL | Age: 88
End: 2023-10-10
Payer: MEDICARE

## 2023-10-10 ENCOUNTER — TELEPHONE (OUTPATIENT)
Facility: CLINIC | Age: 88
End: 2023-10-10

## 2023-10-10 VITALS
OXYGEN SATURATION: 97 % | TEMPERATURE: 97.8 F | SYSTOLIC BLOOD PRESSURE: 158 MMHG | RESPIRATION RATE: 18 BRPM | HEART RATE: 80 BPM | DIASTOLIC BLOOD PRESSURE: 73 MMHG

## 2023-10-10 LAB
ALBUMIN SERPL-MCNC: 3.8 G/DL (ref 3.5–5)
ALBUMIN/GLOB SERPL: 1.1 (ref 1.1–2.2)
ALP SERPL-CCNC: 52 U/L (ref 45–117)
ALT SERPL-CCNC: 30 U/L (ref 12–78)
ANION GAP SERPL CALC-SCNC: 3 MMOL/L (ref 5–15)
AST SERPL-CCNC: 20 U/L (ref 15–37)
BILIRUB SERPL-MCNC: 0.3 MG/DL (ref 0.2–1)
BUN SERPL-MCNC: 17 MG/DL (ref 6–20)
BUN/CREAT SERPL: 18 (ref 12–20)
CALCIUM SERPL-MCNC: 8.9 MG/DL (ref 8.5–10.1)
CHLORIDE SERPL-SCNC: 108 MMOL/L (ref 97–108)
CO2 SERPL-SCNC: 30 MMOL/L (ref 21–32)
CREAT SERPL-MCNC: 0.92 MG/DL (ref 0.55–1.02)
EKG ATRIAL RATE: 68 BPM
EKG DIAGNOSIS: NORMAL
EKG P-R INTERVAL: 216 MS
EKG Q-T INTERVAL: 422 MS
EKG QRS DURATION: 86 MS
EKG QTC CALCULATION (BAZETT): 448 MS
EKG R AXIS: 30 DEGREES
EKG T AXIS: 20 DEGREES
EKG VENTRICULAR RATE: 68 BPM
GLOBULIN SER CALC-MCNC: 3.4 G/DL (ref 2–4)
GLUCOSE SERPL-MCNC: 146 MG/DL (ref 65–100)
POTASSIUM SERPL-SCNC: 4.2 MMOL/L (ref 3.5–5.1)
PROT SERPL-MCNC: 7.2 G/DL (ref 6.4–8.2)
SODIUM SERPL-SCNC: 141 MMOL/L (ref 136–145)
TROPONIN I SERPL HS-MCNC: 11 NG/L (ref 0–51)
TROPONIN I SERPL HS-MCNC: 13 NG/L (ref 0–51)

## 2023-10-10 PROCEDURE — 36415 COLL VENOUS BLD VENIPUNCTURE: CPT

## 2023-10-10 PROCEDURE — 2500000003 HC RX 250 WO HCPCS: Performed by: STUDENT IN AN ORGANIZED HEALTH CARE EDUCATION/TRAINING PROGRAM

## 2023-10-10 PROCEDURE — 12001 RPR S/N/AX/GEN/TRNK 2.5CM/<: CPT

## 2023-10-10 PROCEDURE — 71250 CT THORAX DX C-: CPT

## 2023-10-10 PROCEDURE — 72125 CT NECK SPINE W/O DYE: CPT

## 2023-10-10 PROCEDURE — 70450 CT HEAD/BRAIN W/O DYE: CPT

## 2023-10-10 PROCEDURE — 93010 ELECTROCARDIOGRAM REPORT: CPT | Performed by: INTERNAL MEDICINE

## 2023-10-10 RX ADMIN — LIDOCAINE HYDROCHLORIDE,EPINEPHRINE BITARTRATE 10 ML: 10; .01 INJECTION, SOLUTION INFILTRATION; PERINEURAL at 00:53

## 2023-10-10 ASSESSMENT — PAIN SCALES - GENERAL: PAINLEVEL_OUTOF10: 0

## 2023-10-10 NOTE — PROCEDURES
PROCEDURE NOTE - LACERATION REPAIR:  1:00 AM  Procedure by Jesus Redd PA-C  Complexity: simple   1cm linear laceration to scalp  was irrigated copiously with NS with syringe, prepped with  wound cleanser . The area was anesthetized via local infiltration of 2 mL Lidocaine 1%. The wound was explored with the following results: No foreign bodies found. The wound was repaired with 1 staple. The wound was closed with good hemostasis and approximation. Estimated blood loss: minimal  The procedure took 1-15 minutes, and pt tolerated well.      Daryle Castillo, PA  1:01 AM

## 2023-10-10 NOTE — ED TRIAGE NOTES
Patient arrives via EMS after a GLF in her bathroom at [employer]. Patient reports hitting her head on the tile and has an approximately 1 inch laceration on the back of her head. Reports no LOC or blood thinners. Patient also complains of some chest pain that started after the fall.

## 2023-10-10 NOTE — ED PROVIDER NOTES
[facility] EMERGENCY DEP  EMERGENCY DEPARTMENT ENCOUNTER      Pt Name: Mee Pederson  MRN: [id number]  [address] [facility] [address]il[address] 1/7/1932  Date of evaluation: 10/9/2023  Provider: Aric Hammer MD    CHIEF COMPLAINT       Chief Complaint   Patient presents with    Fall    Chest Pain       HISTORY OF PRESENT ILLNESS    HPI    89F here for trip and fall. Was walking in her bathroom, and slipped falling backward. Sustained 1 inch small laceration to the back of her head. Patient says that since that time she has a mild posterior headache, mild mid thoracic back pain and also is feeling soreness on her anterior chest, she is unsure if she struck this area when she fell. She denies any shortness of breath, cough or hemoptysis. No numbness, tingling, weakness, vision changes. She takes no blood thinners. Laqst tatnus 2 years ago. Nursing notes reviewed. REVIEW OF SYSTEMS     Review of Systems  Unless otherwise stated, a complete review of systems was asked of the patient. Pertinent positives are noted in the HPI section.     PAST MEDICAL HISTORY     Past Medical History:   Diagnosis Date    Arthritis     GERD (gastroesophageal reflux disease)     Hx-TIA (transient ischemic attack) 2006    Hypercholesterolemia     Osteoporosis     Other ill-defined conditions(799.89)     high cholesterol    S/P colonoscopy 2001       SURGICAL HISTORY       Past Surgical History:   Procedure Laterality Date    COLONOSCOPY FLX DX W/COLLJ SPEC WHEN PFRMD  5/25/2012         LACERATION REPAIR  10/10/2023    ORTHOPEDIC SURGERY  2010    left hip replacement    TREADMILL STRESS TEST, W MD SUPERVISION AND REPORT  2007    UPPER GI ENDOSCOPY,BIOPSY  6/10/2019            CURRENT MEDICATIONS       Discharge Medication List as of 10/10/2023  2:53 AM        CONTINUE these medications which have NOT CHANGED    Details   pravastatin (PRAVACHOL) 20 MG tablet TAKE 1 TABLET EVERY DAY, Disp-90 tablet, R-4Normal      vitamin D 25 MCG (1000 UT) CAPS Take 1,000

## 2023-10-10 NOTE — TELEPHONE ENCOUNTER
Pt is sore, daughter concerned pt not breathing deeply. Will use a pillow to hold in front of chest while taking deep breathes every couple hours. No other complaints, daughter will call if anything comes up.  Appointment scheduled for 1 week for hospital follow up and staple removal.

## 2023-10-10 NOTE — TELEPHONE ENCOUNTER
Pt daughter Washington Lyons called and stated that they were in the er yesterday due to a fall and the pt also has eloisa. Washington Lyons stated that the er told them to follow up within the next 2 days with pcp.

## 2023-10-17 ENCOUNTER — OFFICE VISIT (OUTPATIENT)
Facility: CLINIC | Age: 88
End: 2023-10-17
Payer: MEDICARE

## 2023-10-17 VITALS
HEIGHT: 62 IN | BODY MASS INDEX: 17.94 KG/M2 | HEART RATE: 82 BPM | WEIGHT: 97.5 LBS | RESPIRATION RATE: 12 BRPM | TEMPERATURE: 98.1 F | DIASTOLIC BLOOD PRESSURE: 70 MMHG | OXYGEN SATURATION: 97 % | SYSTOLIC BLOOD PRESSURE: 130 MMHG

## 2023-10-17 DIAGNOSIS — R07.89 OTHER CHEST PAIN: ICD-10-CM

## 2023-10-17 DIAGNOSIS — Z23 NEEDS FLU SHOT: ICD-10-CM

## 2023-10-17 DIAGNOSIS — K59.04 CHRONIC IDIOPATHIC CONSTIPATION: Primary | ICD-10-CM

## 2023-10-17 DIAGNOSIS — S01.01XA LACERATION OF SCALP, INITIAL ENCOUNTER: ICD-10-CM

## 2023-10-17 DIAGNOSIS — G62.9 NEUROPATHY: ICD-10-CM

## 2023-10-17 DIAGNOSIS — S22.020A COMPRESSION FRACTURE OF T2 VERTEBRA, INITIAL ENCOUNTER (HCC): ICD-10-CM

## 2023-10-17 PROCEDURE — 1123F ACP DISCUSS/DSCN MKR DOCD: CPT | Performed by: INTERNAL MEDICINE

## 2023-10-17 PROCEDURE — G8484 FLU IMMUNIZE NO ADMIN: HCPCS | Performed by: INTERNAL MEDICINE

## 2023-10-17 PROCEDURE — G8419 CALC BMI OUT NRM PARAM NOF/U: HCPCS | Performed by: INTERNAL MEDICINE

## 2023-10-17 PROCEDURE — 90694 VACC AIIV4 NO PRSRV 0.5ML IM: CPT | Performed by: INTERNAL MEDICINE

## 2023-10-17 PROCEDURE — 99214 OFFICE O/P EST MOD 30 MIN: CPT | Performed by: INTERNAL MEDICINE

## 2023-10-17 PROCEDURE — G8427 DOCREV CUR MEDS BY ELIG CLIN: HCPCS | Performed by: INTERNAL MEDICINE

## 2023-10-17 PROCEDURE — 1036F TOBACCO NON-USER: CPT | Performed by: INTERNAL MEDICINE

## 2023-10-17 PROCEDURE — G0008 ADMIN INFLUENZA VIRUS VAC: HCPCS | Performed by: INTERNAL MEDICINE

## 2023-10-17 PROCEDURE — 1090F PRES/ABSN URINE INCON ASSESS: CPT | Performed by: INTERNAL MEDICINE

## 2023-10-17 RX ORDER — MELATONIN
2000 DAILY
Qty: 180 TABLET | Refills: 1 | Status: SHIPPED | OUTPATIENT
Start: 2023-10-17

## 2023-10-17 RX ORDER — GABAPENTIN 300 MG/1
300 CAPSULE ORAL
Qty: 90 CAPSULE | Refills: 3
Start: 2023-10-17 | End: 2024-10-11

## 2023-11-08 ENCOUNTER — OFFICE VISIT (OUTPATIENT)
Facility: CLINIC | Age: 88
End: 2023-11-08
Payer: MEDICARE

## 2023-11-08 VITALS
HEIGHT: 62 IN | RESPIRATION RATE: 16 BRPM | SYSTOLIC BLOOD PRESSURE: 116 MMHG | DIASTOLIC BLOOD PRESSURE: 60 MMHG | HEART RATE: 78 BPM | BODY MASS INDEX: 18.26 KG/M2 | WEIGHT: 99.2 LBS | TEMPERATURE: 98.4 F | OXYGEN SATURATION: 98 %

## 2023-11-08 DIAGNOSIS — E78.00 HYPERCHOLESTEROLEMIA: ICD-10-CM

## 2023-11-08 DIAGNOSIS — G62.9 NEUROPATHY: Primary | ICD-10-CM

## 2023-11-08 DIAGNOSIS — E55.9 VITAMIN D DEFICIENCY: ICD-10-CM

## 2023-11-08 PROCEDURE — 1036F TOBACCO NON-USER: CPT | Performed by: INTERNAL MEDICINE

## 2023-11-08 PROCEDURE — G8419 CALC BMI OUT NRM PARAM NOF/U: HCPCS | Performed by: INTERNAL MEDICINE

## 2023-11-08 PROCEDURE — 1123F ACP DISCUSS/DSCN MKR DOCD: CPT | Performed by: INTERNAL MEDICINE

## 2023-11-08 PROCEDURE — 1090F PRES/ABSN URINE INCON ASSESS: CPT | Performed by: INTERNAL MEDICINE

## 2023-11-08 PROCEDURE — 99214 OFFICE O/P EST MOD 30 MIN: CPT | Performed by: INTERNAL MEDICINE

## 2023-11-08 PROCEDURE — G8427 DOCREV CUR MEDS BY ELIG CLIN: HCPCS | Performed by: INTERNAL MEDICINE

## 2023-11-08 PROCEDURE — G8484 FLU IMMUNIZE NO ADMIN: HCPCS | Performed by: INTERNAL MEDICINE

## 2023-12-27 RX ORDER — PANTOPRAZOLE SODIUM 40 MG/1
TABLET, DELAYED RELEASE ORAL
Qty: 90 TABLET | Refills: 3 | Status: SHIPPED | OUTPATIENT
Start: 2023-12-27

## 2023-12-27 NOTE — TELEPHONE ENCOUNTER
PCP: Radha Gonzalez MD     Last appt: 11/8/2023    Future Appointments   Date Time Provider [address]   5/15/2024 10:30 AM Radha Gonzalez MD [facility] BS AMB          Requested Prescriptions     Pending Prescriptions Disp Refills    pantoprazole (PROTONIX) 40 MG tablet [Pharmacy Med Name: PANTOPRAZOLE SODIUM 40 MG Tablet Delayed Release] 90 tablet 3     Sig: TAKE 1 TABLET EVERY DAY

## 2024-01-05 DIAGNOSIS — G62.9 NEUROPATHY: ICD-10-CM

## 2024-01-05 RX ORDER — GABAPENTIN 300 MG/1
300 CAPSULE ORAL NIGHTLY
Qty: 14 CAPSULE | Refills: 0 | Status: SHIPPED | OUTPATIENT
Start: 2024-01-05 | End: 2024-01-19

## 2024-01-05 RX ORDER — GABAPENTIN 300 MG/1
300 CAPSULE ORAL
Qty: 90 CAPSULE | Refills: 0 | Status: SHIPPED | OUTPATIENT
Start: 2024-01-05 | End: 2024-04-04

## 2024-01-05 NOTE — TELEPHONE ENCOUNTER
Pt daughter stated [facility] not have gabapentin (NEURONTIN) 300 MG capsule and wanted to know if pcp could send in a short prescription to pharmacy, pt has been out for three days

## 2024-01-05 NOTE — TELEPHONE ENCOUNTER
PCP: Bell Rodriguez MD     Last appt: 11/8/2023    Future Appointments   Date Time Provider Department Center   5/15/2024 10:30 AM Bell Rodriugez MD [facility] BS AMB          Requested Prescriptions     Pending Prescriptions Disp Refills    gabapentin (NEURONTIN) 300 MG capsule 14 capsule 0     Sig: Take 1 capsule by mouth nightly for 14 days. Max Daily Amount: 300 mg    gabapentin (NEURONTIN) 300 MG capsule 90 capsule 3     Sig: Take 1 capsule by mouth nightly for 360 days. Max Daily Amount: 300 mg

## 2024-01-31 ENCOUNTER — TELEPHONE (OUTPATIENT)
Facility: CLINIC | Age: 89
End: 2024-01-31

## 2024-01-31 NOTE — TELEPHONE ENCOUNTER
Karen states pt hit leg on door 4 weeks ago, red and swollen. Appointment given for 2/02, informed to go to ER for fever, chills or red streaks.

## 2024-01-31 NOTE — TELEPHONE ENCOUNTER
Pt daughter called with concerns regarding pt hitting her leg on a car door a week ago and stated it is still very red with minimal pain. She wants to know if they need ant kind of wound care. Can call daughter back

## 2024-02-02 ENCOUNTER — OFFICE VISIT (OUTPATIENT)
Facility: CLINIC | Age: 89
End: 2024-02-02
Payer: MEDICARE

## 2024-02-02 VITALS
OXYGEN SATURATION: 96 % | TEMPERATURE: 98.4 F | WEIGHT: 105 LBS | RESPIRATION RATE: 12 BRPM | DIASTOLIC BLOOD PRESSURE: 70 MMHG | BODY MASS INDEX: 19.32 KG/M2 | HEART RATE: 78 BPM | SYSTOLIC BLOOD PRESSURE: 122 MMHG | HEIGHT: 62 IN

## 2024-02-02 DIAGNOSIS — R25.1 TREMOR: ICD-10-CM

## 2024-02-02 DIAGNOSIS — R51.9 NONINTRACTABLE EPISODIC HEADACHE, UNSPECIFIED HEADACHE TYPE: ICD-10-CM

## 2024-02-02 DIAGNOSIS — R42 DIZZINESS: ICD-10-CM

## 2024-02-02 DIAGNOSIS — K59.00 CONSTIPATION, UNSPECIFIED CONSTIPATION TYPE: ICD-10-CM

## 2024-02-02 DIAGNOSIS — L03.116 CELLULITIS OF LEFT LOWER EXTREMITY: Primary | ICD-10-CM

## 2024-02-02 PROCEDURE — G8420 CALC BMI NORM PARAMETERS: HCPCS | Performed by: INTERNAL MEDICINE

## 2024-02-02 PROCEDURE — 1036F TOBACCO NON-USER: CPT | Performed by: INTERNAL MEDICINE

## 2024-02-02 PROCEDURE — 1123F ACP DISCUSS/DSCN MKR DOCD: CPT | Performed by: INTERNAL MEDICINE

## 2024-02-02 PROCEDURE — G8427 DOCREV CUR MEDS BY ELIG CLIN: HCPCS | Performed by: INTERNAL MEDICINE

## 2024-02-02 PROCEDURE — 99214 OFFICE O/P EST MOD 30 MIN: CPT | Performed by: INTERNAL MEDICINE

## 2024-02-02 PROCEDURE — G8484 FLU IMMUNIZE NO ADMIN: HCPCS | Performed by: INTERNAL MEDICINE

## 2024-02-02 PROCEDURE — 1090F PRES/ABSN URINE INCON ASSESS: CPT | Performed by: INTERNAL MEDICINE

## 2024-02-02 RX ORDER — CEPHALEXIN 250 MG/1
250 CAPSULE ORAL 4 TIMES DAILY
Qty: 40 CAPSULE | Refills: 0 | Status: SHIPPED | OUTPATIENT
Start: 2024-02-02 | End: 2024-02-12

## 2024-02-02 ASSESSMENT — PATIENT HEALTH QUESTIONNAIRE - PHQ9
8. MOVING OR SPEAKING SO SLOWLY THAT OTHER PEOPLE COULD HAVE NOTICED. OR THE OPPOSITE, BEING SO FIGETY OR RESTLESS THAT YOU HAVE BEEN MOVING AROUND A LOT MORE THAN USUAL: 0
SUM OF ALL RESPONSES TO PHQ QUESTIONS 1-9: 6
10. IF YOU CHECKED OFF ANY PROBLEMS, HOW DIFFICULT HAVE THESE PROBLEMS MADE IT FOR YOU TO DO YOUR WORK, TAKE CARE OF THINGS AT HOME, OR GET ALONG WITH OTHER PEOPLE: 0
5. POOR APPETITE OR OVEREATING: 0
SUM OF ALL RESPONSES TO PHQ9 QUESTIONS 1 & 2: 0
SUM OF ALL RESPONSES TO PHQ QUESTIONS 1-9: 6
7. TROUBLE CONCENTRATING ON THINGS, SUCH AS READING THE NEWSPAPER OR WATCHING TELEVISION: 0
SUM OF ALL RESPONSES TO PHQ QUESTIONS 1-9: 6
2. FEELING DOWN, DEPRESSED OR HOPELESS: 0
9. THOUGHTS THAT YOU WOULD BE BETTER OFF DEAD, OR OF HURTING YOURSELF: 0
4. FEELING TIRED OR HAVING LITTLE ENERGY: 3
1. LITTLE INTEREST OR PLEASURE IN DOING THINGS: 0
3. TROUBLE FALLING OR STAYING ASLEEP: 3
SUM OF ALL RESPONSES TO PHQ QUESTIONS 1-9: 6

## 2024-02-02 NOTE — PROGRESS NOTES
Gemma Mcgee  Identified pt with two pt identifiers(name and ).     Chief Complaint   Patient presents with    Wound Check    Tremors     Yesterday at Dr Peña       Reviewed record In preparation for visit and have obtained necessary documentation.     1. Have you been to the ER, urgent care clinic or hospitalized since your last visit? No     2. Have you seen or consulted any other health care providers outside of the [facility] since your last visit? Include any pap smears or colon screening. No    Vitals reviewed with provider.    Health Maintenance reviewed:     Health Maintenance Due   Topic    Respiratory Syncytial Virus (RSV) Pregnant or age 60 yrs+ (1 - 1-dose 60+ series)    Annual Wellness Visit (Medicare Advantage)           Wt Readings from Last 3 Encounters:   24 47.6 kg (105 lb)   23 45 kg (99 lb 3.2 oz)   10/17/23 44.2 kg (97 lb 8 oz)        Temp Readings from Last 3 Encounters:   24 98.4 °F (36.9 °C) (Oral)   23 98.4 °F (36.9 °C) (Oral)   10/17/23 98.1 °F (36.7 °C) (Oral)        BP Readings from Last 3 Encounters:   24 (!) 148/77   23 116/60   10/17/23 130/70        Pulse Readings from Last 3 Encounters:   24 78   23 78   10/17/23 82      [unfilled]       Learning Assessment:   :         2023     8:20 AM   [facility] AMB LEARNING ASSESSMENT   Primary Learner Patient   level of education DID NOT GRADUATE HIGH SCHOOL   Barriers Factors NONE   co-learner caregiver No   Primary Language ENGLISH   Learning Preference DEMONSTRATION   Answered By patient   Relationship to Learner SELF         Fall Risk Assessment:   :         8/3/2023     9:07 AM 3/8/2023    11:12 AM 2022    10:15 AM 2021     9:00 AM 2021     3:28 PM 2021     3:03 PM   Amb Fall Risk Assessment and TUG Test   Do you feel unsteady or are you worried about falling?  no        2 or more falls in past year? no        Fall with injury in past year? no        Fall 
Pravastatin Myalgia         REVIEW OF SYSTEMS:  Per HPI    PHYSICAL EXAM:  /70   Pulse 78   Temp 98.4 °F (36.9 °C) (Oral)   Resp 12   Ht 1.575 m (5' 2\")   Wt 47.6 kg (105 lb)   SpO2 96%   BMI 19.20 kg/m²   Constitutional: Appears well-developed and well-nourished. No distress.   HENT:   Head: Normocephalic and atraumatic.   Eyes: No scleral icterus.   Ears: tm's wnl   Cardiovascular: Normal S1/S2, regular rhythm.  No murmurs, rubs, or gallops.  Pulmonary/Chest: Effort normal and breath sounds normal. No respiratory distress. No wheezes, rhonchi, or rales.   Left leg: approx 4x3cm area of erythema, warmth and tenderness with abrasions, scabs and pustules superiorly  Ext: No edema.   Neurological: Alert.   Psychiatric: Normal mood and affect. Behavior is normal.     Lab Results   Component Value Date/Time     10/09/2023 11:47 PM    K 4.2 10/09/2023 11:47 PM     10/09/2023 11:47 PM    CO2 30 10/09/2023 11:47 PM    BUN 17 10/09/2023 11:47 PM    GFRAA 66 11/26/2021 10:07 AM    GLOB 3.4 10/09/2023 11:47 PM    ALT 30 10/09/2023 11:47 PM     No results found for: \"HBA1C\"   Lab Results   Component Value Date/Time    CHOL 193 05/31/2023 09:37 AM    HDL 77 05/31/2023 09:37 AM    VLDL 12 07/06/2022 09:16 AM          ASSESSMENT/PLAN  Gemma was seen today for wound check and tremors.    Diagnoses and all orders for this visit:    Cellulitis of left lower extremity  -     cephALEXin (KEFLEX) 250 MG capsule; Take 1 capsule by mouth 4 times daily for 10 days    Tremor  -     MRI BRAIN W WO CONTRAST; Future    Nonintractable episodic headache, unspecified headache type  -     MRI BRAIN W WO CONTRAST; Future    Dizziness  -     MRI BRAIN W WO CONTRAST; Future    Constipation, unspecified constipation type  -     XR ACUTE ABD SERIES CHEST 1 VW; Future      Chronic constipation - get plain films to start, advised miralax daily  HA, dizziness, eye symptoms - get MRI  Treat cellulitis as above, has appointment

## 2024-02-07 ENCOUNTER — HOSPITAL ENCOUNTER (OUTPATIENT)
Facility: HOSPITAL | Age: 89
Discharge: HOME | End: 2024-02-10
Attending: INTERNAL MEDICINE
Payer: MEDICARE

## 2024-02-07 DIAGNOSIS — R25.1 TREMOR: ICD-10-CM

## 2024-02-07 DIAGNOSIS — R51.9 NONINTRACTABLE EPISODIC HEADACHE, UNSPECIFIED HEADACHE TYPE: ICD-10-CM

## 2024-02-07 DIAGNOSIS — K59.00 CONSTIPATION, UNSPECIFIED CONSTIPATION TYPE: ICD-10-CM

## 2024-02-07 DIAGNOSIS — R42 DIZZINESS: ICD-10-CM

## 2024-02-07 PROCEDURE — A9579 GAD-BASE MR CONTRAST NOS,1ML: HCPCS | Performed by: INTERNAL MEDICINE

## 2024-02-07 PROCEDURE — 74022 RADEX COMPL AQT ABD SERIES: CPT

## 2024-02-07 PROCEDURE — 70553 MRI BRAIN STEM W/O & W/DYE: CPT

## 2024-02-07 PROCEDURE — 6360000004 HC RX CONTRAST MEDICATION: Performed by: INTERNAL MEDICINE

## 2024-02-07 RX ADMIN — GADOTERIDOL 10 ML: 279.3 INJECTION, SOLUTION INTRAVENOUS at 19:13

## 2024-02-09 ENCOUNTER — OFFICE VISIT (OUTPATIENT)
Facility: CLINIC | Age: 89
End: 2024-02-09
Payer: MEDICARE

## 2024-02-09 VITALS
WEIGHT: 105.2 LBS | RESPIRATION RATE: 16 BRPM | HEIGHT: 62 IN | HEART RATE: 82 BPM | TEMPERATURE: 97.6 F | BODY MASS INDEX: 19.36 KG/M2 | OXYGEN SATURATION: 97 % | SYSTOLIC BLOOD PRESSURE: 137 MMHG | DIASTOLIC BLOOD PRESSURE: 77 MMHG

## 2024-02-09 DIAGNOSIS — L03.116 CELLULITIS OF LEFT LOWER EXTREMITY: Primary | ICD-10-CM

## 2024-02-09 DIAGNOSIS — R25.1 TREMOR: ICD-10-CM

## 2024-02-09 DIAGNOSIS — K59.04 CHRONIC IDIOPATHIC CONSTIPATION: ICD-10-CM

## 2024-02-09 PROCEDURE — 99214 OFFICE O/P EST MOD 30 MIN: CPT | Performed by: INTERNAL MEDICINE

## 2024-02-09 PROCEDURE — G8420 CALC BMI NORM PARAMETERS: HCPCS | Performed by: INTERNAL MEDICINE

## 2024-02-09 PROCEDURE — 1090F PRES/ABSN URINE INCON ASSESS: CPT | Performed by: INTERNAL MEDICINE

## 2024-02-09 PROCEDURE — G8484 FLU IMMUNIZE NO ADMIN: HCPCS | Performed by: INTERNAL MEDICINE

## 2024-02-09 PROCEDURE — 1036F TOBACCO NON-USER: CPT | Performed by: INTERNAL MEDICINE

## 2024-02-09 PROCEDURE — G8427 DOCREV CUR MEDS BY ELIG CLIN: HCPCS | Performed by: INTERNAL MEDICINE

## 2024-02-09 PROCEDURE — 1123F ACP DISCUSS/DSCN MKR DOCD: CPT | Performed by: INTERNAL MEDICINE

## 2024-02-09 RX ORDER — DOXYCYCLINE HYCLATE 100 MG
100 TABLET ORAL 2 TIMES DAILY
Qty: 20 TABLET | Refills: 0 | Status: SHIPPED | OUTPATIENT
Start: 2024-02-09 | End: 2024-02-19

## 2024-02-09 NOTE — PROGRESS NOTES
HPI  Ms. Gemma Mcgee is a 89 y.o. year old female, she is seen today for follow up left leg cellulitis.   Wound is still painful, no f/c or n/v. Rinses with water after applying soap  Had MRI-brain for tremor, including vocal tremor, as well as left eye twitching.  We went over the results which showed chronic microvascular ischemic changes.  She has been to ENT in the past for the tremor and reportedly was referred to speech therapy but she declined.  She still declines.  I suspect this is essential tremor.  She also had abdominal films completed but I do not have results yet.  This was for chronic constipation.    Chief Complaint   Patient presents with    Follow-up     Acute care: celulitis        Prior to Admission medications    Medication Sig Start Date End Date Taking? Authorizing Provider   doxycycline hyclate (VIBRA-TABS) 100 MG tablet Take 1 tablet by mouth 2 times daily for 10 days 2/9/24 2/19/24 Yes Bell Rodriguez MD   cephALEXin (KEFLEX) 250 MG capsule Take 1 capsule by mouth 4 times daily for 10 days 2/2/24 2/12/24 Yes Bell Rodriguez MD   gabapentin (NEURONTIN) 300 MG capsule Take 1 capsule by mouth nightly for 90 days. Max Daily Amount: 300 mg 1/5/24 4/4/24 Yes Annamarie Coello MD   pantoprazole (PROTONIX) 40 MG tablet TAKE 1 TABLET EVERY DAY 12/27/23  Yes Bell Rodriguez MD   vitamin D3 (CHOLECALCIFEROL) 25 MCG (1000 UT) TABS tablet Take 2 tablets by mouth daily 10/17/23  Yes Bell Rodriguez MD   pravastatin (PRAVACHOL) 20 MG tablet TAKE 1 TABLET EVERY DAY 10/3/23  Yes Bell Rodriguez MD   Multiple Vitamins-Minerals (PRESERVISION AREDS PO) Take by mouth   Yes Taylor Brown MD   psyllium (KONSYL) 28.3 % PACK Take 1 packet by mouth daily   Yes Taylor Brown MD   polyethylene glycol (GLYCOLAX) 17 GM/SCOOP powder Take by mouth   Yes Automatic Reconciliation, Ar         Allergies   Allergen Reactions    Atorvastatin Myalgia    Ezetimibe Other (See Comments)

## 2024-02-09 NOTE — PROGRESS NOTES
Gemma Mcgee is a 89 y.o. female    Chief Complaint   Patient presents with    Follow-up     Acute care: celulitis       /77   Pulse 82   Temp 97.6 °F (36.4 °C)   Resp 16   Ht 1.575 m (5' 2\")   Wt 47.7 kg (105 lb 3.2 oz)   SpO2 97%   BMI 19.24 kg/m²         1. Have you been to the ER, urgent care clinic since your last visit?  Hospitalized since your last visit? No    2. Have you seen or consulted any other health care providers outside of the [facility] since your last visit?  Include any pap smears or colon screening. No    Learning Assessment:      2023     8:20 AM   [facility] AMB LEARNING ASSESSMENT   Primary Learner Patient   level of education DID NOT GRADUATE HIGH SCHOOL   Barriers Factors NONE   co-learner caregiver No   Primary Language ENGLISH   Learning Preference DEMONSTRATION   Answered By patient   Relationship to Learner SELF       Fall Risk Assessment:      8/3/2023     9:07 AM 3/8/2023    11:12 AM 2022    10:15 AM 2021     9:00 AM 2021     3:28 PM 2021     3:03 PM   Amb Fall Risk Assessment and TUG Test   Do you feel unsteady or are you worried about falling?  no        2 or more falls in past year? no        Fall with injury in past year? no        Fall in past 12 months?  0 0 0 0 0   Able to walk?  Yes Yes Yes Yes Yes       Abuse Screenin/3/2023     8:00 AM   AMB Abuse Screening   Do you ever feel afraid of your partner? N   Are you in a relationship with someone who physically or mentally threatens you? N   Is it safe for you to go home? Y       ADL Screenin/14/2023     8:00 AM   ADL ASSESSMENT   Feeding yourself No Help Needed   Getting from bed to chair No Help Needed   Getting dressed No Help Needed   Bathing or showering No Help Needed   Walk across the room (includes cane/walker) No Help Needed   Using the telphone No Help Needed   Taking your medications No Help Needed   Preparing meals No Help Needed   Managing money

## 2024-02-14 ENCOUNTER — HOSPITAL ENCOUNTER (OUTPATIENT)
Facility: HOSPITAL | Age: 89
Discharge: HOME | End: 2024-02-14
Attending: FAMILY MEDICINE
Payer: MEDICARE

## 2024-02-14 VITALS
WEIGHT: 105 LBS | TEMPERATURE: 98 F | DIASTOLIC BLOOD PRESSURE: 79 MMHG | RESPIRATION RATE: 17 BRPM | BODY MASS INDEX: 19.32 KG/M2 | SYSTOLIC BLOOD PRESSURE: 145 MMHG | HEIGHT: 62 IN | HEART RATE: 82 BPM

## 2024-02-14 DIAGNOSIS — I83.222 VARICOSE VEINS OF LEFT LOWER EXTREMITY WITH INFLAMMATION, WITH ULCER OF CALF WITH FAT LAYER EXPOSED (HCC): ICD-10-CM

## 2024-02-14 DIAGNOSIS — L97.822 ULCER OF LEFT SHIN WITH FAT LAYER EXPOSED (HCC): Primary | ICD-10-CM

## 2024-02-14 DIAGNOSIS — L97.222 VARICOSE VEINS OF LEFT LOWER EXTREMITY WITH INFLAMMATION, WITH ULCER OF CALF WITH FAT LAYER EXPOSED (HCC): ICD-10-CM

## 2024-02-14 PROBLEM — L97.922 VARICOSE VEINS OF LEFT LOWER EXTREMITY WITH BOTH INFLAMMATION AND ULCER WITH FAT LAYER EXPOSED (HCC): Status: ACTIVE | Noted: 2024-02-14

## 2024-02-14 PROBLEM — I83.229 VARICOSE VEINS OF LEFT LOWER EXTREMITY WITH BOTH INFLAMMATION AND ULCER WITH FAT LAYER EXPOSED (HCC): Status: ACTIVE | Noted: 2024-02-14

## 2024-02-14 PROCEDURE — 11042 DBRDMT SUBQ TIS 1ST 20SQCM/<: CPT

## 2024-02-14 PROCEDURE — 99213 OFFICE O/P EST LOW 20 MIN: CPT

## 2024-02-14 RX ORDER — LIDOCAINE HYDROCHLORIDE 20 MG/ML
JELLY TOPICAL ONCE
Status: CANCELLED | OUTPATIENT
Start: 2024-02-14 | End: 2024-02-14

## 2024-02-14 RX ORDER — LIDOCAINE 40 MG/G
CREAM TOPICAL ONCE
Status: CANCELLED | OUTPATIENT
Start: 2024-02-14 | End: 2024-02-14

## 2024-02-14 RX ORDER — BACITRACIN ZINC AND POLYMYXIN B SULFATE 500; 1000 [USP'U]/G; [USP'U]/G
OINTMENT TOPICAL ONCE
OUTPATIENT
Start: 2024-02-14 | End: 2024-02-14

## 2024-02-14 RX ORDER — CLOBETASOL PROPIONATE 0.5 MG/G
OINTMENT TOPICAL ONCE
Status: CANCELLED | OUTPATIENT
Start: 2024-02-14 | End: 2024-02-14

## 2024-02-14 RX ORDER — BACITRACIN ZINC AND POLYMYXIN B SULFATE 500; 1000 [USP'U]/G; [USP'U]/G
OINTMENT TOPICAL ONCE
Status: CANCELLED | OUTPATIENT
Start: 2024-02-14 | End: 2024-02-14

## 2024-02-14 RX ORDER — GINSENG 100 MG
CAPSULE ORAL ONCE
Status: CANCELLED | OUTPATIENT
Start: 2024-02-14 | End: 2024-02-14

## 2024-02-14 RX ORDER — BETAMETHASONE DIPROPIONATE 0.5 MG/G
CREAM TOPICAL ONCE
Status: CANCELLED | OUTPATIENT
Start: 2024-02-14 | End: 2024-02-14

## 2024-02-14 RX ORDER — GENTAMICIN SULFATE 1 MG/G
OINTMENT TOPICAL ONCE
OUTPATIENT
Start: 2024-02-14 | End: 2024-02-14

## 2024-02-14 RX ORDER — TRIAMCINOLONE ACETONIDE 1 MG/G
OINTMENT TOPICAL ONCE
OUTPATIENT
Start: 2024-02-14 | End: 2024-02-14

## 2024-02-14 RX ORDER — LIDOCAINE 50 MG/G
OINTMENT TOPICAL ONCE
Status: CANCELLED | OUTPATIENT
Start: 2024-02-14 | End: 2024-02-14

## 2024-02-14 RX ORDER — LIDOCAINE HYDROCHLORIDE 40 MG/ML
SOLUTION TOPICAL ONCE
Status: CANCELLED | OUTPATIENT
Start: 2024-02-14 | End: 2024-02-14

## 2024-02-14 RX ORDER — LIDOCAINE HYDROCHLORIDE 20 MG/ML
JELLY TOPICAL ONCE
OUTPATIENT
Start: 2024-02-14 | End: 2024-02-14

## 2024-02-14 RX ORDER — TRIAMCINOLONE ACETONIDE 1 MG/G
OINTMENT TOPICAL ONCE
Status: CANCELLED | OUTPATIENT
Start: 2024-02-14 | End: 2024-02-14

## 2024-02-14 RX ORDER — LIDOCAINE HYDROCHLORIDE 40 MG/ML
SOLUTION TOPICAL ONCE
OUTPATIENT
Start: 2024-02-14 | End: 2024-02-14

## 2024-02-14 RX ORDER — CLOBETASOL PROPIONATE 0.5 MG/G
OINTMENT TOPICAL ONCE
OUTPATIENT
Start: 2024-02-14 | End: 2024-02-14

## 2024-02-14 RX ORDER — SODIUM CHLOR/HYPOCHLOROUS ACID 0.033 %
SOLUTION, IRRIGATION IRRIGATION ONCE
OUTPATIENT
Start: 2024-02-14 | End: 2024-02-14

## 2024-02-14 RX ORDER — IBUPROFEN 200 MG
TABLET ORAL ONCE
OUTPATIENT
Start: 2024-02-14 | End: 2024-02-14

## 2024-02-14 RX ORDER — LIDOCAINE 50 MG/G
OINTMENT TOPICAL ONCE
OUTPATIENT
Start: 2024-02-14 | End: 2024-02-14

## 2024-02-14 RX ORDER — LIDOCAINE 40 MG/G
CREAM TOPICAL ONCE
OUTPATIENT
Start: 2024-02-14 | End: 2024-02-14

## 2024-02-14 RX ORDER — SODIUM CHLOR/HYPOCHLOROUS ACID 0.033 %
SOLUTION, IRRIGATION IRRIGATION ONCE
Status: CANCELLED | OUTPATIENT
Start: 2024-02-14 | End: 2024-02-14

## 2024-02-14 RX ORDER — GINSENG 100 MG
CAPSULE ORAL ONCE
OUTPATIENT
Start: 2024-02-14 | End: 2024-02-14

## 2024-02-14 RX ORDER — BETAMETHASONE DIPROPIONATE 0.5 MG/G
CREAM TOPICAL ONCE
OUTPATIENT
Start: 2024-02-14 | End: 2024-02-14

## 2024-02-14 RX ORDER — IBUPROFEN 200 MG
TABLET ORAL ONCE
Status: CANCELLED | OUTPATIENT
Start: 2024-02-14 | End: 2024-02-14

## 2024-02-14 RX ORDER — GENTAMICIN SULFATE 1 MG/G
OINTMENT TOPICAL ONCE
Status: CANCELLED | OUTPATIENT
Start: 2024-02-14 | End: 2024-02-14

## 2024-02-14 NOTE — PLAN OF CARE
02/14/24 1357   Post-Procedure Depth (cm) 0.2 cm 02/14/24 1357   Post-Procedure Surface Area (cm^2) 6.48 cm^2 02/14/24 1357   Post-Procedure Volume (cm^3) 1.296 cm^3 02/14/24 1357   Wound Assessment Pink/red 02/14/24 1333   Drainage Amount Small (< 25%) 02/14/24 1333   Drainage Description Serosanguinous 02/14/24 1333   Odor None 02/14/24 1333   Fabiola-wound Assessment Maceration;Blanchable erythema 02/14/24 1333   Margins Flat/open edges 02/14/24 1333   Wound Thickness Description not for Pressure Injury Partial thickness 02/14/24 1333   Number of days: 0          Supplies Requested :      WOUND #: 1   PRIMARY DRESSING:  Collagen with silver, Hydrofera Blue ready   Cover and Secure with: Bulky roll gauze     FREQUENCY OF DRESSING CHANGES:  Three times per week         ADDITIONAL ITEMS:  [] Gloves Small  [x] Gloves Medium [] Gloves Large [] Gloves XLarge  [] Tape 1\" [x] Tape 2\" [] Tape 3\"  [] Medipore Tape  [x] Saline  [] Vashe  [] Skin Prep   [] Adhesive Remover   [] Cotton Tip Applicators   [] Other:    Patient Wound(s) Debrided: [] Yes if yes please add date 2/14/24   [x] No    Debribement Type: Excisional/Sharp    Is the patient currently on an antibiotic for their Wound(s): [] Yes if yes please add name and dose    [x] No    Patient currently being seen by Home Health: [] Yes   [x] No    Duration for needed supplies:  []15  [x]30  []60  []90 Days    Electronically signed by LIZZY WEBB RN on 2/14/2024 at 4:03 PM     Provider Information:      PROVIDER'S NAME: Linnea Royce    NPI: [id number]

## 2024-02-14 NOTE — PATIENT INSTRUCTIONS
Discharge Instructions/Wound Orders  [facility]  [address] [address]  Telephone: [phone]    FAX [phone]    NAME:  Gemma Mcgee  YOB: 1932  MEDICAL RECORD NUMBER:  [id number]  DATE:  February 14, 2024    Wound Care Orders:  Left leg wound - Cleanse with baby shampoo. Rinse and pat dry. Apply moisés to wound bed. Cover with hydrofera blue ready. Cover with gauze and roll gauze. Secure with tape. Apply single layer tubigrip size \"E\". Change weekly in clinic.    Discontinue oral antibiotic.     Dietary:  [x] Diet as tolerated      [] Diabetic Diet            [] Increase Protein: examples (Meat, cheese, eggs, greek yogurt, fish, nuts)          [] Jabari Therapeutic Nutrition Powder       Return Appointment:  [x] Return Appointment: With Dr. Linnea Crane in 1 week  [] Ordered tests:      Electronically signed Chani Espana RN on 2/14/2024 at 1:54 PM     Wound Care Center Information: Should you experience any significant changes in your wound(s) or have questions about your wound care, please contact the [facility] at MONDAY - FRIDAY 8:00 am - 4:30.  If you need help with your wound outside these hours and cannot wait until we are again available, contact your PCP or go to the hospital emergency room.     PLEASE NOTE: IF YOU ARE UNABLE TO OBTAIN WOUND SUPPLIES, CONTINUE TO USE THE SUPPLIES YOU HAVE AVAILABLE UNTIL YOU ARE ABLE TO REACH US. IT IS MOST IMPORTANT TO KEEP THE WOUND COVERED AT ALL TIMES.     Physician Signature:_______________________    Date: ___________ Time:  ____________

## 2024-02-14 NOTE — FLOWSHEET NOTE
02/14/24 1333   Anesthetic   Anesthetic 4% Lidocaine Liquid Topical   Right Leg Edema Point of Measurement   Leg circumference 30 cm   Ankle circumference 21 cm   Left Leg Edema Point of Measurement   Leg circumference 30 cm   Ankle circumference 20.5 cm   Peripheral Vascular   Peripheral Vascular (WDL) X   RLE Neurovascular Assessment   Capillary Refill Less than/Equal to 3 seconds   Color Appropriate for Ethnicity   Temperature Cool   RLE Sensation  Full sensation   R Pedal Pulse +2   LLE Neurovascular Assessment   Capillary Refill Less than/Equal to 3 seconds   Color Appropriate for Ethnicity   Temperature Cool   LLE Sensation  Full sensation   L Pedal Pulse +2   Wound 02/14/24 Pretibial Left;Proximal   Date First Assessed/Time First Assessed: 02/14/24 1338   Present on Original Admission: Yes  Wound Approximate Age at First Assessment (Weeks): 3 weeks  Primary Wound Type: Traumatic  Location: Pretibial  Wound Location Orientation: Left;Proximal   Wound Image    Wound Etiology Traumatic   Dressing Status Old drainage noted;Intact   Wound Length (cm) 2.4 cm   Wound Width (cm) 2.7 cm   Wound Depth (cm) 0.1 cm   Wound Surface Area (cm^2) 6.48 cm^2   Wound Volume (cm^3) 0.648 cm^3   Wound Assessment Pink/red   Drainage Amount Small (< 25%)   Drainage Description Serosanguinous   Odor None   Fabiola-wound Assessment Maceration;Blanchable erythema   Margins Flat/open edges   Wound Thickness Description not for Pressure Injury Partial thickness   Pain Assessment   Pain Assessment None - Denies Pain     BP (!) 145/79   Pulse 82   Temp 98 °F (36.7 °C) (Temporal)   Resp 17   Ht 1.575 m (5' 2\")   Wt 47.6 kg (105 lb)   BMI 19.20 kg/m²

## 2024-02-14 NOTE — PROGRESS NOTES
0.2 cm 02/14/24 1357   Post-Procedure Surface Area (cm^2) 6.48 cm^2 02/14/24 1357   Post-Procedure Volume (cm^3) 1.296 cm^3 02/14/24 1357   Wound Assessment Pink/red 02/14/24 1333   Drainage Amount Small (< 25%) 02/14/24 1333   Drainage Description Serosanguinous 02/14/24 1333   Odor None 02/14/24 1333   Fabiola-wound Assessment Maceration;Blanchable erythema 02/14/24 1333   Margins Flat/open edges 02/14/24 1333   Wound Thickness Description not for Pressure Injury Partial thickness 02/14/24 1333   Number of days: 0          -------    Past Medical History:   Diagnosis Date    Arthritis     GERD (gastroesophageal reflux disease)     Hx-TIA (transient ischemic attack) 2006    Hypercholesterolemia     Osteoporosis     Other ill-defined conditions(799.89)     high cholesterol    S/P colonoscopy 2001     Past Surgical History:   Procedure Laterality Date    COLONOSCOPY FLX DX W/COLLJ SPEC WHEN PFRMD  5/25/2012         LACERATION REPAIR  10/10/2023    ORTHOPEDIC SURGERY  2010    left hip replacement    TREADMILL STRESS TEST, W MD SUPERVISION AND REPORT  2007    UPPER GI ENDOSCOPY,BIOPSY  6/10/2019          Family History   Problem Relation Age of Onset    Hypertension Mother     Diabetes Mother     Stroke Mother      Social History     Socioeconomic History    Marital status:      Spouse name: None    Number of children: None    Years of education: None    Highest education level: None   Tobacco Use    Smoking status: Never    Smokeless tobacco: Never   Vaping Use    Vaping Use: Never used   Substance and Sexual Activity    Alcohol use: No    Drug use: No    Sexual activity: Defer     Social Determinants of Health     Financial Resource Strain: Low Risk  (8/3/2023)    Overall Financial Resource Strain (CARDIA)     Difficulty of Paying Living Expenses: Not hard at all   Transportation Needs: Unknown (8/3/2023)    PRAPARE - Transportation     Lack of Transportation (Non-Medical): No   Housing Stability: Unknown

## 2024-02-14 NOTE — FLOWSHEET NOTE
02/14/24 1428   Wound 02/14/24 Pretibial Left;Proximal   Date First Assessed/Time First Assessed: 02/14/24 1338   Present on Original Admission: Yes  Wound Approximate Age at First Assessment (Weeks): 3 weeks  Primary Wound Type: Traumatic  Location: Pretibial  Wound Location Orientation: Left;Proximal   Dressing/Treatment   (hydrofera blue, gauze roll gauze, tape, single layer tube size E)   Offloading for Diabetic Foot Ulcers Offloading not ordered

## 2024-02-21 ENCOUNTER — HOSPITAL ENCOUNTER (OUTPATIENT)
Facility: HOSPITAL | Age: 89
Discharge: HOME | End: 2024-02-21
Attending: FAMILY MEDICINE
Payer: MEDICARE

## 2024-02-21 VITALS
SYSTOLIC BLOOD PRESSURE: 142 MMHG | RESPIRATION RATE: 16 BRPM | DIASTOLIC BLOOD PRESSURE: 85 MMHG | TEMPERATURE: 98 F | HEART RATE: 82 BPM

## 2024-02-21 DIAGNOSIS — L97.222 VARICOSE VEINS OF LEFT LOWER EXTREMITY WITH INFLAMMATION, WITH ULCER OF CALF WITH FAT LAYER EXPOSED (HCC): ICD-10-CM

## 2024-02-21 DIAGNOSIS — L97.822 ULCER OF LEFT SHIN WITH FAT LAYER EXPOSED (HCC): Primary | ICD-10-CM

## 2024-02-21 DIAGNOSIS — I83.222 VARICOSE VEINS OF LEFT LOWER EXTREMITY WITH INFLAMMATION, WITH ULCER OF CALF WITH FAT LAYER EXPOSED (HCC): ICD-10-CM

## 2024-02-21 PROCEDURE — 11042 DBRDMT SUBQ TIS 1ST 20SQCM/<: CPT

## 2024-02-21 RX ORDER — IBUPROFEN 200 MG
TABLET ORAL ONCE
OUTPATIENT
Start: 2024-02-21 | End: 2024-02-21

## 2024-02-21 RX ORDER — CLOBETASOL PROPIONATE 0.5 MG/G
OINTMENT TOPICAL ONCE
OUTPATIENT
Start: 2024-02-21 | End: 2024-02-21

## 2024-02-21 RX ORDER — LIDOCAINE HYDROCHLORIDE 20 MG/ML
JELLY TOPICAL ONCE
OUTPATIENT
Start: 2024-02-21 | End: 2024-02-21

## 2024-02-21 RX ORDER — BACITRACIN ZINC AND POLYMYXIN B SULFATE 500; 1000 [USP'U]/G; [USP'U]/G
OINTMENT TOPICAL ONCE
OUTPATIENT
Start: 2024-02-21 | End: 2024-02-21

## 2024-02-21 RX ORDER — GINSENG 100 MG
CAPSULE ORAL ONCE
OUTPATIENT
Start: 2024-02-21 | End: 2024-02-21

## 2024-02-21 RX ORDER — SODIUM CHLOR/HYPOCHLOROUS ACID 0.033 %
SOLUTION, IRRIGATION IRRIGATION ONCE
OUTPATIENT
Start: 2024-02-21 | End: 2024-02-21

## 2024-02-21 RX ORDER — GENTAMICIN SULFATE 1 MG/G
OINTMENT TOPICAL ONCE
OUTPATIENT
Start: 2024-02-21 | End: 2024-02-21

## 2024-02-21 RX ORDER — TRIAMCINOLONE ACETONIDE 1 MG/G
OINTMENT TOPICAL ONCE
OUTPATIENT
Start: 2024-02-21 | End: 2024-02-21

## 2024-02-21 RX ORDER — LIDOCAINE HYDROCHLORIDE 40 MG/ML
SOLUTION TOPICAL ONCE
OUTPATIENT
Start: 2024-02-21 | End: 2024-02-21

## 2024-02-21 RX ORDER — LIDOCAINE 40 MG/G
CREAM TOPICAL ONCE
OUTPATIENT
Start: 2024-02-21 | End: 2024-02-21

## 2024-02-21 RX ORDER — LIDOCAINE 50 MG/G
OINTMENT TOPICAL ONCE
OUTPATIENT
Start: 2024-02-21 | End: 2024-02-21

## 2024-02-21 RX ORDER — BETAMETHASONE DIPROPIONATE 0.5 MG/G
CREAM TOPICAL ONCE
OUTPATIENT
Start: 2024-02-21 | End: 2024-02-21

## 2024-02-21 NOTE — PROGRESS NOTES
Post-Procedure Width (cm) 1.3 cm 02/21/24 1403   Post-Procedure Depth (cm) 0.2 cm 02/21/24 1403   Post-Procedure Surface Area (cm^2) 3.9 cm^2 02/21/24 1403   Post-Procedure Volume (cm^3) 0.78 cm^3 02/21/24 1403   Wound Assessment Pink/red;Epithelialization;Other (Comment) 02/21/24 1342   Drainage Amount Small (< 25%) 02/21/24 1342   Drainage Description Serous 02/21/24 1342   Odor None 02/21/24 1342   Fabiola-wound Assessment Dry/flaky 02/21/24 1342   Margins Flat/open edges 02/21/24 1342   Wound Thickness Description not for Pressure Injury Partial thickness 02/21/24 1342   Number of days: 7          -------    Past Medical History:   Diagnosis Date    Arthritis     GERD (gastroesophageal reflux disease)     Hx-TIA (transient ischemic attack) 2006    Hypercholesterolemia     Osteoporosis     Other ill-defined conditions(799.89)     high cholesterol    S/P colonoscopy 2001     Past Surgical History:   Procedure Laterality Date    COLONOSCOPY FLX DX W/COLLJ SPEC WHEN PFRMD  5/25/2012         LACERATION REPAIR  10/10/2023    ORTHOPEDIC SURGERY  2010    left hip replacement    TREADMILL STRESS TEST, W MD SUPERVISION AND REPORT  2007    UPPER GI ENDOSCOPY,BIOPSY  6/10/2019          Family History   Problem Relation Age of Onset    Hypertension Mother     Diabetes Mother     Stroke Mother      Social History     Socioeconomic History    Marital status:      Spouse name: None    Number of children: None    Years of education: None    Highest education level: None   Tobacco Use    Smoking status: Never    Smokeless tobacco: Never   Vaping Use    Vaping Use: Never used   Substance and Sexual Activity    Alcohol use: No    Drug use: No    Sexual activity: Defer     Social Determinants of Health     Financial Resource Strain: Low Risk  (8/3/2023)    Overall Financial Resource Strain (CARDIA)     Difficulty of Paying Living Expenses: Not hard at all   Transportation Needs: Unknown (8/3/2023)    PRAPARE - Transportation

## 2024-02-21 NOTE — FLOWSHEET NOTE
02/21/24 1342   Anesthetic   Anesthetic 4% Lidocaine Liquid Topical   Left Leg Edema Point of Measurement   Leg circumference 29 cm   Ankle circumference 20 cm   Compression Therapy Tubular elastic support bandage   Tubular Elastic Support Bandage Compression Pressure Medium   Peripheral Vascular   Edema None   RLE Neurovascular Assessment   Capillary Refill Less than/Equal to 3 seconds   Color Appropriate for Ethnicity   Temperature Cool   RLE Sensation  Full sensation   R Pedal Pulse +2   LLE Neurovascular Assessment   Capillary Refill Less than/Equal to 3 seconds   Color Appropriate for Ethnicity   Temperature Cool   LLE Sensation  Full sensation   L Pedal Pulse +2   Wound 02/14/24 Pretibial Left;Proximal   Date First Assessed/Time First Assessed: 02/14/24 1338   Present on Original Admission: Yes  Wound Approximate Age at First Assessment (Weeks): 3 weeks  Primary Wound Type: Traumatic  Location: Pretibial  Wound Location Orientation: Left;Proximal   Wound Image    Wound Etiology Traumatic   Dressing Status Old drainage noted   Offloading for Diabetic Foot Ulcers Offloading not required   Wound Length (cm) 3 cm   Wound Width (cm) 0.3 cm   Wound Depth (cm) 0.1 cm   Wound Surface Area (cm^2) 0.9 cm^2   Change in Wound Size % (l*w) 86.11   Wound Volume (cm^3) 0.09 cm^3   Wound Healing % 86   Wound Assessment Pink/red;Epithelialization   Drainage Amount Small (< 25%)   Drainage Description Serous   Odor None   Fabiola-wound Assessment Dry/flaky   Margins Flat/open edges   Wound Thickness Description not for Pressure Injury Partial thickness   Pain Assessment   Pain Assessment None - Denies Pain     BP (!) 142/85   Pulse 82   Temp 98 °F (36.7 °C) (Temporal)   Resp 16

## 2024-02-21 NOTE — PATIENT INSTRUCTIONS
Discharge Instructions/Wound Orders  [facility]  [address] [address]  Telephone: [phone]    FAX [phone]    NAME:  Gemma Mcgee  YOB: 1932  MEDICAL RECORD NUMBER:  [id number]  DATE:  February 21, 2024    Wound Care Orders:  Left leg wound - Cleanse with baby shampoo. Rinse and pat dry. Apply moisés to wound bed. Cover with hydrofera blue ready. Cover with gauze and roll gauze. Secure with tape. Apply single layer tubigrip size \"E\". Change weekly in clinic.        Dietary:  [x] Diet as tolerated      [] Diabetic Diet            [] Increase Protein: examples (Meat, cheese, eggs, greek yogurt, fish, nuts)          [] Jabari Therapeutic Nutrition Powder       Return Appointment:  [x] Return Appointment: With Dr. Linnea Crane in 1 week  [] Ordered tests:      Electronically signed Chani Espana RN on 2/21/2024 at 1:56 PM     Wound Care Center Information: Should you experience any significant changes in your wound(s) or have questions about your wound care, please contact the [facility] at MONDAY - FRIDAY 8:00 am - 4:30.  If you need help with your wound outside these hours and cannot wait until we are again available, contact your PCP or go to the hospital emergency room.     PLEASE NOTE: IF YOU ARE UNABLE TO OBTAIN WOUND SUPPLIES, CONTINUE TO USE THE SUPPLIES YOU HAVE AVAILABLE UNTIL YOU ARE ABLE TO REACH US. IT IS MOST IMPORTANT TO KEEP THE WOUND COVERED AT ALL TIMES.     Physician Signature:_______________________    Date: ___________ Time:  ____________

## 2024-02-21 NOTE — FLOWSHEET NOTE
02/21/24 1425   Left Leg Edema Point of Measurement   Compression Therapy Tubular elastic support bandage   Tubular Elastic Support Bandage Compression Pressure Low   Wound 02/14/24 Pretibial Left;Proximal   Date First Assessed/Time First Assessed: 02/14/24 1338   Present on Original Admission: Yes  Wound Approximate Age at First Assessment (Weeks): 3 weeks  Primary Wound Type: Traumatic  Location: Pretibial  Wound Location Orientation: Left;Proximal   Dressing/Treatment   (moisés, hydrofera blue, gauze, tape, tubi  F)   Offloading for Diabetic Foot Ulcers Offloading not ordered

## 2024-02-28 ENCOUNTER — HOSPITAL ENCOUNTER (OUTPATIENT)
Facility: HOSPITAL | Age: 89
Discharge: HOME | End: 2024-02-28
Attending: FAMILY MEDICINE
Payer: MEDICARE

## 2024-02-28 VITALS
TEMPERATURE: 98 F | HEART RATE: 71 BPM | RESPIRATION RATE: 16 BRPM | SYSTOLIC BLOOD PRESSURE: 147 MMHG | DIASTOLIC BLOOD PRESSURE: 82 MMHG

## 2024-02-28 DIAGNOSIS — L97.822 ULCER OF LEFT SHIN WITH FAT LAYER EXPOSED (HCC): Primary | ICD-10-CM

## 2024-02-28 DIAGNOSIS — I83.222 VARICOSE VEINS OF LEFT LOWER EXTREMITY WITH INFLAMMATION, WITH ULCER OF CALF WITH FAT LAYER EXPOSED (HCC): ICD-10-CM

## 2024-02-28 DIAGNOSIS — L97.222 VARICOSE VEINS OF LEFT LOWER EXTREMITY WITH INFLAMMATION, WITH ULCER OF CALF WITH FAT LAYER EXPOSED (HCC): ICD-10-CM

## 2024-02-28 PROCEDURE — 99212 OFFICE O/P EST SF 10 MIN: CPT

## 2024-02-28 RX ORDER — IBUPROFEN 200 MG
TABLET ORAL ONCE
Status: CANCELLED | OUTPATIENT
Start: 2024-02-28 | End: 2024-02-28

## 2024-02-28 RX ORDER — GINSENG 100 MG
CAPSULE ORAL ONCE
Status: CANCELLED | OUTPATIENT
Start: 2024-02-28 | End: 2024-02-28

## 2024-02-28 RX ORDER — GENTAMICIN SULFATE 1 MG/G
OINTMENT TOPICAL ONCE
Status: CANCELLED | OUTPATIENT
Start: 2024-02-28 | End: 2024-02-28

## 2024-02-28 RX ORDER — SODIUM CHLOR/HYPOCHLOROUS ACID 0.033 %
SOLUTION, IRRIGATION IRRIGATION ONCE
Status: CANCELLED | OUTPATIENT
Start: 2024-02-28 | End: 2024-02-28

## 2024-02-28 RX ORDER — LIDOCAINE 40 MG/G
CREAM TOPICAL ONCE
Status: CANCELLED | OUTPATIENT
Start: 2024-02-28 | End: 2024-02-28

## 2024-02-28 RX ORDER — LIDOCAINE HYDROCHLORIDE 40 MG/ML
SOLUTION TOPICAL ONCE
Status: CANCELLED | OUTPATIENT
Start: 2024-02-28 | End: 2024-02-28

## 2024-02-28 RX ORDER — TRIAMCINOLONE ACETONIDE 1 MG/G
OINTMENT TOPICAL ONCE
Status: CANCELLED | OUTPATIENT
Start: 2024-02-28 | End: 2024-02-28

## 2024-02-28 RX ORDER — LIDOCAINE 50 MG/G
OINTMENT TOPICAL ONCE
Status: CANCELLED | OUTPATIENT
Start: 2024-02-28 | End: 2024-02-28

## 2024-02-28 RX ORDER — BETAMETHASONE DIPROPIONATE 0.5 MG/G
CREAM TOPICAL ONCE
Status: CANCELLED | OUTPATIENT
Start: 2024-02-28 | End: 2024-02-28

## 2024-02-28 RX ORDER — LIDOCAINE HYDROCHLORIDE 20 MG/ML
JELLY TOPICAL ONCE
Status: CANCELLED | OUTPATIENT
Start: 2024-02-28 | End: 2024-02-28

## 2024-02-28 RX ORDER — BACITRACIN ZINC AND POLYMYXIN B SULFATE 500; 1000 [USP'U]/G; [USP'U]/G
OINTMENT TOPICAL ONCE
Status: CANCELLED | OUTPATIENT
Start: 2024-02-28 | End: 2024-02-28

## 2024-02-28 RX ORDER — CLOBETASOL PROPIONATE 0.5 MG/G
OINTMENT TOPICAL ONCE
Status: CANCELLED | OUTPATIENT
Start: 2024-02-28 | End: 2024-02-28

## 2024-02-28 NOTE — PROGRESS NOTES
Wound Center  Progress Note     Chief Complaint:  Gemma Mcgee is a 89 y.o.  female  with L lower leg anterior wound of >1 month duration.        Assessment/Plan     89 y.o. female with     -L lower leg anterior chronic ulcer.  S/p trauma; cluster of 3  Primarily partial thickness, one is Full thickness  healed    -Left foot  Fading bruise, not open, not tender  Should fade overtime    Patient/ dtr understood and agrees with plan. Questions answered.      Follow up prn    Subjective:     Since last visit  Has mild bruise on top left foot between great toe and second toe.  Looks better today  Didn't hit in anywhere or drop anything    HPI:     Bumped into car door 3 weeks ago, being managed by pcp until now  Has had keflex for 10 days  Then last fri stared on doxy for 10 days and referred here  Looks a lot better  Using bacitracin      History/Chart/Medications reviewed    Wound caused by: venous/trauma  Current wound care:See flowsheet  Offloading wound: Yes  Appetite: fair  Wound associated pain: See flowsheet  Diabetic: no  Smoker: no  ROS: no N/V/D, no T/chills; no local rash      Objective:     Physical Exam:   See flowsheet / nursing notes for vitals  Vitals:    02/28/24 1438   BP: (!) 147/82   Pulse: 71   Resp: 16   Temp: 98 °F (36.7 °C)     General: NAD. Hygiene good  Psych: cooperative. No anxiety or depression. Normal mood and affect.  Neuro: alert and oriented to person/place/situation. Otherwise nonfocal.  Derm: Normal  turgor for age, dry skin  HEENT: Normocephalic, atraumatic. EOMI. Conjunctiva clear. No scleral icterus.  Neck: Normal range of motion.   Chest: Respirations nonlabored  Lower extremities: color normal; temperature normal. Hair growth is not present. Calves are supple, nontender, approximately equally sized in comparison. Capillary refill <3 sec. Vaircosities  Focussed Lower Extremity Exam:  Vascular exam:  RIGHT lower extremity: mild edema, foot warm,   DP pulse : 2+  PT pulse:

## 2024-02-28 NOTE — FLOWSHEET NOTE
02/28/24 1440   Anesthetic   Anesthetic 4% Lidocaine Cream   Left Leg Edema Point of Measurement   Leg circumference 28 cm   Ankle circumference 20 cm   Compression Therapy Tubular elastic support bandage   Tubular Elastic Support Bandage Compression Pressure Low   LLE Neurovascular Assessment   Capillary Refill Greater than 3 seconds   Color Appropriate for Ethnicity   Temperature Cold   LLE Sensation  Full sensation   L Pedal Pulse +1   Wound 02/14/24 Pretibial Left;Proximal   Date First Assessed/Time First Assessed: 02/14/24 1338   Present on Original Admission: Yes  Wound Approximate Age at First Assessment (Weeks): 3 weeks  Primary Wound Type: Traumatic  Location: Pretibial  Wound Location Orientation: Left;Proximal   Wound Image    Wound Etiology Traumatic   Dressing Status Intact;Old drainage noted   Wound Cleansed Cleansed with saline   Offloading for Diabetic Foot Ulcers Offloading not required   Wound Length (cm) 3 cm   Wound Width (cm) 0.3 cm   Wound Depth (cm) 0.1 cm   Wound Surface Area (cm^2) 0.9 cm^2   Change in Wound Size % (l*w) 86.11   Wound Volume (cm^3) 0.09 cm^3   Wound Healing % 86   Wound Assessment Pink/red  (dried blood)   Drainage Amount Scant (moist but unmeasurable)   Drainage Description Serous   Odor None   Fabiola-wound Assessment Dry/flaky;Blanchable erythema   Margins Flat/open edges   Pain Assessment   Pain Assessment None - Denies Pain     BP (!) 147/82   Pulse 71   Temp 98 °F (36.7 °C) (Tympanic)   Resp 16

## 2024-02-28 NOTE — FLOWSHEET NOTE
02/28/24 1619   Left Leg Edema Point of Measurement   Compression Therapy Tubular elastic support bandage   [REMOVED] Wound 02/14/24 Pretibial Left;Proximal   Final Assessment Date/Final Assessment Time: 02/28/24 1503  Date First Assessed/Time First Assessed: 02/14/24 1338   Present on Original Admission: Yes  Wound Approximate Age at First Assessment (Weeks): 3 weeks  Primary Wound Type: Traumatic  Locatio...   Dressing/Treatment   (vaseline, tubi )   Offloading for Diabetic Foot Ulcers Offloading not ordered

## 2024-02-28 NOTE — PATIENT INSTRUCTIONS
Discharge Instructions/Wound Orders  [facility]  [address] [address]  Telephone: [phone]    FAX [phone]    NAME:  Gemma Mcgee  YOB: 1932  MEDICAL RECORD NUMBER:  [id number]  DATE:  February 28, 2024    Wound Care Orders:  Left leg wound - Cleanse with baby shampoo. Rinse and pat dry. Moisturize with A&D or vaseline multiple times a day. Wear your compression stockings daily.     Place single layer tubigrip size E in clinic today.   Dietary:  [x] Diet as tolerated      [] Diabetic Diet            [] Increase Protein: examples (Meat, cheese, eggs, greek yogurt, fish, nuts)          [] Jabari Therapeutic Nutrition Powder       Return Appointment:  [x] Return Appointment: With Dr. Linnea Crane as needed.   [] Ordered tests:      Electronically signed LIZZY WEBB RN on 2/28/2024 at 2:43 PM     Wound Care Center Information: Should you experience any significant changes in your wound(s) or have questions about your wound care, please contact the [facility] at MONDAY - FRIDAY 8:00 am - 4:30.  If you need help with your wound outside these hours and cannot wait until we are again available, contact your PCP or go to the hospital emergency room.     PLEASE NOTE: IF YOU ARE UNABLE TO OBTAIN WOUND SUPPLIES, CONTINUE TO USE THE SUPPLIES YOU HAVE AVAILABLE UNTIL YOU ARE ABLE TO REACH US. IT IS MOST IMPORTANT TO KEEP THE WOUND COVERED AT ALL TIMES.     Physician Signature:_______________________    Date: ___________ Time:  ____________

## 2024-03-18 DIAGNOSIS — G62.9 NEUROPATHY: ICD-10-CM

## 2024-03-18 NOTE — TELEPHONE ENCOUNTER
Chief Complaint   Patient presents with    Medication Refill       Requested Prescriptions     Pending Prescriptions Disp Refills    gabapentin (NEURONTIN) 300 MG capsule 90 capsule 0     Sig: Take 1 capsule by mouth nightly for 90 days. Max Daily Amount: 300 mg       Allergies:  Allergies   Allergen Reactions    Atorvastatin Myalgia    Ezetimibe Other (See Comments)     itching    Pravastatin Myalgia       Last visit with clinic:  2/9/2024   Next visit with clinic: 5/15/2024     Last visit with this provider: 2/9/2024   Next Visit with this provider: 5/15/2024    Signed by Heriberto Fitch CCT  03/18/24  2:55 PM

## 2024-03-18 NOTE — TELEPHONE ENCOUNTER
PCP: Bell Rodriguez MD     Last appt:  2/9/2024      Future Appointments   Date Time Provider Department Center   5/15/2024 10:30 AM Bell Rodriguez MD [facility] AMB          Requested Prescriptions     Pending Prescriptions Disp Refills    VITAMIN D3 25 MCG (1000 UT) TABS tablet [Pharmacy Med Name: VITAMIN D3 25 MCG (1000 UT) Tablet] 180 tablet 3     Sig: TAKE 2 TABLETS EVERY DAY

## 2024-03-19 RX ORDER — GABAPENTIN 300 MG/1
300 CAPSULE ORAL
Qty: 90 CAPSULE | Refills: 0 | Status: SHIPPED | OUTPATIENT
Start: 2024-03-19 | End: 2024-03-20 | Stop reason: SDUPTHER

## 2024-03-19 RX ORDER — MELATONIN
2 DAILY
Qty: 180 TABLET | Refills: 3 | Status: SHIPPED | OUTPATIENT
Start: 2024-03-19

## 2024-03-20 DIAGNOSIS — G62.9 NEUROPATHY: ICD-10-CM

## 2024-03-20 RX ORDER — GABAPENTIN 300 MG/1
300 CAPSULE ORAL
Qty: 90 CAPSULE | Refills: 1 | Status: SHIPPED | OUTPATIENT
Start: 2024-03-20 | End: 2024-06-18

## 2024-03-20 NOTE — TELEPHONE ENCOUNTER
RX was sent to local, should have been mail order. RX canceled for local.  PCP: Bell Rodriguez MD     Last appt: 2/9/2024    Future Appointments   Date Time Provider Department Center   5/15/2024 10:30 AM Bell Rodriguez MD [facility] AMB          Requested Prescriptions     Pending Prescriptions Disp Refills    gabapentin (NEURONTIN) 300 MG capsule 90 capsule 0     Sig: Take 1 capsule by mouth nightly for 90 days. Max Daily Amount: 300 mg

## 2024-05-06 DIAGNOSIS — E55.9 VITAMIN D DEFICIENCY: ICD-10-CM

## 2024-05-06 DIAGNOSIS — E78.00 HYPERCHOLESTEROLEMIA: ICD-10-CM

## 2024-05-08 LAB
ALBUMIN SERPL-MCNC: 4.3 G/DL (ref 3.6–4.6)
ALBUMIN/GLOB SERPL: 1.8 {RATIO} (ref 1.2–2.2)
ALP SERPL-CCNC: 53 IU/L (ref 44–121)
ALT SERPL-CCNC: 14 IU/L (ref 0–32)
AST SERPL-CCNC: 21 IU/L (ref 0–40)
BILIRUB SERPL-MCNC: 0.4 MG/DL (ref 0–1.2)
BUN SERPL-MCNC: 17 MG/DL (ref 10–36)
BUN/CREAT SERPL: 18 (ref 12–28)
CALCIUM SERPL-MCNC: 8.9 MG/DL (ref 8.7–10.3)
CHLORIDE SERPL-SCNC: 104 MMOL/L (ref 96–106)
CO2 SERPL-SCNC: 26 MMOL/L (ref 20–29)
CREAT SERPL-MCNC: 0.92 MG/DL (ref 0.57–1)
EGFRCR SERPLBLD CKD-EPI 2021: 58 ML/MIN/1.73
GLOBULIN SER CALC-MCNC: 2.4 G/DL (ref 1.5–4.5)
GLUCOSE SERPL-MCNC: 98 MG/DL (ref 70–99)
POTASSIUM SERPL-SCNC: 4.7 MMOL/L (ref 3.5–5.2)
PROT SERPL-MCNC: 6.7 G/DL (ref 6–8.5)
SODIUM SERPL-SCNC: 141 MMOL/L (ref 134–144)

## 2024-05-09 LAB
25(OH)D3+25(OH)D2 SERPL-MCNC: 61.3 NG/ML (ref 30–100)
CHOLEST SERPL-MCNC: 183 MG/DL (ref 100–199)
HDLC SERPL-MCNC: 72 MG/DL
LDLC SERPL CALC-MCNC: 98 MG/DL (ref 0–99)
TRIGL SERPL-MCNC: 70 MG/DL (ref 0–149)
VLDLC SERPL CALC-MCNC: 13 MG/DL (ref 5–40)

## 2024-05-15 ENCOUNTER — OFFICE VISIT (OUTPATIENT)
Facility: CLINIC | Age: 89
End: 2024-05-15
Payer: MEDICARE

## 2024-05-15 VITALS
OXYGEN SATURATION: 98 % | RESPIRATION RATE: 16 BRPM | BODY MASS INDEX: 19.43 KG/M2 | SYSTOLIC BLOOD PRESSURE: 138 MMHG | DIASTOLIC BLOOD PRESSURE: 80 MMHG | WEIGHT: 105.6 LBS | HEART RATE: 83 BPM | HEIGHT: 62 IN | TEMPERATURE: 97.1 F

## 2024-05-15 DIAGNOSIS — K21.9 GASTROESOPHAGEAL REFLUX DISEASE, UNSPECIFIED WHETHER ESOPHAGITIS PRESENT: ICD-10-CM

## 2024-05-15 DIAGNOSIS — K59.04 CHRONIC IDIOPATHIC CONSTIPATION: ICD-10-CM

## 2024-05-15 DIAGNOSIS — E78.00 HYPERCHOLESTEROLEMIA: ICD-10-CM

## 2024-05-15 DIAGNOSIS — S63.602A SPRAIN OF LEFT THUMB, UNSPECIFIED SITE OF DIGIT, INITIAL ENCOUNTER: ICD-10-CM

## 2024-05-15 DIAGNOSIS — Z97.4 WEARS HEARING AID: ICD-10-CM

## 2024-05-15 DIAGNOSIS — R13.10 DYSPHAGIA, UNSPECIFIED TYPE: ICD-10-CM

## 2024-05-15 DIAGNOSIS — Z00.00 MEDICARE ANNUAL WELLNESS VISIT, SUBSEQUENT: Primary | ICD-10-CM

## 2024-05-15 PROCEDURE — 1123F ACP DISCUSS/DSCN MKR DOCD: CPT | Performed by: INTERNAL MEDICINE

## 2024-05-15 PROCEDURE — G0439 PPPS, SUBSEQ VISIT: HCPCS | Performed by: INTERNAL MEDICINE

## 2024-05-15 PROCEDURE — 1090F PRES/ABSN URINE INCON ASSESS: CPT | Performed by: INTERNAL MEDICINE

## 2024-05-15 PROCEDURE — 1036F TOBACCO NON-USER: CPT | Performed by: INTERNAL MEDICINE

## 2024-05-15 PROCEDURE — G8427 DOCREV CUR MEDS BY ELIG CLIN: HCPCS | Performed by: INTERNAL MEDICINE

## 2024-05-15 PROCEDURE — 99214 OFFICE O/P EST MOD 30 MIN: CPT | Performed by: INTERNAL MEDICINE

## 2024-05-15 PROCEDURE — G8420 CALC BMI NORM PARAMETERS: HCPCS | Performed by: INTERNAL MEDICINE

## 2024-05-15 RX ORDER — FAMOTIDINE 40 MG/1
40 TABLET, FILM COATED ORAL EVERY EVENING
Qty: 30 TABLET | Refills: 3 | Status: SHIPPED | OUTPATIENT
Start: 2024-05-15

## 2024-05-15 RX ORDER — FAMOTIDINE 40 MG/1
40 TABLET, FILM COATED ORAL EVERY EVENING
Qty: 90 TABLET | Refills: 3 | Status: SHIPPED | OUTPATIENT
Start: 2024-05-15

## 2024-05-15 ASSESSMENT — PATIENT HEALTH QUESTIONNAIRE - PHQ9
8. MOVING OR SPEAKING SO SLOWLY THAT OTHER PEOPLE COULD HAVE NOTICED. OR THE OPPOSITE, BEING SO FIGETY OR RESTLESS THAT YOU HAVE BEEN MOVING AROUND A LOT MORE THAN USUAL: NOT AT ALL
4. FEELING TIRED OR HAVING LITTLE ENERGY: NOT AT ALL
1. LITTLE INTEREST OR PLEASURE IN DOING THINGS: SEVERAL DAYS
10. IF YOU CHECKED OFF ANY PROBLEMS, HOW DIFFICULT HAVE THESE PROBLEMS MADE IT FOR YOU TO DO YOUR WORK, TAKE CARE OF THINGS AT HOME, OR GET ALONG WITH OTHER PEOPLE: NOT DIFFICULT AT ALL
5. POOR APPETITE OR OVEREATING: NOT AT ALL
7. TROUBLE CONCENTRATING ON THINGS, SUCH AS READING THE NEWSPAPER OR WATCHING TELEVISION: NOT AT ALL
SUM OF ALL RESPONSES TO PHQ QUESTIONS 1-9: 2
SUM OF ALL RESPONSES TO PHQ QUESTIONS 1-9: 2
3. TROUBLE FALLING OR STAYING ASLEEP: NOT AT ALL
6. FEELING BAD ABOUT YOURSELF - OR THAT YOU ARE A FAILURE OR HAVE LET YOURSELF OR YOUR FAMILY DOWN: NOT AT ALL
SUM OF ALL RESPONSES TO PHQ QUESTIONS 1-9: 2
9. THOUGHTS THAT YOU WOULD BE BETTER OFF DEAD, OR OF HURTING YOURSELF: NOT AT ALL
SUM OF ALL RESPONSES TO PHQ QUESTIONS 1-9: 2
SUM OF ALL RESPONSES TO PHQ9 QUESTIONS 1 & 2: 2
2. FEELING DOWN, DEPRESSED OR HOPELESS: SEVERAL DAYS

## 2024-05-15 ASSESSMENT — LIFESTYLE VARIABLES
HOW OFTEN DO YOU HAVE A DRINK CONTAINING ALCOHOL: NEVER
HOW MANY STANDARD DRINKS CONTAINING ALCOHOL DO YOU HAVE ON A TYPICAL DAY: PATIENT DOES NOT DRINK

## 2024-05-15 NOTE — PATIENT INSTRUCTIONS

## 2024-05-15 NOTE — PROGRESS NOTES
Gemma Mcgee is a 89 y.o. female    Chief Complaint   Patient presents with    Medicare AWV       BP (!) 145/78   Pulse 83   Temp 97.1 °F (36.2 °C)   Resp 16   Ht 1.575 m (5' 2\")   Wt 47.9 kg (105 lb 9.6 oz)   SpO2 98%   BMI 19.31 kg/m²         1. Have you been to the ER, urgent care clinic since your last visit?  Hospitalized since your last visit? No    2. Have you seen or consulted any other health care providers outside of the [facility] since your last visit?  Include any pap smears or colon screening. No    Learning Assessment:      2023     8:20 AM   [facility] AMB LEARNING ASSESSMENT   Primary Learner Patient   level of education DID NOT GRADUATE HIGH SCHOOL   Barriers Factors NONE   co-learner caregiver No   Primary Language ENGLISH   Learning Preference DEMONSTRATION   Answered By patient   Relationship to Learner SELF       Fall Risk Assessment:      5/15/2024    10:30 AM 8/3/2023     9:07 AM 3/8/2023    11:12 AM 2022    10:15 AM 2021     9:00 AM 2021     3:28 PM 2021     3:03 PM   Amb Fall Risk Assessment and TUG Test   Do you feel unsteady or are you worried about falling?  no no        2 or more falls in past year? no no        Fall with injury in past year? no no        Fall in past 12 months?   0 0 0 0 0   Able to walk?   Yes Yes Yes Yes Yes       Abuse Screenin/3/2023     8:00 AM   AMB Abuse Screening   Do you ever feel afraid of your partner? N   Are you in a relationship with someone who physically or mentally threatens you? N   Is it safe for you to go home? Y       ADL Screenin/14/2023     8:00 AM   ADL ASSESSMENT   Feeding yourself No Help Needed   Getting from bed to chair No Help Needed   Getting dressed No Help Needed   Bathing or showering No Help Needed   Walk across the room (includes cane/walker) No Help Needed   Using the telphone No Help Needed   Taking your medications No Help Needed   Preparing meals No Help Needed 
(PRESERVISION AREDS PO) Take by mouth Yes ProviderTaylor MD   polyethylene glycol (GLYCOLAX) 17 GM/SCOOP powder Take by mouth Yes Automatic Reconciliation, Ar   psyllium (KONSYL) 28.3 % PACK Take 1 packet by mouth daily  Patient not taking: Reported on 2/14/2024  ProviderTaylor MD       CareTeam (Including outside providers/suppliers regularly involved in providing care):   Patient Care Team:  Bell Rodriguez MD as PCP - General  [facility]     Reviewed and updated this visit:  Tobacco  Allergies  Meds  Problems  Med Hx  Surg Hx  Soc Hx  Fam Hx

## 2024-05-22 ENCOUNTER — TELEPHONE (OUTPATIENT)
Facility: CLINIC | Age: 89
End: 2024-05-22

## 2024-05-22 NOTE — TELEPHONE ENCOUNTER
Pt daughter called to state provider told her to take famotidine (PEPCID) 40 MG tablet in the morning but bottle says evening and wants to know which one it is

## 2024-10-08 DIAGNOSIS — G62.9 NEUROPATHY: ICD-10-CM

## 2024-10-08 DIAGNOSIS — K21.9 GASTROESOPHAGEAL REFLUX DISEASE, UNSPECIFIED WHETHER ESOPHAGITIS PRESENT: Primary | ICD-10-CM

## 2024-10-08 RX ORDER — PRAVASTATIN SODIUM 20 MG
TABLET ORAL
Qty: 90 TABLET | Refills: 3 | Status: SHIPPED | OUTPATIENT
Start: 2024-10-08

## 2024-10-08 RX ORDER — PANTOPRAZOLE SODIUM 40 MG/1
TABLET, DELAYED RELEASE ORAL
Qty: 90 TABLET | Refills: 3 | Status: SHIPPED | OUTPATIENT
Start: 2024-10-08

## 2024-10-08 RX ORDER — GABAPENTIN 300 MG/1
CAPSULE ORAL
Qty: 90 CAPSULE | Refills: 0 | Status: SHIPPED | OUTPATIENT
Start: 2024-10-08 | End: 2025-01-06

## 2024-11-06 ENCOUNTER — TELEPHONE (OUTPATIENT)
Facility: CLINIC | Age: 89
End: 2024-11-06

## 2024-11-06 NOTE — TELEPHONE ENCOUNTER
Pt daughter called and wanted to know if the pt ever received the pneumonia vaccine and also if the pt should get that one and the RSV vaccine

## 2024-12-03 ENCOUNTER — TELEPHONE (OUTPATIENT)
Facility: CLINIC | Age: 88
End: 2024-12-03

## 2024-12-03 DIAGNOSIS — E55.9 VITAMIN D DEFICIENCY: Primary | ICD-10-CM

## 2024-12-03 DIAGNOSIS — G62.9 NEUROPATHY: ICD-10-CM

## 2024-12-03 DIAGNOSIS — E55.9 VITAMIN D DEFICIENCY: ICD-10-CM

## 2024-12-03 NOTE — TELEPHONE ENCOUNTER
Pt daughter stated pt would like routine labs before next appointment if possible, please call back

## 2024-12-07 LAB
25(OH)D3+25(OH)D2 SERPL-MCNC: 54.4 NG/ML (ref 30–100)
BUN SERPL-MCNC: 14 MG/DL (ref 10–36)
BUN/CREAT SERPL: 16 (ref 12–28)
CALCIUM SERPL-MCNC: 9.2 MG/DL (ref 8.7–10.3)
CHLORIDE SERPL-SCNC: 104 MMOL/L (ref 96–106)
CO2 SERPL-SCNC: 27 MMOL/L (ref 20–29)
CREAT SERPL-MCNC: 0.85 MG/DL (ref 0.57–1)
EGFRCR SERPLBLD CKD-EPI 2021: 64 ML/MIN/1.73
GLUCOSE SERPL-MCNC: 98 MG/DL (ref 70–99)
POTASSIUM SERPL-SCNC: 4.7 MMOL/L (ref 3.5–5.2)
SODIUM SERPL-SCNC: 143 MMOL/L (ref 134–144)

## 2024-12-11 ENCOUNTER — OFFICE VISIT (OUTPATIENT)
Facility: CLINIC | Age: 88
End: 2024-12-11
Payer: MEDICARE

## 2024-12-11 VITALS
RESPIRATION RATE: 12 BRPM | TEMPERATURE: 98.9 F | BODY MASS INDEX: 18.58 KG/M2 | OXYGEN SATURATION: 98 % | HEIGHT: 62 IN | WEIGHT: 101 LBS | HEART RATE: 82 BPM | SYSTOLIC BLOOD PRESSURE: 132 MMHG | DIASTOLIC BLOOD PRESSURE: 79 MMHG

## 2024-12-11 DIAGNOSIS — E55.9 VITAMIN D DEFICIENCY: ICD-10-CM

## 2024-12-11 DIAGNOSIS — K59.00 CONSTIPATION, UNSPECIFIED CONSTIPATION TYPE: Primary | ICD-10-CM

## 2024-12-11 DIAGNOSIS — K21.9 GASTROESOPHAGEAL REFLUX DISEASE, UNSPECIFIED WHETHER ESOPHAGITIS PRESENT: ICD-10-CM

## 2024-12-11 DIAGNOSIS — G62.9 NEUROPATHY: ICD-10-CM

## 2024-12-11 PROCEDURE — G8482 FLU IMMUNIZE ORDER/ADMIN: HCPCS | Performed by: INTERNAL MEDICINE

## 2024-12-11 PROCEDURE — G8427 DOCREV CUR MEDS BY ELIG CLIN: HCPCS | Performed by: INTERNAL MEDICINE

## 2024-12-11 PROCEDURE — 1090F PRES/ABSN URINE INCON ASSESS: CPT | Performed by: INTERNAL MEDICINE

## 2024-12-11 PROCEDURE — G8419 CALC BMI OUT NRM PARAM NOF/U: HCPCS | Performed by: INTERNAL MEDICINE

## 2024-12-11 PROCEDURE — 1159F MED LIST DOCD IN RCRD: CPT | Performed by: INTERNAL MEDICINE

## 2024-12-11 PROCEDURE — 1123F ACP DISCUSS/DSCN MKR DOCD: CPT | Performed by: INTERNAL MEDICINE

## 2024-12-11 PROCEDURE — 99214 OFFICE O/P EST MOD 30 MIN: CPT | Performed by: INTERNAL MEDICINE

## 2024-12-11 PROCEDURE — 1160F RVW MEDS BY RX/DR IN RCRD: CPT | Performed by: INTERNAL MEDICINE

## 2024-12-11 PROCEDURE — 1036F TOBACCO NON-USER: CPT | Performed by: INTERNAL MEDICINE

## 2024-12-11 RX ORDER — SENNA AND DOCUSATE SODIUM 50; 8.6 MG/1; MG/1
1-2 TABLET, FILM COATED ORAL DAILY
Qty: 60 TABLET | Refills: 2 | Status: SHIPPED | OUTPATIENT
Start: 2024-12-11

## 2024-12-11 RX ORDER — PREGABALIN 75 MG/1
75 CAPSULE ORAL
Qty: 30 CAPSULE | Refills: 0 | Status: SHIPPED | OUTPATIENT
Start: 2024-12-11 | End: 2025-01-10

## 2024-12-11 RX ORDER — PREGABALIN 75 MG/1
75 CAPSULE ORAL
Qty: 90 CAPSULE | Refills: 1 | Status: SHIPPED | OUTPATIENT
Start: 2024-12-11 | End: 2025-03-11

## 2024-12-11 SDOH — ECONOMIC STABILITY: FOOD INSECURITY: WITHIN THE PAST 12 MONTHS, YOU WORRIED THAT YOUR FOOD WOULD RUN OUT BEFORE YOU GOT MONEY TO BUY MORE.: NEVER TRUE

## 2024-12-11 SDOH — ECONOMIC STABILITY: INCOME INSECURITY: HOW HARD IS IT FOR YOU TO PAY FOR THE VERY BASICS LIKE FOOD, HOUSING, MEDICAL CARE, AND HEATING?: NOT HARD AT ALL

## 2024-12-11 SDOH — ECONOMIC STABILITY: FOOD INSECURITY: WITHIN THE PAST 12 MONTHS, THE FOOD YOU BOUGHT JUST DIDN'T LAST AND YOU DIDN'T HAVE MONEY TO GET MORE.: NEVER TRUE

## 2024-12-11 NOTE — PROGRESS NOTES
Gemma Mcgee  Identified pt with two pt identifiers(name and ).     Chief Complaint   Patient presents with    Gastroesophageal Reflux    Peripheral Neuropathy       Reviewed record In preparation for visit and have obtained necessary documentation.     1. Have you been to the ER, urgent care clinic or hospitalized since your last visit? No     2. Have you seen or consulted any other health care providers outside of the [facility] since your last visit? Include any pap smears or colon screening. No    Vitals reviewed with provider.    Health Maintenance reviewed:     Health Maintenance Due   Topic    Shingles vaccine (1 of 2)    Respiratory Syncytial Virus (RSV) Pregnant or age 60 yrs+ (1 - 1-dose 75+ series)    COVID-19 Vaccine ( season)          Wt Readings from Last 3 Encounters:   24 45.8 kg (101 lb)   05/15/24 47.9 kg (105 lb 9.6 oz)   24 47.6 kg (105 lb)        Temp Readings from Last 3 Encounters:   24 98.9 °F (37.2 °C) (Oral)   05/15/24 97.1 °F (36.2 °C)   24 98 °F (36.7 °C) (Tympanic)        BP Readings from Last 3 Encounters:   24 132/79   05/15/24 138/80   24 (!) 147/82        Pulse Readings from Last 3 Encounters:   24 82   05/15/24 83   24 71      [unfilled]       Learning Assessment:   :         2023     8:20 AM   [facility] AMB LEARNING ASSESSMENT   Primary Learner Patient   level of education DID NOT GRADUATE HIGH SCHOOL   Barriers Factors NONE   co-learner caregiver No   Primary Language ENGLISH   Learning Preference DEMONSTRATION   Answered By patient   Relationship to Learner SELF         Fall Risk Assessment:   :         5/15/2024    10:30 AM 8/3/2023     9:07 AM 3/8/2023    11:12 AM 2022    10:15 AM 2021     9:00 AM 2021     3:28 PM 2021     3:03 PM   Amb Fall Risk Assessment and TUG Test   Do you feel unsteady or are you worried about falling?  no no        2 or more falls in past year? no no      
reflux and peripheral neuropathy.    Diagnoses and all orders for this visit:    Constipation, unspecified constipation type  -     sennosides-docusate sodium (SENOKOT-S) 8.6-50 MG tablet; Take 1-2 tablets by mouth daily    Neuropathy  -     pregabalin (LYRICA) 75 MG capsule; Take 1 capsule by mouth nightly for 30 days. Max Daily Amount: 75 mg  -     pregabalin (LYRICA) 75 MG capsule; Take 1 capsule by mouth nightly for 90 days. Max Daily Amount: 75 mg    Gastroesophageal reflux disease, unspecified whether esophagitis present    Vitamin D deficiency    Constipation is still an issue and likely relates to abdominal pressure and bloating.  Add senna docusate 1-2 tabs daily.  Recall this is a chronic problem and has not been adequately treated.  Neuropathic pain in her legs and feet, worse at night.  Gabapentin helps some but not adequately.  She has no side effects from it.  Switch from gabapentin to pregabalin to see if it works better.  GERD worse lately, continue famotidine and Protonix.  Vitamin D deficiency on supplementation with normal levels.  Cough and chest pain sounds suspicious for aspiration and encouraged famotidine and Protonix.    Health Maintenance Due   Topic Date Due    Shingles vaccine (1 of 2) Never done    Respiratory Syncytial Virus (RSV) Pregnant or age 60 yrs+ (1 - 1-dose 75+ series) Never done    COVID-19 Vaccine (6 - 2023-24 season) 09/01/2024        Follow-up and Dispositions    Return in about 6 months (around 6/11/2025) for MEDICARE WELLNESS.            Reviewed plan of care. Patient has provided input and agrees with goals.     The nurse provided the patient and/or family with advanced directive information if needed and encouraged the patient to provide a copy to the office when available.

## (undated) DEVICE — Device

## (undated) DEVICE — SOLIDIFIER MEDC 1200ML -- CONVERT TO 356117

## (undated) DEVICE — SYR 10ML LUER LOK 1/5ML GRAD --

## (undated) DEVICE — CONTAINER SPEC 20 ML LID NEUT BUFF FORMALIN 10 % POLYPR STS

## (undated) DEVICE — Z DISCONTINUED PER MEDLINE LINE GAS SAMPLING O2/CO2 LNG AD 13 FT NSL W/ TBNG FILTERLINE

## (undated) DEVICE — TOWEL 4 PLY TISS 19X30 SUE WHT

## (undated) DEVICE — NEONATAL-ADULT SPO2 SENSOR: Brand: NELLCOR

## (undated) DEVICE — 1200 GUARD II KIT W/5MM TUBE W/O VAC TUBE: Brand: GUARDIAN

## (undated) DEVICE — BAG SPEC BIOHZRD 10 X 10 IN --

## (undated) DEVICE — NEEDLE HYPO 18GA L1.5IN PNK S STL HUB POLYPR SHLD REG BVL

## (undated) DEVICE — BASIN EMSIS 16OZ GRAPHITE PLAS KID SHP MOLD GRAD FOR ORAL

## (undated) DEVICE — FORCEPS BX L160CM DIA8MM GRSP DISECT CUP TIP NONLOCKING ROT

## (undated) DEVICE — BLOCK BITE ENDOSCP AD 21 MM W/ DIL BLU LF DISP

## (undated) DEVICE — SYR 3ML LL TIP 1/10ML GRAD --

## (undated) DEVICE — KENDALL RADIOLUCENT FOAM MONITORING ELECTRODE RECTANGULAR SHAPE: Brand: KENDALL

## (undated) DEVICE — SET ADMIN 16ML TBNG L100IN 2 Y INJ SITE IV PIGGY BK DISP